# Patient Record
Sex: MALE | Race: WHITE | NOT HISPANIC OR LATINO | Employment: FULL TIME | URBAN - METROPOLITAN AREA
[De-identification: names, ages, dates, MRNs, and addresses within clinical notes are randomized per-mention and may not be internally consistent; named-entity substitution may affect disease eponyms.]

---

## 2017-03-20 ENCOUNTER — MYC MEDICAL ADVICE (OUTPATIENT)
Dept: FAMILY MEDICINE | Facility: CLINIC | Age: 57
End: 2017-03-20

## 2017-03-20 DIAGNOSIS — I10 HYPERTENSION GOAL BP (BLOOD PRESSURE) < 140/90: Primary | ICD-10-CM

## 2017-03-21 RX ORDER — LISINOPRIL 10 MG/1
10 TABLET ORAL DAILY
Qty: 30 TABLET | Refills: 0 | Status: SHIPPED | OUTPATIENT
Start: 2017-03-21 | End: 2017-04-17 | Stop reason: SINTOL

## 2017-03-21 NOTE — TELEPHONE ENCOUNTER
MP  Last OV 12/6/16 and Dyazide d/c'd because of lightheadedness  Did you want to start HTN med and have him f/u with you later, or first OV?  BP Readings from Last 3 Encounters:   12/06/16 140/90   08/05/15 122/82   07/06/15 128/82     Please advise.  Bhumika Frey, RN

## 2017-04-13 DIAGNOSIS — I10 HYPERTENSION GOAL BP (BLOOD PRESSURE) < 140/90: ICD-10-CM

## 2017-04-13 RX ORDER — LISINOPRIL 10 MG/1
TABLET ORAL
Start: 2017-04-13

## 2017-04-13 NOTE — TELEPHONE ENCOUNTER
Too soon.  Given 1 month 3/21. Further refill will be addressed at 4/17 OV    Note from 3/21 refill encounter:  Elliot Burkett MD    8:13 AM   Ok-I sent a new rx for one called Lisinopril to your [facility]. I need to see you in one month, just before you run out of it, and bring your BP cuff with you. A nuisance cough is one side effect you might get but hopefully not        Lisinopril    Last Written Prescription Date: 3/21/2017  Last Fill Quantity: 30, # refills: 0  Last Office Visit with G, P or [facility] prescribing provider: 12/6/2016  Next 5 appointments (look out 90 days)     Apr 17, 2017  9:30 AM CDT   Office Visit with Elliot Burkett MD   [facility]    [address] [address]   [phone]                   Potassium   Date Value Ref Range Status   12/06/2016 4.0 3.4 - 5.3 mmol/L Final     Creatinine   Date Value Ref Range Status   12/06/2016 1.06 0.66 - 1.25 mg/dL Final     BP Readings from Last 3 Encounters:   12/06/16 140/90   08/05/15 122/82   07/06/15 128/82

## 2017-04-17 ENCOUNTER — OFFICE VISIT (OUTPATIENT)
Dept: FAMILY MEDICINE | Facility: CLINIC | Age: 57
End: 2017-04-17
Payer: COMMERCIAL

## 2017-04-17 VITALS
HEART RATE: 74 BPM | RESPIRATION RATE: 14 BRPM | OXYGEN SATURATION: 98 % | BODY MASS INDEX: 32.23 KG/M2 | TEMPERATURE: 98.5 F | HEIGHT: 72 IN | SYSTOLIC BLOOD PRESSURE: 134 MMHG | DIASTOLIC BLOOD PRESSURE: 91 MMHG | WEIGHT: 238 LBS

## 2017-04-17 DIAGNOSIS — I10 HYPERTENSION GOAL BP (BLOOD PRESSURE) < 140/90: Primary | ICD-10-CM

## 2017-04-17 PROCEDURE — 99213 OFFICE O/P EST LOW 20 MIN: CPT | Performed by: FAMILY MEDICINE

## 2017-04-17 NOTE — PROGRESS NOTES
Chief Complaint   Patient presents with     Hypertension     follow up med change to Lisinopril-getting dry, tickly cough-Pt brought home cuff for comparison-exercises regularly     Blood Draw     -pt fasting if needed-concerned about blood sugar     Subjective:see my chart note, and I did start him on lisinopril because he was getting high readings and he did get the chronic tickle cough and he gets a little bit of dizziness when he stands up too quickly like he did with Dyazide but he brought in his blood pressure cuff today and it is clearly reading 10 or 15 points to eye in both the systolic and the diastolic making me question whether the original data was correct.    Objective: We went to the history and the numbers. Our blood pressure reading here was great but he did it with his machine around the same time and it was much higher and I did it again and got 118/70 and his machine just a minute earlier had gotten  140/90 something.    Assessment and plan: At this point I don't think he has hypertension but it's hard to know because his machine is inaccurate. He can go to the fire station or try to get an accurate machine, go off the lisinopril entirely which we wouldn't restart any way because of the cough, and if over the next few weeks his numbers are definitely high and they are accurate and I would start him on losartan 50 mg

## 2017-04-17 NOTE — NURSING NOTE
Chief Complaint   Patient presents with     Hypertension     follow up med change to Lisinopril-getting dry, tickly cough-Pt brought home cuff for comparison       Initial BP (!) 134/91  Pulse 74  Temp 98.5  F (36.9  C) (Oral)  Resp 14  Ht 6' (1.829 m)  Wt 238 lb (108 kg)  SpO2 98%  BMI 32.28 kg/m2 Estimated body mass index is 32.28 kg/(m^2) as calculated from the following:    Height as of this encounter: 6' (1.829 m).    Weight as of this encounter: 238 lb (108 kg).  BP completed using cuff size: large    Health Maintenance that is potentially due pending provider review:  Health Maintenance Due   Topic Date Due     DEPRESSION ACTION PLAN Q1 YR (NO INBASKET)  07/15/2016         ordered

## 2017-04-17 NOTE — MR AVS SNAPSHOT
After Visit Summary   4/17/2017    Elliot Norris    MRN: [id number]           Patient Information     Date Of Birth          1960        Visit Information        Provider Department      4/17/2017 9:30 AM Elliot Burkett MD [facility]        Today's Diagnoses     Hypertension goal BP (blood pressure) < 140/90    -  1       Follow-ups after your visit        Who to contact     If you have questions or need follow up information about today's clinic visit or your schedule please contact [facility] directly at [phone].  Normal or non-critical lab and imaging results will be communicated to you by Grabbithart, letter or phone within 4 business days after the clinic has received the results. If you do not hear from us within 7 days, please contact the clinic through RiseHealtht or phone. If you have a critical or abnormal lab result, we will notify you by phone as soon as possible.  Submit refill requests through Finicity or call your pharmacy and they will forward the refill request to us. Please allow 3 business days for your refill to be completed.          Additional Information About Your Visit        MyChart Information     Finicity gives you secure access to your electronic health record. If you see a primary care provider, you can also send messages to your care team and make appointments. If you have questions, please call your primary care clinic.  If you do not have a primary care provider, please call [phone] and they will assist you.        Care EveryWhere ID     This is your Care EveryWhere ID. This could be used by other organizations to access your [place] medical records  [id number]        Your Vitals Were     Pulse Temperature Respirations Height Pulse Oximetry BMI (Body Mass Index)    74 98.5  F (36.9  C) (Oral) 14 6' (1.829 m) 98% 32.28 kg/m2       Blood Pressure from Last 3 Encounters:   04/17/17 (!) 134/91   12/06/16 140/90   08/05/15 122/82     Weight from Last 3 Encounters:   04/17/17 238 lb (108 kg)   12/06/16 241 lb (109.3 kg)   08/05/15 244 lb (110.7 kg)              We Performed the Following     DEPRESSION ACTION PLAN (DAP)          Today's Medication Changes          These changes are accurate as of: 4/17/17 10:10 AM.  If you have any questions, ask your nurse or doctor.               Stop taking these medicines if you haven't already. Please contact your care team if you have questions.     lisinopril 10 MG tablet   Commonly known as:  PRINIVIL/ZESTRIL   Stopped by:  Elliot Burkett MD                    Primary Care Provider Office Phone # Fax #    Elliot Burkett [phone]       [facility] [address] [address]        Thank you!     Thank you for choosing [facility]  for your care. Our goal is always to provide you with excellent care. Hearing back from our patients is one way we can continue to improve our services. Please take a few minutes to complete the written survey that you may receive in the mail after your visit with us. Thank you!             Your Updated Medication List - Protect others around you: Learn how to safely use, store and throw away your medicines at www.disposemymeds.org.          This list is accurate as of: 4/17/17 10:10 AM.  Always use your most recent med list.                   Brand Name Dispense Instructions for use    BENADRYL 25 MG tablet   Generic drug:  diphenhydrAMINE      Take 25 mg by mouth At Bedtime       simvastatin 20 MG tablet    ZOCOR    90 tablet    Take 1 tablet (20 mg) by mouth At Bedtime

## 2017-06-10 ENCOUNTER — MYC MEDICAL ADVICE (OUTPATIENT)
Dept: FAMILY MEDICINE | Facility: CLINIC | Age: 57
End: 2017-06-10

## 2017-06-12 NOTE — TELEPHONE ENCOUNTER
KB is listed as PCP but has not seen him in recent years and MP not in office anymore.  Advised apt here to establish care and for next step on skin issue.  Mely William RN

## 2017-06-19 ENCOUNTER — OFFICE VISIT (OUTPATIENT)
Dept: FAMILY MEDICINE | Facility: CLINIC | Age: 57
End: 2017-06-19
Payer: COMMERCIAL

## 2017-06-19 VITALS
HEART RATE: 77 BPM | SYSTOLIC BLOOD PRESSURE: 145 MMHG | HEIGHT: 72 IN | BODY MASS INDEX: 32.37 KG/M2 | OXYGEN SATURATION: 98 % | DIASTOLIC BLOOD PRESSURE: 85 MMHG | WEIGHT: 239 LBS | TEMPERATURE: 97.4 F

## 2017-06-19 DIAGNOSIS — L98.9 SKIN LESION: Primary | ICD-10-CM

## 2017-06-19 DIAGNOSIS — Z85.828 HISTORY OF BASAL CELL CARCINOMA: ICD-10-CM

## 2017-06-19 PROCEDURE — 99214 OFFICE O/P EST MOD 30 MIN: CPT | Performed by: FAMILY MEDICINE

## 2017-06-19 NOTE — NURSING NOTE
Chief Complaint   Patient presents with     Skin Check     /85  Pulse 77  Temp 97.4  F (36.3  C) (Oral)  Ht 6' (1.829 m)  Wt 239 lb (108.4 kg)  SpO2 98%  BMI 32.41 kg/m2 Estimated body mass index is 32.41 kg/(m^2) as calculated from the following:    Height as of this encounter: 6' (1.829 m).    Weight as of this encounter: 239 lb (108.4 kg).  BP completed using cuff size: large       Health Maintenance due pending provider review:  PHQ9    PHQ9-Gave pt questionnare    Kimberly Gonsalves MA

## 2017-06-19 NOTE — MR AVS SNAPSHOT
After Visit Summary   6/19/2017    Elliot Norris    MRN: [id number]           Patient Information     Date Of Birth          1960        Visit Information        Provider Department      6/19/2017 1:30 PM Paulino Roberto MD [facility]        Today's Diagnoses     History of skin cancer    -  1    Skin lesion           Follow-ups after your visit        Additional Services     DERMATOLOGY REFERRAL       Your provider has referred you to: [facility]: [facility] [phone]   [email or web address]  [facility]: [place] [facility] [place] [phone]   [email or web address]    Please be aware that coverage of these services is subject to the terms and limitations of your health insurance plan.  Call member services at your health plan with any benefit or coverage questions.      Please bring the following with you to your appointment:    (1) Any X-Rays, CTs or MRIs which have been performed.  Contact the facility where they were done to arrange for  prior to your scheduled appointment.    (2) List of current medications  (3) This referral request   (4) Any documents/labs given to you for this referral                  Who to contact     If you have questions or need follow up information about today's clinic visit or your schedule please contact [facility] directly at [phone].  Normal or non-critical lab and imaging results will be communicated to you by MyChart, letter or phone within 4 business days after the clinic has received the results. If you do not hear from us within 7 days, please contact the clinic through MyChart or phone. If you have a critical or abnormal lab result, we will notify you by phone as soon as possible.  Submit refill requests through [employer] or call your pharmacy and they will forward the refill request to us. Please allow  3 business days for your refill to be completed.          Additional Information About Your Visit        MyChart Information     Sabirmedical gives you secure access to your electronic health record. If you see a primary care provider, you can also send messages to your care team and make appointments. If you have questions, please call your primary care clinic.  If you do not have a primary care provider, please call [phone] and they will assist you.        Care EveryWhere ID     This is your Care EveryWhere ID. This could be used by other organizations to access your [place] medical records  [id number]        Your Vitals Were     Pulse Temperature Height Pulse Oximetry BMI (Body Mass Index)       77 97.4  F (36.3  C) (Oral) 6' (1.829 m) 98% 32.41 kg/m2        Blood Pressure from Last 3 Encounters:   06/19/17 145/85   04/17/17 (!) 134/91   12/06/16 140/90    Weight from Last 3 Encounters:   06/19/17 239 lb (108.4 kg)   04/17/17 238 lb (108 kg)   12/06/16 241 lb (109.3 kg)              We Performed the Following     DERMATOLOGY REFERRAL        Primary Care Provider Office Phone # Fax #    Paulino Adrián Roberto [phone]       [place] [id number]        Thank you!     Thank you for choosing [facility]  for your care. Our goal is always to provide you with excellent care. Hearing back from our patients is one way we can continue to improve our services. Please take a few minutes to complete the written survey that you may receive in the mail after your visit with us. Thank you!             Your Updated Medication List - Protect others around you: Learn how to safely use, store and throw away your medicines at www.disposemymeds.org.          This list is accurate as of: 6/19/17  1:45 PM.  Always use your most recent med list.                   Brand Name Dispense Instructions for use    BENADRYL 25 MG tablet   Generic drug:   diphenhydrAMINE      Take 25 mg by mouth At Bedtime       simvastatin 20 MG tablet    ZOCOR    90 tablet    Take 1 tablet (20 mg) by mouth At Bedtime

## 2017-06-19 NOTE — PROGRESS NOTES
SUBJECTIVE:                                                    Elliot Norris is a 57 year old male who presents to clinic today for the following health issues:    Recheck spot on Lt side of forehead.  Patient is previously had this spot frozen in the clinic and also tried freezing it himself.  He has a diagnosis of actinic keratoses on the forehead but also a history of Mohs surgery for a basal cell carcinoma on his nose.  He somewhat worried that this lesion on his head may be something worse, it is certainly not healing      ROS: As per HPI.  Skin: Actinic keratoses but no other rashes  Lymph: no swollen nodes    Allergies, reviewed:   No Known Allergies   Med list prior to vist:    Current Outpatient Prescriptions on File Prior to Visit:  simvastatin (ZOCOR) 20 MG tablet Take 1 tablet (20 mg) by mouth At Bedtime   diphenhydrAMINE (BENADRYL) 25 MG tablet Take 25 mg by mouth At Bedtime      No current facility-administered medications on file prior to visit.   Medications reviewed and updated    Objective:  /85  Pulse 77  Temp 97.4  F (36.3  C) (Oral)  Ht 6' (1.829 m)  Wt 239 lb (108.4 kg)  SpO2 98%  BMI 32.41 kg/m2  General Appearance: Pleasant, alert, WN/WD in no acute respiratory distress.  Eye Exam: Normal external eye, conjunctiva, lids.  OroPharynx Exam: Dental hygiene adequate. Normal buccal mucosa. Normal pharynx.  Neck Exam: Supple, no masses or enlarged, tender nodes.  Cardiovascular Exam: No edema or vascular insufficiency.  Skin: 5 mm nonhealing ulcer on the left upper forehead  Neurologic Exam: Nonfocal, no tremor. Normal gait.  Psychiatric Exam: Alert - appropriate, normal affect    ASSESSMENT/PLAN:    ICD-10-CM    1. Skin lesion L98.9 DERMATOLOGY REFERRAL   2. History of basal cell carcinoma Z85.828       Skin lesion concerning for progression of actinic keratosis to squamous cell carcinoma, but patient also had a history of basal cell carcinoma  Previous attempts at treating  this lesion with cryotherapy were not successful patient needs dermatology follow-up and treatment  Referred today    Paulino Roberto MD MPH

## 2017-06-20 ASSESSMENT — PATIENT HEALTH QUESTIONNAIRE - PHQ9: SUM OF ALL RESPONSES TO PHQ QUESTIONS 1-9: 2

## 2017-07-17 ENCOUNTER — OFFICE VISIT (OUTPATIENT)
Dept: FAMILY MEDICINE | Facility: CLINIC | Age: 57
End: 2017-07-17
Payer: COMMERCIAL

## 2017-07-17 DIAGNOSIS — D48.5 NEOPLASM OF UNCERTAIN BEHAVIOR OF SKIN: Primary | ICD-10-CM

## 2017-07-17 DIAGNOSIS — Z85.828 HISTORY OF BASAL CELL CARCINOMA: ICD-10-CM

## 2017-07-17 PROCEDURE — 11310 SHAVE SKIN LESION 0.5 CM/<: CPT | Performed by: FAMILY MEDICINE

## 2017-07-17 PROCEDURE — 88305 TISSUE EXAM BY PATHOLOGIST: CPT | Mod: 90 | Performed by: FAMILY MEDICINE

## 2017-07-17 PROCEDURE — 99000 SPECIMEN HANDLING OFFICE-LAB: CPT | Performed by: FAMILY MEDICINE

## 2017-07-17 PROCEDURE — 99212 OFFICE O/P EST SF 10 MIN: CPT | Mod: 25 | Performed by: FAMILY MEDICINE

## 2017-07-17 NOTE — MR AVS SNAPSHOT
"              After Visit Summary   7/17/2017    Elliot Norris    MRN: [id number]           Patient Information     Date Of Birth          1960        Visit Information        Provider Department      7/17/2017 8:20 AM Parisa Madrigal MD [facility] Primary [facility] Skin        Today's Diagnoses     Neoplasm of uncertain behavior of skin    -  1      Care Instructions    WOUND CARE INSTRUCTIONS  1. After 24 hours, change dressing daily.  2. Wash hands before every dressing change.  3. Wash the wound area with a mild soap, then rinse  4. Gently pat dry with a sterile gauze or Q-tip.  5. Apply Vaseline or Aquaphor only over entire wound. Do NOT use Neosporin - as many people react to neomycin.  6. Finally, cover with a bandage or sterile non-stick gauze with micropore paper tape.  7. Repeat once daily until wound has healed.    Do not let the wound dry out.  The wound will heal faster and with better cosmetic results if routinely kept moist with step #5. It is a false belief that a wound heals better when it is exposed to air and allowed to dry out.      Soap, water and shampoo will not hurt this area.    Do not go swimming or take baths, but showering is encouraged.    Limit use of the area where the procedure was done for a few days to allow for optimal healing.    If you experience bleeding:  Repeat steps #2-5 and hold firm pressure on the area for 10 minutes without checking to see if the bleeding has stopped. \"Checking\" pulls off the protective wound clot and restarts the bleeding all over again. Re-apply pressure for 10 minutes if necessary to stop bleeding.  Use additional sterile gauze and tape to maintain pressure once bleeding has stopped.    Signs of Infection:  Infection can occur in any area where skin has been disrupted.  If you notice persistent redness, swelling, colored drainage, increasing pain, fever or other signs of infection, please call us at: [phone] and ask " to have me or my colleague paged. We will call you back to discuss.    Pathology Results:  You will be notified, generally via letter or MyChart, in approximately 10 days. If there is anything we need to discuss or further treatment needed, I will call you to discuss it.        PATIENT INFORMATION : WOUNDS  During the healing process you will notice a number of changes. All wounds develop a small halo of redness surrounding the wound.  This means healing is occurring. Severe itching with extensive redness usually indicates sensitivity to the ointment or bandage tape used to dress the wound.  You should call our office if this develops.      Swelling  and/or discoloration around your surgical site is common, particularly when performed around the eye.    All wounds normally drain.  The larger the wound the more drainage there will be.  After 7-10 days, you will notice the wound beginning to shrink and new skin will begin to grow.  The wound is healed when you can see skin has formed over the entire area.  A healed wound has a healthy, shiny look to the surface and is red to dark pink in color to normalize.  Wounds may take approximately 4-6 weeks to heal.  Larger wounds may take 6-8 weeks. After the wound is healed you may discontinue dressing changes.    You may experience a sensation of tightness as your wound heals. This is normal and will gradually subside.    Your healed wound may be sensitive to temperature changes. This sensitivity improves with time, but if you re having a lot of discomfort, try to avoid temperature extremes.    Patients frequently experience itching after their wound appears to have healed because of the continue healing under the skin.  Plain Vaseline will help relieve the itching.                Follow-ups after your visit        Who to contact     If you have questions or need follow up information about today's clinic visit or your schedule please contact [facility]  SK[facility] directly at [phone].  Normal or non-critical lab and imaging results will be communicated to you by MyChart, letter or phone within 4 business days after the clinic has received the results. If you do not hear from us within 7 days, please contact the clinic through [email or web address] or phone. If you have a critical or abnormal lab result, we will notify you by phone as soon as possible.  Submit refill requests through Sidestage or call your pharmacy and they will forward the refill request to us. Please allow 3 business days for your refill to be completed.          Additional Information About Your Visit        [email or web address] Information     Sidestage gives you secure access to your electronic health record. If you see a primary care provider, you can also send messages to your care team and make appointments. If you have questions, please call your primary care clinic.  If you do not have a primary care provider, please call [phone] and they will assist you.        Care EveryWhere ID     This is your Care EveryWhere ID. This could be used by other organizations to access your [place] medical records  [id number]         Blood Pressure from Last 3 Encounters:   06/19/17 145/85   04/17/17 (!) 134/91   12/06/16 140/90    Weight from Last 3 Encounters:   06/19/17 108.4 kg (239 lb)   04/17/17 108 kg (238 lb)   12/06/16 109.3 kg (241 lb)              We Performed the Following     CL SURG PATH [facility] DERM     SHAV SKIN LESION FACE/EARS <=0.5 CM        Primary Care Provider Office Phone # Fax #    Paulino Adrián Roberto [phone]       [facility] [address] [address]        Equal Access to Services     [facility] Medical CenterTHONY : Hadii aad ku hadasho Soomaali, waaxda luqadaha, qaybta kaalmada adeegyada, werner huynh. So [facility] [phone].    ATENCIÓN: Si habla español, tiene a pinon disposición servicios gratuitos de asistencia lingüística. Llame al  [phone].    We comply with applicable federal civil rights laws and [place] laws. We do not discriminate on the basis of race, color, national origin, age, disability sex, sexual orientation or gender identity.            Thank you!     Thank you for choosing [facility] - PR[facility] SK[facility]  for your care. Our goal is always to provide you with excellent care. Hearing back from our patients is one way we can continue to improve our services. Please take a few minutes to complete the written survey that you may receive in the mail after your visit with us. Thank you!             Your Updated Medication List - Protect others around you: Learn how to safely use, store and throw away your medicines at www.disposemymeds.org.          This list is accurate as of: 7/17/17  8:28 AM.  Always use your most recent med list.                   Brand Name Dispense Instructions for use Diagnosis    BENADRYL 25 MG tablet   Generic drug:  diphenhydrAMINE      Take 25 mg by mouth At Bedtime        simvastatin 20 MG tablet    ZOCOR    90 tablet    Take 1 tablet (20 mg) by mouth At Bedtime    Hyperlipidemia LDL goal <160

## 2017-07-17 NOTE — PATIENT INSTRUCTIONS
"WOUND CARE INSTRUCTIONS  1. After 24 hours, change dressing daily.  2. Wash hands before every dressing change.  3. Wash the wound area with a mild soap, then rinse  4. Gently pat dry with a sterile gauze or Q-tip.  5. Apply Vaseline or Aquaphor only over entire wound. Do NOT use Neosporin - as many people react to neomycin.  6. Finally, cover with a bandage or sterile non-stick gauze with micropore paper tape.  7. Repeat once daily until wound has healed.    Do not let the wound dry out.  The wound will heal faster and with better cosmetic results if routinely kept moist with step #5. It is a false belief that a wound heals better when it is exposed to air and allowed to dry out.      Soap, water and shampoo will not hurt this area.    Do not go swimming or take baths, but showering is encouraged.    Limit use of the area where the procedure was done for a few days to allow for optimal healing.    If you experience bleeding:  Repeat steps #2-5 and hold firm pressure on the area for 10 minutes without checking to see if the bleeding has stopped. \"Checking\" pulls off the protective wound clot and restarts the bleeding all over again. Re-apply pressure for 10 minutes if necessary to stop bleeding.  Use additional sterile gauze and tape to maintain pressure once bleeding has stopped.    Signs of Infection:  Infection can occur in any area where skin has been disrupted.  If you notice persistent redness, swelling, colored drainage, increasing pain, fever or other signs of infection, please call us at: [phone] and ask to have me or my colleague paged. We will call you back to discuss.    Pathology Results:  You will be notified, generally via letter or MyChart, in approximately 10 days. If there is anything we need to discuss or further treatment needed, I will call you to discuss it.        PATIENT INFORMATION : WOUNDS  During the healing process you will notice a number of changes. All wounds develop a small " halo of redness surrounding the wound.  This means healing is occurring. Severe itching with extensive redness usually indicates sensitivity to the ointment or bandage tape used to dress the wound.  You should call our office if this develops.      Swelling  and/or discoloration around your surgical site is common, particularly when performed around the eye.    All wounds normally drain.  The larger the wound the more drainage there will be.  After 7-10 days, you will notice the wound beginning to shrink and new skin will begin to grow.  The wound is healed when you can see skin has formed over the entire area.  A healed wound has a healthy, shiny look to the surface and is red to dark pink in color to normalize.  Wounds may take approximately 4-6 weeks to heal.  Larger wounds may take 6-8 weeks. After the wound is healed you may discontinue dressing changes.    You may experience a sensation of tightness as your wound heals. This is normal and will gradually subside.    Your healed wound may be sensitive to temperature changes. This sensitivity improves with time, but if you re having a lot of discomfort, try to avoid temperature extremes.    Patients frequently experience itching after their wound appears to have healed because of the continue healing under the skin.  Plain Vaseline will help relieve the itching.

## 2017-07-17 NOTE — PROGRESS NOTES
[facility] - PRIMARY CARE SKIN    CC : Lesion(s)  SUBJECTIVE:                                                    Elliot Norris is a 57 year old male who presents to clinic today because of a lesion on his scalp that he noticed years ago and has had cryotherapy in the past.     Bothersome lesions noticed by the patient or other skin concerns :  Issue    Lesion on nose   Onset : Months.  Enlarging : YES.  Bleeding : NO  Itchy or irritating : YES.  Pain or tenderness : NO.  Changing color : NO.    Personal history of skin cancer : YES - basal cell carcinoma.  Family history of skin cancer : Yes- Father     Sun Exposure History  Previous history of significant sun exposure:  Blistering sunburns : NO  Tanning beds : NO.  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    Refer to electronic medical record (EMR) for past medical history and medications.      ROS : 14 point review of systems was negative except the symptoms listed above in the HPI.        This document serves as a record of the services and decisions personally performed and made by Julia Madrigal MD. It was created on her behalf by Vicente Polanco, a trained medical scribe. The creation of this document is based on the scribe's personal observations and the provider's statements to the medical scribe.  Vicente Polanco July 17, 2017 6:42 AM      OBJECTIVE:                                                    GENERAL: healthy, alert and no distress  SKIN: Interiano Skin Type - I.  Scalp, Face, Neck, Arms and Hands were examined. The dermatoscope was used to help evaluate pigmented lesions.  Skin Pertinent Findings:  Scalp left frontal anterior scalp 5 mm escarp overline base of erythema and induration. ? BCC ? Actinic keratosis     Diagnostic Test Results:  No results found for this or any previous visit (from the past 24  "hour(s)).          ASSESSMENT:                                                      Encounter Diagnoses   Name Primary?     Neoplasm of uncertain behavior of skin Yes     History of basal cell carcinoma            PLAN:                                                    Patient Instructions   WOUND CARE INSTRUCTIONS  1. After 24 hours, change dressing daily.  2. Wash hands before every dressing change.  3. Wash the wound area with a mild soap, then rinse  4. Gently pat dry with a sterile gauze or Q-tip.  5. Apply Vaseline or Aquaphor only over entire wound. Do NOT use Neosporin - as many people react to neomycin.  6. Finally, cover with a bandage or sterile non-stick gauze with micropore paper tape.  7. Repeat once daily until wound has healed.    Do not let the wound dry out.  The wound will heal faster and with better cosmetic results if routinely kept moist with step #5. It is a false belief that a wound heals better when it is exposed to air and allowed to dry out.      Soap, water and shampoo will not hurt this area.    Do not go swimming or take baths, but showering is encouraged.    Limit use of the area where the procedure was done for a few days to allow for optimal healing.    If you experience bleeding:  Repeat steps #2-5 and hold firm pressure on the area for 10 minutes without checking to see if the bleeding has stopped. \"Checking\" pulls off the protective wound clot and restarts the bleeding all over again. Re-apply pressure for 10 minutes if necessary to stop bleeding.  Use additional sterile gauze and tape to maintain pressure once bleeding has stopped.    Signs of Infection:  Infection can occur in any area where skin has been disrupted.  If you notice persistent redness, swelling, colored drainage, increasing pain, fever or other signs of infection, please call us at: [phone] and ask to have me or my colleague paged. We will call you back to discuss.    Pathology Results:  You will be " notified, generally via letter or MyChart, in approximately 10 days. If there is anything we need to discuss or further treatment needed, I will call you to discuss it.        PATIENT INFORMATION : WOUNDS  During the healing process you will notice a number of changes. All wounds develop a small halo of redness surrounding the wound.  This means healing is occurring. Severe itching with extensive redness usually indicates sensitivity to the ointment or bandage tape used to dress the wound.  You should call our office if this develops.      Swelling  and/or discoloration around your surgical site is common, particularly when performed around the eye.    All wounds normally drain.  The larger the wound the more drainage there will be.  After 7-10 days, you will notice the wound beginning to shrink and new skin will begin to grow.  The wound is healed when you can see skin has formed over the entire area.  A healed wound has a healthy, shiny look to the surface and is red to dark pink in color to normalize.  Wounds may take approximately 4-6 weeks to heal.  Larger wounds may take 6-8 weeks. After the wound is healed you may discontinue dressing changes.    You may experience a sensation of tightness as your wound heals. This is normal and will gradually subside.    Your healed wound may be sensitive to temperature changes. This sensitivity improves with time, but if you re having a lot of discomfort, try to avoid temperature extremes.    Patients frequently experience itching after their wound appears to have healed because of the continue healing under the skin.  Plain Vaseline will help relieve the itching.                PROCEDURES:                                                    None.    Name : Shave Excision  Indication : Excision of tissue for pathology evaluation.  Location(s) : Left frontal scalp  Completed by : Julia Madrigal MD  Photo Taken : Yes  Anesthesia : Patient was anesthetized by infiltrating the  area surrounding the lesion with 1% lidocaine.   epinephrine [id number] : Yes.  Buffered with bicarbonate : Yes.  Note : Discussed the risk of pain, infection, scarring, hypo- or hyperpigmentation and recurrence or need for re-treatment. The benefits of treatment and alternative treatments were also discussed.    During this procedure, the universal protocol was utilized. The patient's identity was confirmed by no less than two patient identifiers, correct procedure was verified, correct site was verified and marked as applicable and a final pause was completed.    Sterile technique was used throughout the procedure. The skin was cleaned and prepped with surgical cleanser. Once adequate anesthesia was obtained, the lesion was removed with a deep scallop shave procedure. The specimen was sent to pathology.    Direct pressure, monopolar cautery, and monsels's was applied for hemostasis. No bleeding was present upon the completion of the procedure. The wound was coated with antibacterial ointment. A dry sterile dressing was applied. Patient tolerated the procedure well and left in satisfactory condition.    Primary provider and referring provider will be informed regarding the tissue report when it returns.    TT : 20 minutes.  CT : 15 minutes.      The information in this document, created by the medical scribe for me, accurately reflects the services I personally performed and the decisions made by me. I have reviewed and approved this document for accuracy prior to leaving the patient care area.  Julia Madrigal MD July 17, 2017 6:42 AM  [facility] - PRIMARY CARE SKIN

## 2017-07-26 ENCOUNTER — MYC MEDICAL ADVICE (OUTPATIENT)
Dept: FAMILY MEDICINE | Facility: CLINIC | Age: 57
End: 2017-07-26

## 2017-07-28 ENCOUNTER — TELEPHONE (OUTPATIENT)
Dept: FAMILY MEDICINE | Facility: CLINIC | Age: 57
End: 2017-07-28

## 2017-07-28 NOTE — TELEPHONE ENCOUNTER
Encounter : phonecall  Discussed lab results :       Pathology report : nodular basal cell carcinoma on the left frontal anterior scalp      Recommendations :  Mohs surgery

## 2017-08-01 ENCOUNTER — TELEPHONE (OUTPATIENT)
Dept: DERMATOLOGY | Facility: CLINIC | Age: 57
End: 2017-08-01

## 2017-08-01 NOTE — LETTER
[facility]  [address]  [address]  [phone]    8/1/2017     Elliot Norris  [address]  [facility] [address]      Dear Elliot:    You are scheduled for Mohs Surgery on: September 14 th @ 7 15 am.    Please check in at [facility], [address].     You don't need to arrive more than 5-10 minutes prior to your appointment time.     Be sure to eat a good breakfast and bathe and wash your hair prior to surgery. Please bring  with you.    If you are taking any anti-coagulants that are prescribed by your Doctor (such as Coumadin/Warfarin, Plavix, Aspirin, Ibuprofen), please continue taking them.     However, if you are taking anti-coagulants over the counter without a Doctor's order for a medical condition, please discontinue them 10 days prior to surgery.     Please wear loose comfortable clothing as it could possibly be 4-6 hours until your surgery is completed depending upon how many layers of tissue need to be removed.      Thank you,    TREY Huizar MD

## 2017-08-01 NOTE — TELEPHONE ENCOUNTER
Pt called back. Letter and info mailed to pt. Pt educated on procedure. Patient requested codes for MOHS procedure so he can ask him ins how much it will cost him.

## 2017-08-01 NOTE — TELEPHONE ENCOUNTER
----- Message from Elliot Huizar MD sent at 7/31/2017  7:16 AM CDT -----  Regarding: RE: mohs  Please call to schedule     (thanks luz maria)      ----- Message -----     From: Parisa Madrigal MD     Sent: 7/28/2017   4:12 PM       To: Elliot Huizar MD  Subject: mohs                                             Sharla Sue,        57 year old male with new basal cell carcinoma on the left frontal scalp. History of previous mohs on the nose. Your office can call him     Thank you,    luz maria

## 2017-08-01 NOTE — TELEPHONE ENCOUNTER
04005 first stage    If it goes more than one stage [address]8401      If it is not deep on scalp we can let it heal     If he wants stitches [address]

## 2017-09-14 ENCOUNTER — OFFICE VISIT (OUTPATIENT)
Dept: DERMATOLOGY | Facility: CLINIC | Age: 57
End: 2017-09-14
Payer: COMMERCIAL

## 2017-09-14 VITALS — HEART RATE: 77 BPM | DIASTOLIC BLOOD PRESSURE: 90 MMHG | SYSTOLIC BLOOD PRESSURE: 133 MMHG | HEIGHT: 72 IN

## 2017-09-14 DIAGNOSIS — L81.4 LENTIGO: ICD-10-CM

## 2017-09-14 DIAGNOSIS — C44.619 BASAL CELL CARCINOMA OF LEFT FOREARM: ICD-10-CM

## 2017-09-14 DIAGNOSIS — D23.9 DERMAL NEVUS: ICD-10-CM

## 2017-09-14 DIAGNOSIS — C44.319 BASAL CELL CARCINOMA, FOREHEAD: Primary | ICD-10-CM

## 2017-09-14 DIAGNOSIS — L82.1 SK (SEBORRHEIC KERATOSIS): ICD-10-CM

## 2017-09-14 PROCEDURE — 17312 MOHS ADDL STAGE: CPT | Performed by: DERMATOLOGY

## 2017-09-14 PROCEDURE — 11100: CPT | Mod: 59 | Performed by: DERMATOLOGY

## 2017-09-14 PROCEDURE — 17313 MOHS 1 STAGE T/A/L: CPT | Mod: 51 | Performed by: DERMATOLOGY

## 2017-09-14 PROCEDURE — 17311 MOHS 1 STAGE H/N/HF/G: CPT | Performed by: DERMATOLOGY

## 2017-09-14 PROCEDURE — 13132 CMPLX RPR F/C/C/M/N/AX/G/H/F: CPT | Mod: 51 | Performed by: DERMATOLOGY

## 2017-09-14 PROCEDURE — 99243 OFF/OP CNSLTJ NEW/EST LOW 30: CPT | Mod: 25 | Performed by: DERMATOLOGY

## 2017-09-14 PROCEDURE — 88331 PATH CONSLTJ SURG 1 BLK 1SPC: CPT | Mod: 59 | Performed by: DERMATOLOGY

## 2017-09-14 NOTE — NURSING NOTE
Initial /90  Pulse 77  Ht 1.829 m (6') Estimated body mass index is 32.41 kg/(m^2) as calculated from the following:    Height as of 6/19/17: 1.829 m (6').    Weight as of 6/19/17: 108.4 kg (239 lb). .

## 2017-09-14 NOTE — PATIENT INSTRUCTIONS
Sutured Wound Care     [facility]: [phone]    FOREHEAD          ? No strenuous activity for 48 hours. Resume moderate activity in 48 hours. No heavy exercising until you are seen for follow up in one week.     ? Take Tylenol as needed for discomfort.                         ? Do not drink alcoholic beverages for 48 hours.     ? Keep the pressure bandage in place for 24 hours. If the bandage becomes blood tinged or loose, reinforce it with gauze and tape.        (Refer to the reverse side of this page for management of bleeding).    ? Remove pressure bandage in 24 hours     ? Leave the flat bandage in place until your follow up appointment.    ? Keep the bandage dry. Wash around it carefully.    ? If the tape becomes soiled or starts to come off, reinforce it with additional paper tape.    ? Do not smoke for 3 weeks; smoking is detrimental to wound healing.    ? It is normal to have swelling and bruising around the surgical site. The bruising will fade in approximately 10-14 days. Elevate the area to reduce swelling.    ? Numbness, itchiness and sensitivity to temperature changes can occur after surgery and may take up to 18 months to normalize.      POSSIBLE COMPLICATIONS    BLEEDIN. Leave the bandage in place.  2. Use tightly rolled up gauze or a cloth to apply direct pressure over the bandage for 20   minutes.  3. Reapply pressure for an additional 20 minutes if necessary  4. Call the office or go to the nearest emergency room if pressure fails to stop the bleeding.  5. Use additional gauze and tape to maintain pressure once the bleeding has stopped.        PAIN:    1. Post operative pain should slowly get better, never worse.  2. A severe increase in pain may indicate a problem. Call the office if this occurs.    In case of emergency phone:Dr Huizar [phone]      Open Wound Care     for LEFT ARM        ? No strenuous activity for 48 hours    ? Take Tylenol as needed for discomfort.                                                 .         ? Do not drink alcoholic beverages for 48 hours.    ? Keep the pressure bandage in place for 24 hours. If the bandage becomes blood tinged or loose, reinforce it with gauze and tape.        (Refer to the reverse side of this page for management of bleeding).    ? Remove bandage in 24 hours and begin wound care as follows:     1. Clean area with tap water using a Q tip or gauze pad, (shower / bathe normally)  2. Dry wound with Q tip or gauze pad  3. Apply Aquaphor, Vaseline, Polysporin or Bacitracin Ointment with a Q tip    Do NOT use Neosporin Ointment *  4. Cover the wound with a band-aid or nonstick gauze pad and paper tape.  5. Repeat wound care once a day until wound is completely healed.    It is an old wives tale that a wound heals better when it is exposed to air and allowed to dry out. The wound will heal faster with a better cosmetic result if it is kept moist with ointment and covered with a bandage.  Do not let the wound dry out.      Supplies Needed:                Qtips or gauze pads                Polysporin or Bacitracin Ointment                Bandaids or nonstick gauze pads and paper tape    Wound care kits and brown paper tape are available for purchase at   the pharmacy.    BLEEDIN. Use tightly rolled up gauze or cloth to apply direct pressure over the bandage for 20   minutes.  2. Reapply pressure for an additional 20 minutes if necessary  3. Call the office or go to the nearest emergency room if pressure fails to stop the bleeding.  4. Use additional gauze and tape to maintain pressure once the bleeding has stopped.  5. Begin wound care 24 hours after surgery as directed.                  WOUND HEALING    1. One week after surgery a pink / red halo will form around the outside of the wound.   This is new skin.  2. The center of the wound will appear yellowish white and produce some drainage.  3. The pink halo will slowly migrate in  toward the center of the wound until the wound is covered with new shiny pink skin.  4. There will be no more drainage when the wound is completely healed.  5. It will take six months to one year for the redness to fade.  6. The scar may be itchy, tight and sensitive to extreme temperatures for a year after the surgery.  7. Massaging the area several times a day for several minutes after the wound is completely healed will help the scar soften and normalize faster. Begin massage only after healing is complete.      In case of emergency call: Dr Huizar: [phone]     [facility]: [phone]

## 2017-09-14 NOTE — NURSING NOTE
Surgical Office Location:  [facility]  [address] [place] [id number]

## 2017-09-14 NOTE — PROGRESS NOTES
Elliot Norris , a 57 year old year old male patient, I was asked to see by Dr. Madrigal for basal cell carcinoma on forehead.  Today he notes a hx of basal cell carcinoma o nnose.  He notes a spot on left forearm and chin.  Patient states this has been present for a while.  Location chin and forearm..  Patient reports the following symptoms:  none .  Patient reports the following previous treatments none.  Patient reports the following modifying factors none.  Associated symptoms: none.  He also notes brown spots on chest.  .  Patient has no other skin complaints today.  Remainder of the HPI, Meds, PMH, Allergies, FH, and SH was reviewed in chart.    Pertinent Hx:   Basal cell carcinoma   No past medical history on file.    Past Surgical History:   Procedure Laterality Date     MOHS MICROGRAPHIC PROCEDURE          Family History   Problem Relation Age of Onset     DIABETES Mother      Arthritis Mother      Gynecology Mother      DIABETES Father      Hypertension Father      Alcohol/Drug Father      Circulatory Father      Depression Father      HEART DISEASE Father      Lipids Father      CEREBROVASCULAR DISEASE Father       age 78     DIABETES Maternal Grandmother      CEREBROVASCULAR DISEASE Maternal Grandfather      Prostate Cancer Maternal Grandfather      Prostate Cancer Paternal Grandfather      Depression Paternal Grandmother      Depression Sister        Social History     Social History     Marital status: Life Partner     Spouse name: N/A     Number of children: N/A     Years of education: N/A     Occupational History     Not on file.     Social History Main Topics     Smoking status: Never Smoker     Smokeless tobacco: Never Used     Alcohol use Yes      Comment: 1 drink per week     Drug use: No     Sexual activity: Yes     Partners: Male     Other Topics Concern     Parent/Sibling W/ Cabg, Mi Or Angioplasty Before 65f 55m? No     Social History Narrative       Outpatient Encounter  Prescriptions as of 9/14/2017   Medication Sig Dispense Refill     simvastatin (ZOCOR) 20 MG tablet Take 1 tablet (20 mg) by mouth At Bedtime 90 tablet 3     diphenhydrAMINE (BENADRYL) 25 MG tablet Take 25 mg by mouth At Bedtime        No facility-administered encounter medications on file as of 9/14/2017.              Review Of Systems  Skin: As above  Eyes: negative  Ears/Nose/Throat: negative  Respiratory: No shortness of breath, dyspnea on exertion, cough, or hemoptysis  Cardiovascular: negative  Gastrointestinal: negative  Genitourinary: negative  Musculoskeletal: negative  Neurologic: negative  Psychiatric: negative  Hematologic/Lymphatic/Immunologic: negative  Endocrine: negative      O:   NAD, WDWN, Alert & Oriented, Mood & Affect wnl, Vitals stable   Here today alone   /90  Pulse 77  Ht 1.829 m (6')   General appearance wilver ii   Vitals stable   Alert, oriented and in no acute distress      Following lymph nodes palpated: Occipital, Cervical, Supraclavicular no lad   Stuck on papules and brown macules on trunk and ext    L forearm 1.3cm red scaly papule    Chin flesh colored papule   L forehead 9mm pink pearly papule         The remainder of expanded problem focused exam was unremarkable; the following areas were examined:  scalp/hair, conjunctiva/lids, face, neck, lips, chest, digits/nails, RUE, LUE.      Eyes: Conjunctivae/lids:Normal     ENT: Lips, buccal mucosa, tongue: normal    MSK:Normal    Cardiovascular: peripheral edema none    Pulm: Breathing Normal    Lymph Nodes: No Head and Neck Lymphadenopathy     Neuro/Psych: Orientation:Normal; Mood/Affect:Normal      MICRO:   L forearm:Orthokeratosis of epidermis with a proliferation of nests of basaloid cells, with peripheral palisading and a haphazard arrangement in the center extending into the dermis, forming nodules.  The tumor cells have hyperchromatic nuclei. Poor cytoplasm and intercellular bridging.    A/P:  1. L forearm r./o basal cell  carcinoma   TANGENTIAL BIOPSY IN HOUSE:  After consent, anesthesia with LEC and prep, tangential excision performed and dx above confirmed with frozen section histology.  No complications and routine wound care.  Patient told result basal cell carcinoma .    2. Seborrheic keratosis, lentigo, dermal nevus  3. Hx of basal cell carcinoma     BENIGN LESIONS DISCUSSED WITH PATIENT:  I discussed the specifics of tumor, prognosis, and genetics of benign lesions.  I explained that treatment of these lesions would be purely cosmetic and not medically neccessary.  I discussed with patient different removal options including excision, cautery and /or laser.      Nature and genetics of benign skin lesions dicussed with patient.  Signs and Symptoms of skin cancer discussed with patient.  Patient encouraged to perform monthly skin exams.  UV precautions reviewed with patient.  Skin care regimen reviewed with patient: Eliminate harsh soaps, i.e. Dial, zest, irsih spring; Mild soaps such as Cetaphil or Dove sensitive skin, avoid hot or cold showers, aggressive use of emollients including vanicream, cetaphil or cerave discussed with patient.    Risks of non-melanoma skin cancer discussed with patient   Return to clinic 6 months      PROCEDURE NOTE  L forehead basal cell carcinoma   MOHS:   Location    After PGACAC discussed with patient, decision for Mohs surgery was made. Indication for Mohs was Location. Patient confirmed the site with Dr. Huizar.  After anesthesia with LEC, the tumor was excised using standard Mohs technique in 2 stages(s).  CLEAR MARGINS OBTAINED and Final defect size was 1.6 cm.       REPAIR COMPLEX: Because of the tightness of the surrounding skin and Because of the size and full thickness nature of the defect, a complex closure was planned. After LEC anesthesia and prep, Burow's triangles were excised in the relaxed skin tension lines. The wound edges were widely undermined by dissection in the subcutaneous  plane until adequate tissue mobility was obtained. Hemostasis was obtained. The wound edges were closed in a layered fashion using Vicryl and Fast Absorbing Plain Gut sutures. Postoperative length was 5.3 cm.   EBL minimal; complications none; wound care routine.  The patient was discharged in good condition and will return in one week for wound evaluation.      L forearm basal cell carcinoma   MOHS:   Size    After PGACAC discussed with patient, decision for Mohs surgery was made. Indication for Mohs was Size. Patient confirmed the site with Dr. Huizar.  After anesthesia with LEC, the tumor was excised using standard Mohs technique in 1 stages(s).  CLEAR MARGINS OBTAINED and Final defect size was 1.7 cm.       REPAIR SECOND INTENT: We discussed the options for wound management in full with the patient including risks/benefits/possible outcomes. Decision made to allow the wound to heal by second intention. EBL minimal; complications none; wound care routine.  The patient was discharged in good condition and will return in one month or prn for wound evaluation.

## 2017-09-21 ENCOUNTER — ALLIED HEALTH/NURSE VISIT (OUTPATIENT)
Dept: DERMATOLOGY | Facility: CLINIC | Age: 57
End: 2017-09-21
Payer: COMMERCIAL

## 2017-09-21 DIAGNOSIS — Z48.01 ENCOUNTER FOR CHANGE OR REMOVAL OF SURGICAL WOUND DRESSING: Primary | ICD-10-CM

## 2017-09-21 PROCEDURE — 99207 ZZC NO CHARGE NURSE ONLY: CPT

## 2017-09-21 NOTE — PATIENT INSTRUCTIONS
WOUND CARE INSTRUCTIONS  for  ONE WEEK AFTER SURGERY          1) Leave flat bandage on your skin for one week after today s bandage change.  2) In one week when you remove the bandage, you may resume your regular skin care routine, including washing with mild soap and water, applying moisturizer, make-up and sunscreen.    3) If there are any open or bleeding areas at the incision/graft site you should begin to cover the area with a bandage daily as follows:    1) Clean and dry the area with plain tap water using a Q-tip or sterile gauze pad.  2) Apply Polysporin or Bacitracin ointment to the open area.  3) Cover the wound with a band-aid or a sterile non-stick gauze pad and micropore paper tape.             *Once the bandages are removed, the scar will be red and firm (especially in the lip/chin area). This is normal and will fade in time. It might take 6-12 months for this to happen.     *Massaging the area will help the scar soften and fade quicker. Begin to massage the area one month after the bandages have been removed. To massage apply pressure directly and firmly over the scar with the fingertips and move in a circular motion. Massage the area for a few minutes several times a day. Continue to massage the site for several months.    *Approximately 6-8 weeks after surgery it is not uncommon to see the formation of  tender pimple-like  bump along the scar. This is normal. As the scar continues to mature and the stitches underneath the skin begin to dissolve, this might occur. Do not pick or squeeze, this will resolve on it s own. Should one break open producing a small amount of drainage, apply Polysporin or Bacitracin ointment a few times a day until the wound is completely healed.    *Numbness in the surgical area is expected. It might take 12-18 months for the feeling to return to normal. During this time sensations of itchiness, tingling and occasional sharp pains might be noted. These feelings are normal  and will subside once the nerves have completely healed.         IN CASE OF EMERGENCY: Dr Huizar [phone]       If you were seen in [place] call: [phone]

## 2017-09-21 NOTE — MR AVS SNAPSHOT
After Visit Summary   9/21/2017    Elliot Norris    MRN: [id number]           Patient Information     Date Of Birth          1960        Visit Information        Provider Department      9/21/2017 2:20 PM [facility]X DERM NURSE [facility]        Care Instructions    WOUND CARE INSTRUCTIONS  for  ONE WEEK AFTER SURGERY          1) Leave flat bandage on your skin for one week after today s bandage change.  2) In one week when you remove the bandage, you may resume your regular skin care routine, including washing with mild soap and water, applying moisturizer, make-up and sunscreen.    3) If there are any open or bleeding areas at the incision/graft site you should begin to cover the area with a bandage daily as follows:    1) Clean and dry the area with plain tap water using a Q-tip or sterile gauze pad.  2) Apply Polysporin or Bacitracin ointment to the open area.  3) Cover the wound with a band-aid or a sterile non-stick gauze pad and micropore paper tape.             *Once the bandages are removed, the scar will be red and firm (especially in the lip/chin area). This is normal and will fade in time. It might take 6-12 months for this to happen.     *Massaging the area will help the scar soften and fade quicker. Begin to massage the area one month after the bandages have been removed. To massage apply pressure directly and firmly over the scar with the fingertips and move in a circular motion. Massage the area for a few minutes several times a day. Continue to massage the site for several months.    *Approximately 6-8 weeks after surgery it is not uncommon to see the formation of  tender pimple-like  bump along the scar. This is normal. As the scar continues to mature and the stitches underneath the skin begin to dissolve, this might occur. Do not pick or squeeze, this will resolve on it s own. Should one break open producing a small amount of drainage, apply Polysporin  or Bacitracin ointment a few times a day until the wound is completely healed.    *Numbness in the surgical area is expected. It might take 12-18 months for the feeling to return to normal. During this time sensations of itchiness, tingling and occasional sharp pains might be noted. These feelings are normal and will subside once the nerves have completely healed.         IN CASE OF EMERGENCY: Dr Huizar [phone]       If you were seen in [place] call: [phone]              Follow-ups after your visit        Your next 10 appointments already scheduled     Sep 21, 2017  2:20 PM CDT   Nurse Only with [facility] DERM NURSE   [facility]    [address] [address] [phone]              Who to contact     If you have questions or need follow up information about today's clinic visit or your schedule please contact [facility] directly at [phone].  Normal or non-critical lab and imaging results will be communicated to you by Caviumhart, letter or phone within 4 business days after the clinic has received the results. If you do not hear from us within 7 days, please contact the clinic through Qzzrt or phone. If you have a critical or abnormal lab result, we will notify you by phone as soon as possible.  Submit refill requests through [employer] or call your pharmacy and they will forward the refill request to us. Please allow 3 business days for your refill to be completed.          Additional Information About Your Visit        Capture [employer] Information     [employer] gives you secure access to your electronic health record. If you see a primary care provider, you can also send messages to your care team and make appointments. If you have questions, please call your primary care clinic.  If you do not have a primary care provider, please call [phone] and they will assist you.        Care EveryWhere ID      This is your Care EveryWhere ID. This could be used by other organizations to access your [place] medical records  [id number]         Blood Pressure from Last 3 Encounters:   09/14/17 133/90   06/19/17 145/85   04/17/17 (!) 134/91    Weight from Last 3 Encounters:   06/19/17 108.4 kg (239 lb)   04/17/17 108 kg (238 lb)   12/06/16 109.3 kg (241 lb)              Today, you had the following     No orders found for display       Primary Care Provider Office Phone # Fax #    Paulino Adrián Roberto [phone] [address] [facility] [address]        Equal Access to Services     SONAM JACKSON : Hadii shai monzon hadasho Soomaali, waaxda luqadaha, qaybta kaalmada adeegyada, werner jeronimo . So [facility] [phone].    ATENCIÓN: Si habla español, tiene a pinon disposición servicios gratuitos de asistencia lingüística. [facility] [phone].    We comply with applicable federal civil rights laws and [place] laws. We do not discriminate on the basis of race, color, national origin, age, disability sex, sexual orientation or gender identity.            Thank you!     Thank you for choosing [facility]  for your care. Our goal is always to provide you with excellent care. Hearing back from our patients is one way we can continue to improve our services. Please take a few minutes to complete the written survey that you may receive in the mail after your visit with us. Thank you!             Your Updated Medication List - Protect others around you: Learn how to safely use, store and throw away your medicines at www.disposemymeds.org.          This list is accurate as of: 9/21/17  2:04 PM.  Always use your most recent med list.                   Brand Name Dispense Instructions for use Diagnosis    BENADRYL 25 MG tablet   Generic drug:  diphenhydrAMINE      Take 25 mg by mouth At Bedtime        simvastatin 20 MG tablet    ZOCOR    90 tablet    Take 1  tablet (20 mg) by mouth At Bedtime    Hyperlipidemia LDL goal <160

## 2017-11-13 ENCOUNTER — MYC MEDICAL ADVICE (OUTPATIENT)
Dept: FAMILY MEDICINE | Facility: CLINIC | Age: 57
End: 2017-11-13

## 2017-11-27 DIAGNOSIS — E78.5 HYPERLIPIDEMIA LDL GOAL <160: ICD-10-CM

## 2017-11-28 RX ORDER — SIMVASTATIN 20 MG
TABLET ORAL
Qty: 30 TABLET | Refills: 0 | Status: SHIPPED | OUTPATIENT
Start: 2017-11-28 | End: 2017-11-28

## 2017-11-28 RX ORDER — SIMVASTATIN 20 MG
TABLET ORAL
Qty: 30 TABLET | Refills: 0 | Status: SHIPPED | OUTPATIENT
Start: 2017-11-28 | End: 2018-02-08

## 2017-11-28 NOTE — TELEPHONE ENCOUNTER
Received E Prescribing Error for Simvastatin.  Re E Prescribed per protocol and confirmed successfully sent.  Narda ZHONG RN

## 2017-11-28 NOTE — TELEPHONE ENCOUNTER
Medication is being filled for 1 time refill only due to:  Patient needs to be seen because due for fasting physical.   Narda ZHONG RN    Requested Prescriptions   Pending Prescriptions Disp Refills     simvastatin (ZOCOR) 20 MG tablet [Pharmacy Med Name: SIMVASTATIN 20MG TABLETS] 90 tablet 0     Sig: TAKE 1 TABLET(20 MG) BY MOUTH AT BEDTIME    Statins Protocol Passed    11/27/2017  3:37 PM       Passed - LDL on file in past 12 months    Recent Labs   Lab Test  12/06/16   1002   LDL  120*            Passed - No abnormal creatine kinase in past 12 months    No lab results found.         Passed - Recent or future visit with authorizing provider    Patient had office visit in the last year or has a visit in the next 30 days with authorizing provider.  See chart review.              Passed - Patient is age 18 or older

## 2018-02-06 ENCOUNTER — TELEPHONE (OUTPATIENT)
Dept: LAB | Facility: CLINIC | Age: 58
End: 2018-02-06

## 2018-02-06 DIAGNOSIS — I10 HYPERTENSION GOAL BP (BLOOD PRESSURE) < 140/90: ICD-10-CM

## 2018-02-06 DIAGNOSIS — E78.5 HYPERLIPIDEMIA LDL GOAL <160: Primary | ICD-10-CM

## 2018-02-06 DIAGNOSIS — R73.03 PREDIABETES: ICD-10-CM

## 2018-02-06 NOTE — TELEPHONE ENCOUNTER
Patient is coming to lab on 2/7/18. He did not indicate why.   Please review patient chart and future order if needed.    Thanks, Lab

## 2018-02-07 DIAGNOSIS — I10 HYPERTENSION GOAL BP (BLOOD PRESSURE) < 140/90: ICD-10-CM

## 2018-02-07 DIAGNOSIS — R73.03 PREDIABETES: ICD-10-CM

## 2018-02-07 DIAGNOSIS — E78.5 HYPERLIPIDEMIA LDL GOAL <160: ICD-10-CM

## 2018-02-07 LAB
ANION GAP SERPL CALCULATED.3IONS-SCNC: 7 MMOL/L (ref 3–14)
BUN SERPL-MCNC: 12 MG/DL (ref 7–30)
CALCIUM SERPL-MCNC: 8.8 MG/DL (ref 8.5–10.1)
CHLORIDE SERPL-SCNC: 107 MMOL/L (ref 94–109)
CHOLEST SERPL-MCNC: 301 MG/DL
CO2 SERPL-SCNC: 26 MMOL/L (ref 20–32)
CREAT SERPL-MCNC: 1.03 MG/DL (ref 0.66–1.25)
GFR SERPL CREATININE-BSD FRML MDRD: 74 ML/MIN/1.7M2
GLUCOSE SERPL-MCNC: 99 MG/DL (ref 70–99)
HBA1C MFR BLD: 5.7 % (ref 4.3–6)
HDLC SERPL-MCNC: 37 MG/DL
LDLC SERPL CALC-MCNC: 191 MG/DL
NONHDLC SERPL-MCNC: 264 MG/DL
POTASSIUM SERPL-SCNC: 3.9 MMOL/L (ref 3.4–5.3)
SODIUM SERPL-SCNC: 140 MMOL/L (ref 133–144)
TRIGL SERPL-MCNC: 367 MG/DL

## 2018-02-07 PROCEDURE — 80061 LIPID PANEL: CPT | Performed by: FAMILY MEDICINE

## 2018-02-07 PROCEDURE — 36415 COLL VENOUS BLD VENIPUNCTURE: CPT | Performed by: FAMILY MEDICINE

## 2018-02-07 PROCEDURE — 80048 BASIC METABOLIC PNL TOTAL CA: CPT | Performed by: FAMILY MEDICINE

## 2018-02-07 PROCEDURE — 83036 HEMOGLOBIN GLYCOSYLATED A1C: CPT | Performed by: FAMILY MEDICINE

## 2018-02-08 ENCOUNTER — OFFICE VISIT (OUTPATIENT)
Dept: FAMILY MEDICINE | Facility: CLINIC | Age: 58
End: 2018-02-08
Payer: COMMERCIAL

## 2018-02-08 VITALS
HEART RATE: 82 BPM | TEMPERATURE: 98.4 F | WEIGHT: 251.8 LBS | RESPIRATION RATE: 16 BRPM | DIASTOLIC BLOOD PRESSURE: 86 MMHG | OXYGEN SATURATION: 99 % | SYSTOLIC BLOOD PRESSURE: 142 MMHG | BODY MASS INDEX: 34.15 KG/M2

## 2018-02-08 DIAGNOSIS — E78.5 HYPERLIPIDEMIA LDL GOAL <160: Primary | ICD-10-CM

## 2018-02-08 DIAGNOSIS — L90.5 SCAR CONDITION AND FIBROSIS OF SKIN: ICD-10-CM

## 2018-02-08 PROCEDURE — 99214 OFFICE O/P EST MOD 30 MIN: CPT | Performed by: FAMILY MEDICINE

## 2018-02-08 RX ORDER — SIMVASTATIN 40 MG
40 TABLET ORAL AT BEDTIME
Qty: 90 TABLET | Refills: 3 | Status: SHIPPED | OUTPATIENT
Start: 2018-02-08 | End: 2019-08-20

## 2018-02-08 NOTE — NURSING NOTE
Chief Complaint   Patient presents with     Recheck Medication     Initial /86  Pulse 82  Temp 98.4  F (36.9  C) (Oral)  Resp 16  Wt 251 lb 12.8 oz (114.2 kg)  SpO2 99%  BMI 34.15 kg/m2 Estimated body mass index is 34.15 kg/(m^2) as calculated from the following:    Height as of 9/14/17: 6' (1.829 m).    Weight as of this encounter: 251 lb 12.8 oz (114.2 kg).  BP completed using cuff size: large. R arm       Health Maintenance that is potentially due pending provider review:   NONE       Zoë Hernandez CMA

## 2018-02-08 NOTE — PROGRESS NOTES
"  SUBJECTIVE:   Elliot Norris is a 57 year old male who presents to clinic today for the following health issues:    Hyperlipidemia Follow-Up      Rate your low fat/cholesterol diet?: not monitoring fat    Taking statin?  Yes, but ran out 3 weeks ago     Other lipid medications/supplements?:  none      Amount of exercise or physical activity: 2-3 days/week for an average of 30-45 minutes    Problems taking medications regularly: No    Medication side effects: none    Diet: less meat    The 10-year ASCVD risk score (Himrod OLGA Jr, et al., 2013) is: 15.3%    Values used to calculate the score:      Age: 57 years      Sex: Male      Is Non- : No      Diabetic: No      Tobacco smoker: No      Systolic Blood Pressure: 142 mmHg      Is BP treated: No      HDL Cholesterol: 37 mg/dL      Total Cholesterol: 301 mg/dL    This most recent value was while he was off of his Zocor  Looking back a is never really been at goal On 20 mg so we discussed increasing it to 40 mg    This happened after he recently had a Mohs surgery for a lesion on the top of his scalp that scar on the left side of the head has a lump.  Ended up being quite a bit larger than previously thought it would be, thus the scar ended up being quite large  thickening and tugging of the scar, doesn't feel right would like to see someone to see if it can be \"fixed\"        ROS: As per HPI.  Constitutional: no recent illness, no fevers/sweats/chills  No chest pain shortness of breath or cardiac symptoms    Allergies, reviewed:   No Known Allergies   Med list prior to vist:    Current Outpatient Prescriptions on File Prior to Visit:  [DISCONTINUED] simvastatin (ZOCOR) 20 MG tablet TAKE 1 TABLET(20 MG) BY MOUTH AT BEDTIME   diphenhydrAMINE (BENADRYL) 25 MG tablet Take 25 mg by mouth At Bedtime      No current facility-administered medications on file prior to visit.   Medications reviewed and updated    Objective:  /86  Pulse 82  " Temp 98.4  F (36.9  C) (Oral)  Resp 16  Wt 251 lb 12.8 oz (114.2 kg)  SpO2 99%  BMI 34.15 kg/m2  General Appearance: Pleasant, alert, WN/WD in no acute respiratory distress.  Neck Exam: Supple, no masses or enlarged, tender nodes.  Cardiovascular Exam: No edema or vascular insufficiency.  Skin: There is a somewhat jagged linear scar on his left frontal scalp, there is some thickening at either end of the scar  Neurologic Exam: Nonfocal, no tremor. Normal gait.  Psychiatric Exam: Alert - appropriate, normal affect    ASSESSMENT/PLAN:    ICD-10-CM    1. Hyperlipidemia LDL goal <160 E78.5 simvastatin (ZOCOR) 40 MG tablet   2. Scar condition and fibrosis of skin L90.5 PLASTIC SURGERY REFERRAL     Recommend increasing his simvastatin to 40 mg for maximum heart disease prevention or at least closer to goal, based on risk of greater than 15%    This abnormal scar I am not sure what they would be able to do but he would like to have a consultation with plastics and that is fine     Follow up: Return as needed if symptoms fail to improve      Paulino Roberto MD MPH

## 2018-02-08 NOTE — MR AVS SNAPSHOT
After Visit Summary   2/8/2018    Elliot Norris    MRN: [id number]           Patient Information     Date Of Birth          1960        Visit Information        Provider Department      2/8/2018 10:00 AM Paulino Roberto MD [facility]        Today's Diagnoses     Hyperlipidemia LDL goal <160    -  1    Scar condition and fibrosis of skin           Follow-ups after your visit        Additional Services     PLASTIC SURGERY REFERRAL       Your provider has referred you to: [facility]: Plastic and Reconstructive Surgery [facility] [phone]   [email or web address]    Please be aware that coverage of these services is subject to the terms and limitations of your health insurance plan.  Call member services at your health plan with any benefit or coverage questions.      Please bring the following with you to your appointment:    (1) Any X-Rays, CTs or MRIs which have been performed.  Contact the facility where they were done to arrange for  prior to your scheduled appointment.    (2) List of current medications  (3) This referral request   (4) Any documents/labs given to you for this referral                  Who to contact     If you have questions or need follow up information about today's clinic visit or your schedule please contact [facility] directly at [phone].  Normal or non-critical lab and imaging results will be communicated to you by MyChart, letter or phone within 4 business days after the clinic has received the results. If you do not hear from us within 7 days, please contact the clinic through MyChart or phone. If you have a critical or abnormal lab result, we will notify you by phone as soon as possible.  Submit refill requests through [employer] or call your pharmacy and they will forward the refill request to us. Please allow 3 business days for your refill to be  completed.          Additional Information About Your Visit        Social [employer] Information     [email or web address] gives you secure access to your electronic health record. If you see a primary care provider, you can also send messages to your care team and make appointments. If you have questions, please call your primary care clinic.  If you do not have a primary care provider, please call [phone] and they will assist you.        Care EveryWhere ID     This is your Care EveryWhere ID. This could be used by other organizations to access your [facility] medical records  [id number]        Your Vitals Were     Pulse Temperature Respirations Pulse Oximetry BMI (Body Mass Index)       82 98.4  F (36.9  C) (Oral) 16 99% 34.15 kg/m2        Blood Pressure from Last 3 Encounters:   02/08/18 142/86   09/14/17 133/90   06/19/17 145/85    Weight from Last 3 Encounters:   02/08/18 251 lb 12.8 oz (114.2 kg)   06/19/17 239 lb (108.4 kg)   04/17/17 238 lb (108 kg)              We Performed the Following     PLASTIC SURGERY REFERRAL          Today's Medication Changes          These changes are accurate as of 2/8/18 10:27 AM.  If you have any questions, ask your nurse or doctor.               These medicines have changed or have updated prescriptions.        Dose/Directions    simvastatin 40 MG tablet   Commonly known as:  ZOCOR   This may have changed:    - medication strength  - how much to take  - how to take this  - when to take this  - additional instructions   Used for:  Hyperlipidemia LDL goal <160   Changed by:  Paulino Roberto MD        Dose:  40 mg   Take 1 tablet (40 mg) by mouth At Bedtime   Quantity:  90 tablet   Refills:  3            Where to get your medicines      These medications were sent to [facility] [address] [address] AT [facility] [address] [address]     Phone:  [phone]     simvastatin 40 MG tablet                Primary Care  Provider Office Phone # Fax #    Paulino Adrián Roberto [phone] [address] [facility] [address]        Equal Access to Services     BARBARALIS LUTZTHONY : Hadii aad ku hadsudeepo Soomaali, waaxda luqadaha, qaybta kaalmada adeegyada, werner reyes laanupamcata lino. So [facility] [phone].    ATENCIÓN: Si habla español, tiene a pinon disposición servicios gratuitos de asistencia lingüística. Llame al [phone].    We comply with applicable federal civil rights laws and [place] laws. We do not discriminate on the basis of race, color, national origin, age, disability, sex, sexual orientation, or gender identity.            Thank you!     Thank you for choosing [facility]  for your care. Our goal is always to provide you with excellent care. Hearing back from our patients is one way we can continue to improve our services. Please take a few minutes to complete the written survey that you may receive in the mail after your visit with us. Thank you!             Your Updated Medication List - Protect others around you: Learn how to safely use, store and throw away your medicines at www.disposemymeds.org.          This list is accurate as of 2/8/18 10:27 AM.  Always use your most recent med list.                   Brand Name Dispense Instructions for use Diagnosis    BENADRYL 25 MG tablet   Generic drug:  diphenhydrAMINE      Take 25 mg by mouth At Bedtime        simvastatin 40 MG tablet    ZOCOR    90 tablet    Take 1 tablet (40 mg) by mouth At Bedtime    Hyperlipidemia LDL goal <160

## 2018-03-07 ENCOUNTER — OFFICE VISIT (OUTPATIENT)
Dept: PLASTIC SURGERY | Facility: CLINIC | Age: 58
End: 2018-03-07
Payer: COMMERCIAL

## 2018-03-07 VITALS
WEIGHT: 251 LBS | OXYGEN SATURATION: 100 % | HEIGHT: 72 IN | SYSTOLIC BLOOD PRESSURE: 145 MMHG | HEART RATE: 74 BPM | BODY MASS INDEX: 34 KG/M2 | DIASTOLIC BLOOD PRESSURE: 90 MMHG

## 2018-03-07 DIAGNOSIS — L90.5 SCAR: Primary | ICD-10-CM

## 2018-03-07 ASSESSMENT — PAIN SCALES - GENERAL: PAINLEVEL: NO PAIN (0)

## 2018-03-07 NOTE — LETTER
3/7/2018       RE: Elliot Norris  [address]  [facility] [address]     Dear Colleague,    Thank you for referring your patient, Elliot Norris, to the [facility] PLASTIC AND RECONSTRUCTIVE SURGERY at [facility]. Please see a copy of my visit note below.    Service Date: 03/07/2018      REFERRING PROVIDER:  Paulino Roberto MD, primary care physician.      PRESENTING COMPLAINT:  Consultation for a scar on his scalp.      HISTORY OF PRESENTING COMPLAINT:  Mr. Norris is 57 years old.  Back in 09/2017 had a basal cell carcinoma removed from the left frontal area of his scalp through Mohs excision and primary closure.  He feels that the scar is tugging and he would like to see if anything can be done to improve this.  No infection history and otherwise happy with the overall aesthetics of the area.      PAST MEDICAL HISTORY:  Hypertension, obesity, hypercholesterolemia.      PAST SURGICAL HISTORY:  Appendectomy and some kind of hand surgery.      MEDICATIONS:  Simvastatin.      ALLERGIES:  Nil.      SOCIAL HISTORY:  Does not smoke, socially drinks.  Lives in [place] and is an .      REVIEW OF SYSTEMS:  Denies chest pain, shortness of breath, MI, CVA, DVT and PE.      PHYSICAL EXAMINATION:  Vital signs stable.  He is afebrile, in no obvious distress.  He is 6 feet, 250 pounds, BMI 32.4 kg/m2.  On examination of his scalp, he has a well-healed, aesthetic, narrow scar that is vertically oriented in the frontoparietal scalp region.  It is mobile.  There is no tethering to the underlying bone.  There is no tenderness.      ASSESSMENT AND PLAN:  Based on above findings, a diagnosis of a well-healed left scalp scar was made.  I had a nikole conversation with the patient about healing.  I do not think surgery is what is needed as I do not think there is any pathology in the scar.  There is no scar contracture that needs to be released.  I advised  aggressive moisturizing, sun protection and giving it time as it takes about a year for scars to mature.  All questions were answered.  All exam was done in the presence of a chaperone.  I will see him back on a p.r.n. basis.      Total time spent with the patient 20 minutes, more than half was counseling.     D: 2018   T: 2018   MT: ANTONIA      Name:     ISIDRO SANTOS   MRN:      [id number]        Account:      [id number]   :      1960           Service Date: 2018      Document: [id number]       Again, thank you for allowing me to participate in the care of your patient.      Sincerely,    ESTEFANY Montano MD    cc:   Paulino Roberto MD   [address], [address]   [place]  [address]

## 2018-03-08 NOTE — PROGRESS NOTES
Service Date: 03/07/2018      REFERRING PROVIDER:  Paulino Roberto MD, primary care physician.      PRESENTING COMPLAINT:  Consultation for a scar on his scalp.      HISTORY OF PRESENTING COMPLAINT:  Mr. Norris is 57 years old.  Back in 09/2017 had a basal cell carcinoma removed from the left frontal area of his scalp through Mohs excision and primary closure.  He feels that the scar is tugging and he would like to see if anything can be done to improve this.  No infection history and otherwise happy with the overall aesthetics of the area.      PAST MEDICAL HISTORY:  Hypertension, obesity, hypercholesterolemia.      PAST SURGICAL HISTORY:  Appendectomy and some kind of hand surgery.      MEDICATIONS:  Simvastatin.      ALLERGIES:  Nil.      SOCIAL HISTORY:  Does not smoke, socially drinks.  Lives in [place] and is an .      REVIEW OF SYSTEMS:  Denies chest pain, shortness of breath, MI, CVA, DVT and PE.      PHYSICAL EXAMINATION:  Vital signs stable.  He is afebrile, in no obvious distress.  He is 6 feet, 250 pounds, BMI 32.4 kg/m2.  On examination of his scalp, he has a well-healed, aesthetic, narrow scar that is vertically oriented in the frontoparietal scalp region.  It is mobile.  There is no tethering to the underlying bone.  There is no tenderness.      ASSESSMENT AND PLAN:  Based on above findings, a diagnosis of a well-healed left scalp scar was made.  I had a nikole conversation with the patient about healing.  I do not think surgery is what is needed as I do not think there is any pathology in the scar.  There is no scar contracture that needs to be released.  I advised aggressive moisturizing, sun protection and giving it time as it takes about a year for scars to mature.  All questions were answered.  All exam was done in the presence of a chaperone.  I will see him back on a p.r.n. basis.      Total time spent with the patient 20 minutes, more than half was counseling.      cc:   Paulino  MD Pardeep   [facility]   [address], [address]   [place]  [address]         M REN GRANADOS MD             D: 2018   T: 2018   MT: ANTONIA      Name:     ISIDRO SANTOS   MRN:      [id number]        Account:      [id number]   :      1960           Service Date: 2018      Document: [id number]

## 2018-05-14 ENCOUNTER — OFFICE VISIT (OUTPATIENT)
Dept: FAMILY MEDICINE | Facility: CLINIC | Age: 58
End: 2018-05-14
Payer: COMMERCIAL

## 2018-05-14 VITALS — HEART RATE: 75 BPM | DIASTOLIC BLOOD PRESSURE: 88 MMHG | SYSTOLIC BLOOD PRESSURE: 147 MMHG | OXYGEN SATURATION: 95 %

## 2018-05-14 DIAGNOSIS — L57.0 AK (ACTINIC KERATOSIS): ICD-10-CM

## 2018-05-14 DIAGNOSIS — D22.9 MULTIPLE BENIGN MELANOCYTIC NEVI: ICD-10-CM

## 2018-05-14 DIAGNOSIS — D48.5 NEOPLASM OF UNCERTAIN BEHAVIOR OF SKIN: ICD-10-CM

## 2018-05-14 DIAGNOSIS — L82.1 SEBORRHEIC KERATOSES: ICD-10-CM

## 2018-05-14 DIAGNOSIS — Z85.828 HISTORY OF BASAL CELL CARCINOMA: ICD-10-CM

## 2018-05-14 DIAGNOSIS — Z12.83 SKIN CANCER SCREENING: Primary | ICD-10-CM

## 2018-05-14 DIAGNOSIS — L91.8 INFLAMED SKIN TAG: ICD-10-CM

## 2018-05-14 PROCEDURE — 99213 OFFICE O/P EST LOW 20 MIN: CPT | Mod: 25 | Performed by: FAMILY MEDICINE

## 2018-05-14 PROCEDURE — 11301 SHAVE SKIN LESION 0.6-1.0 CM: CPT | Mod: 59 | Performed by: FAMILY MEDICINE

## 2018-05-14 PROCEDURE — 17000 DESTRUCT PREMALG LESION: CPT | Mod: 51 | Performed by: FAMILY MEDICINE

## 2018-05-14 PROCEDURE — 88305 TISSUE EXAM BY PATHOLOGIST: CPT | Mod: TC | Performed by: FAMILY MEDICINE

## 2018-05-14 PROCEDURE — 11300 SHAVE SKIN LESION 0.5 CM/<: CPT | Mod: 59 | Performed by: FAMILY MEDICINE

## 2018-05-14 PROCEDURE — 11200 RMVL SKIN TAGS UP TO&INC 15: CPT | Mod: 59 | Performed by: FAMILY MEDICINE

## 2018-05-14 PROCEDURE — 17003 DESTRUCT PREMALG LES 2-14: CPT | Performed by: FAMILY MEDICINE

## 2018-05-14 NOTE — PATIENT INSTRUCTIONS
"FUTURE APPOINTMENTS    Follow up in 6 months for scalp/face/neck/arms skin cancer screening. Consider field treatment (either topical fluorouracil 5% cream or photodynamic therapy) in fall 2018.    Follow up in 1 year(s) for a full-body skin cancer screening.    Follow up per pathology report.    Gently massage the scar area daily on the left frontal scalp. Plastic surgery referral can be considered in Sept. 2018. Make sure to also protect the area with sunscreen or a hat.    WOUND CARE INSTRUCTIONS  1. Wash hands before every dressing change.  2. After 24 hours, change dressing daily.  3. Wash the wound area with a mild soap, then rinse.  4. Gently pat dry with a sterile gauze or Q-tip.  5. Using a Q-tip, apply Vaseline or Aquaphor only over entire wound. Do NOT use Neosporin - as many people react to neomycin.  6. Finally, cover with a bandage or sterile non-stick gauze with micropore paper tape.  7. Repeat once daily until wound has healed.      Soap, water and shampoo will not hurt this area.    Do not go swimming or take baths, but showering is encouraged.    Limit use of the area where the procedure was done for a few days to allow for optimal healing.    If you experience bleeding:  Wash hands and hold firm pressure on the area for 10 minutes without checking to see if the bleeding has stopped. \"Checking\" pulls off the protective wound clot and restarts the bleeding all over again. Re-apply pressure for 10 minutes if necessary to stop bleeding.  Use additional sterile gauze and tape to maintain pressure once bleeding has stopped.  If bleeding continues, then call back to clinic at [phone].    Signs of Infection:  Infection can occur in any area where skin has been disrupted.  If you notice persistent redness, swelling, colored drainage, increasing pain, fever or other signs of infection, please call us at: [phone] and ask to have me or my colleague paged. We will call you back to " discuss.    Pathology Results:  You will be notified, generally via letter or MyChart, in approximately 10 days. If there is anything we need to discuss or further treatment needed, I will call you to discuss it.    PATIENT INFORMATION : WOUNDS  During the healing process you will notice a number of changes. All wounds develop a small halo of redness surrounding the wound.  This means healing is occurring. Severe itching with extensive redness usually indicates sensitivity to the ointment or bandage tape used to dress the wound.  You should call our office if this develops.      Swelling  and/or discoloration around your surgical site is common, particularly when performed around the eye.    All wounds normally drain.  The larger the wound the more drainage there will be.  After 7-10 days, you will notice the wound beginning to shrink and new skin will begin to grow.  The wound is healed when you can see skin has formed over the entire area.  A healed wound has a healthy, shiny look to the surface and is red to dark pink in color to normalize.  Wounds may take approximately 4-6 weeks to heal.  Larger wounds may take 6-8 weeks. After the wound is healed you may discontinue dressing changes.    You may experience a sensation of tightness as your wound heals. This is normal and will gradually subside.    Your healed wound may be sensitive to temperature changes. This sensitivity improves with time, but if you re having a lot of discomfort, try to avoid temperature extremes.    Patients frequently experience itching after their wound appears to have healed because of the continue healing under the skin.  Plain Vaseline will help relieve the itching.    ACTINIC KERATOSES POST-TREATMENT CARE INSTRUCTIONS  Actinic keratoses are benign, scaly or gritty lesions that appear in sun-exposed areas and may progress to skin cancers. For this reason, it is important to treat them before they become cancerous. Liquid nitrogen is the  most commonly used and most effective treatment for actinic keratoses; it is mildly uncomfortable when applied to the skin, but the discomfort rapidly subsides.    Post-Treatment:  You may experience burning and/or stinging immediately following the procedure. The discomfort from the procedure may persist over the next 12-24 hours. The area treated will look pinker and slightly swollen before the healing process begins. You may also notice redness, swelling, tenderness, weeping and crusts or scabs. Healing time is approximately 10-14 days.    Blister - You may or may notice blistering from the freezing. If you develop an uncomfortable blister from today's treatment, you may gently puncture this with a needle that has been cleaned with alcohol. However, do not remove the protective skin layer of the blister.    Scab - After a few days, you may notice scaliness or scab formation. Do not pick at the scabs because this may cause slower healing and a permanent scar.    The skin may appear temporarily darker at the treatment site, but this usually fades over a period of months, provided that the area is protected from the sun.    Care of the areas treated:    Wash the area with a mild cleanser.    Gently pat dry.    Do not rub.     Keep protected from the sun during the healing process and for a full year following treatment as the skin continues to remodel during this time.    Apply Vaseline or Aquaphor ointment sparingly to the site for the first 7 days after treatment.    Do not use Neosporin, as many people eventually develop a medication allergy, that can easily be confused with an infection, to Neomycin.    Return if:  There should not be any residual scaling. If there is any concern that the lesion has persisted after 4-6 weeks, make an appointment for a re-check. Healing time does vary depending on your individual healing process and the area of the body treated. Most patients will be healed in one month.    Signs  "of Infection:  Thankfully this is rare. However if you notice persistent colored drainage, increasing pain, fever or other signs of infection, please call us at: [phone]    SUN PROTECTION INSTRUCTIONS  Sun damage can lead to skin cancer and premature aging of the skin.      The best way to protect from sun damage to your skin is to avoid the sun during peak hours (10 am - 2 pm) even on overcast days.      Use UPF sun-protective clothing, which while more expensive initially provides longer lasting coverage without having to worry about remembering to re-apply.  1. Wear a wide-brimmed hat and sunglasses.   2. Wear sun-protective clothing.  [employer] other [employer] make sun protective clothing that are stylish, comfortable and cool. [employer] and other [employer] make UV arm sleeves suitable for golfing, gardening and other activities.      Sunscreen instructions:  1. Use sunscreens with Zinc Oxide, Titanium Dioxide or Avobenzone to protect from UVA rays.  2. Use SPF 30-50+ to protect from UVB rays.  3. Re-apply every 2 hours even if water resistant.  4. Apply on your face every day even when cloudy and even in the winter. UVA \"aging rays\" penetrate window glass and are just as strong in the winter as in the summer.    Product Recommendations:    Good examples include: Blue Priyank, EltaMD, Solbar    Good daily moisturizers with SPF: Vanicream, CeraVe.    For sensitive skin, consider : Skin[employer]ica Essential Defense Mineral Shield Broad Spectrum SPF 35      Never use tanning beds. Tanning beds are associated with much higher risks of skin cancer.    All tanning damages the skin. Aim for ivory skin year round and you will have less trouble with your skin in years to come. There is no merit in getting \"a base tan\" before a warm weather vacation, as any tanning indicates your body's response to sun damage.    Stop smoking. Smokers have higher rates of skin cancer and also have premature skin " wrinkling.    FYI  You should use about 3 tablespoons of sunscreen to protect your whole body. Thus a typical eight ounce bottle of sunscreen should last 4 applications. Remember, that the SPF rating is compromised if you don t apply enough. Most people only apply 1/2 - 1/3 of the amount they need. Also don t forget areas such as your ears, feet, upper back and harder to reach places. Keep in mind that these amounts should be increased for larger body sizes.    Sunscreens with titanium dioxide and/or zinc oxide in the active ingredients are physical blockers as opposed to chemical blockers. Chemical-free sunscreens should not irritate the skin.    Spray-on sunscreens may be used for touch-up application only, not as a base layer. Also, use with caution around small children due to inhalation risk.    Avoid retinyl palmitate products.    Avoid combination products that include both sunscreen and insect repellant, as sunscreen should be applied every 2 hours, but insect repellant should not be applied as frequently.    SPF means sun protection factor, which is just the degree to which the sunscreen can protect against UVB rays. There is no rating system for UVA rays. SPF is calculated as the time skin will burn when sunscreen is applied vs. skin without sunscreen.    Water resistant sunscreens should be re-applied every 1-2 hours.    For more information:  http://www.skincancer.org/prevention/sun-protection/sunscreen/sunscreens-safe-and-effective    SKIN CANCER SELF-EXAM INSTRUCTIONS  Check every month in the mirror or with a household member. Be aware of any changes, especially bleeding or tenderness. Also, make sure to check your nails for color changes after removal of nail polish.    For melanoma, check for:  A - Asymmetry. One half unlike the other half.  B - Border. Irregular, scalloped, ragged, notched, blurred or poorly defined borders.  C - Color. Color variations from one area to another, with shades of tan,  brown and/or black present. Sometimes white, red or blue.  D - Diameter. Greater than 6 mm (about the size of a pencil eraser). Any new growth of a mole should be concerning and be evaluated.  E - Evolving. A mole or skin lesion that looks different from the rest or is changing in size, shape or color.    For basal cell carcinoma and squamous cell carcinoma, check for:    Sores, shiny bumps, nodules, scaly lesions, or wart-like growths that are itchy, tender, crusting, scabbing, eroding, oozing or bleeding.    Open sores/wounds or reddish/irritated areas that do not heal within 2-3 weeks.    Scar-like areas that are white, yellow or waxy in color.

## 2018-05-14 NOTE — PROGRESS NOTES
[facility] - PRIMARY CARE SKIN    CC : skin cancer screening (full-body)  SUBJECTIVE:                                                    Elliot Norris is a 57 year old male who presents to clinic today for a full-body skin exam because of his history of skin cancer.    Bothersome lesions noticed by the patient or other skin concerns :  Issue One : He complains of residual scarring at previous Mohs excision site on the left frontal scalp. He is most concerned about a tugging sensation.  Issue Two : He has noticed other crusty lesions on the scalp  Issue Three : A new lump has developed in the beard area    Personal history of skin cancer : YES - basal cell carcinoma.  Family history of skin cancer : YES - in father.    Sun Exposure History  Previous history of significant sun exposure:  Blistering sunburns : NO  Tanning beds : NO.  Sunscreen Use : YES.  UV-protective clothing use : NO  Wide-brimmed hats : NO - He has been applying sunscreen on the scalp but irregularly.  UV-protective sunglasses : NO  Avoids mid-day sun : YES    Occupation : self-employed,  (indoor).    Refer to electronic medical record (EMR) for past medical history and medications.    INTEGUMENTARY/SKIN: POSITIVE for changing lesion  ROS : 14 point review of systems was negative except the symptoms listed above in the HPI.    This document serves as a record of the services and decisions personally performed and made by Julia Madrigal MD. It was created on her behalf by Gibson Davidson, a trained medical scribe.  The creation of this document is based on the scribe's personal observations and the provider's statements to the medical scribe.  Gibson Davidson, May 14, 2018 7:26 AM      OBJECTIVE:                                                    GENERAL: healthy, alert and no distress  SKIN: Interiano Skin Type - I.  This patient was examined from the top of the head to the bottom of the feet  including scalp, face, neck, back, chest,  breasts, buttocks, both arms, both legs, both hands, both feet, all 10 fingers and all 10 toes. The dermatoscope was used to help evaluate pigmented lesions.  Skin Pertinent Findings:  Chest, Abdomen : Multiple brown macules of various sizes and shapes most consistent with (benign) melanocytic nevi. Occasional stuck-on appearing papules, raised, brown, coarse-textured, round lesion(s) most consistent with seborrheic keratoses.    Back : Multiple brown macules of various sizes and shapes most consistent with (benign) melanocytic nevi.    Significant Findings:  Left forearm : Well-healed scar   Left frontal scalp : well healed scar    Left abdomen, 12 cm left of midline : 3 mm in size brown macule with irregular pigmentation. ? Atypical nevus ? Other    Back, midline, at T4 : 6 mm in size irregularly pigmented macule. ? Atypical nevus ? Other    Left upper side of nose : 1 mm in size, brown, pedunculated, benign-appearing skin tag(s)  Name : Lesion Removal  Indication : Irritated/inflamed benign skin tag.  Location(s) : left nose - x1.  Completed by : Julia Madrigal MD  Note : Prior to treatment, discussed the risk of pain, blistering, infection, scarring, hypopigmentation, hyperpigmentation, and recurrence or need for retreatment. Benefits of treatment and alternative treatments were also discussed.     The lesion(s) were removed or treated with liquid nitrogen via cryactweezers after alcohol prep    Patient tolerated the procedure well and left in good condition.  Tissue sample sent in : No.  Total number of lesions treated : 1.    Scalp : Multiple 3-4 mm in size, erythematous, scaly, non-indurated lesion(s) most consistent with actinic keratoses.  Name : Liquid Nitrogen Cry-Ac Cryotherapy.  Indication : Pre-malignant lesion.  Location(s) : right forehead - x1, scalp - x9.  Completed by : Julia Madrigal MD  Note : Discussed natural history of lesion and treatment options. Prior to treatment, we discussed inflammation,  tenderness post-procedure, the healing process, and the risks of pain, infection, scarring, blistering, and hypo-/hyperpigmentation after healing. Explained that these lesions may grow back and may need additional treatment or re-treatment. The patient expressed a desire to proceed with cryotherapy.    The lesion(s) was treated with liquid nitrogen Cry-Ac, five second freeze repeated twice with a pause to allow for the area to thaw. If this lesion should recur, then it needs to be re-evaluated.    Patient tolerated the procedure well and left in good condition.  Total number of lesions treated : 10.    MDM : . Discussed cryotherapy vs field treatment for actinic skin damage on the scalp.      ASSESSMENT:                                                      Encounter Diagnoses   Name Primary?     Skin cancer screening Yes     History of basal cell carcinoma      Neoplasm of uncertain behavior of skin      AK (actinic keratosis)      Multiple benign melanocytic nevi      Seborrheic keratoses      Inflamed skin tag          PLAN:                                                    Patient Instructions   FUTURE APPOINTMENTS    Follow up in 6 months for scalp/face/neck/arms skin cancer screening. Consider field treatment (either topical fluorouracil 5% cream or photodynamic therapy) in fall 2018.    Follow up in 1 year(s) for a full-body skin cancer screening.    Follow up per pathology report.    Gently massage the scar area daily on the left frontal scalp. Plastic surgery referral can be considered in Sept. 2018. Make sure to also protect the area with sunscreen or a hat.    WOUND CARE INSTRUCTIONS  1. Wash hands before every dressing change.  2. After 24 hours, change dressing daily.  3. Wash the wound area with a mild soap, then rinse.  4. Gently pat dry with a sterile gauze or Q-tip.  5. Using a Q-tip, apply Vaseline or Aquaphor only over entire wound. Do NOT use Neosporin - as many people react to neomycin.  6.  "Finally, cover with a bandage or sterile non-stick gauze with micropore paper tape.  7. Repeat once daily until wound has healed.      Soap, water and shampoo will not hurt this area.    Do not go swimming or take baths, but showering is encouraged.    Limit use of the area where the procedure was done for a few days to allow for optimal healing.    If you experience bleeding:  Wash hands and hold firm pressure on the area for 10 minutes without checking to see if the bleeding has stopped. \"Checking\" pulls off the protective wound clot and restarts the bleeding all over again. Re-apply pressure for 10 minutes if necessary to stop bleeding.  Use additional sterile gauze and tape to maintain pressure once bleeding has stopped.  If bleeding continues, then call back to clinic at [phone].    Signs of Infection:  Infection can occur in any area where skin has been disrupted.  If you notice persistent redness, swelling, colored drainage, increasing pain, fever or other signs of infection, please call us at: [phone] and ask to have me or my colleague paged. We will call you back to discuss.    Pathology Results:  You will be notified, generally via letter or MyChart, in approximately 10 days. If there is anything we need to discuss or further treatment needed, I will call you to discuss it.    PATIENT INFORMATION : WOUNDS  During the healing process you will notice a number of changes. All wounds develop a small halo of redness surrounding the wound.  This means healing is occurring. Severe itching with extensive redness usually indicates sensitivity to the ointment or bandage tape used to dress the wound.  You should call our office if this develops.      Swelling  and/or discoloration around your surgical site is common, particularly when performed around the eye.    All wounds normally drain.  The larger the wound the more drainage there will be.  After 7-10 days, you will notice the wound beginning to " shrink and new skin will begin to grow.  The wound is healed when you can see skin has formed over the entire area.  A healed wound has a healthy, shiny look to the surface and is red to dark pink in color to normalize.  Wounds may take approximately 4-6 weeks to heal.  Larger wounds may take 6-8 weeks. After the wound is healed you may discontinue dressing changes.    You may experience a sensation of tightness as your wound heals. This is normal and will gradually subside.    Your healed wound may be sensitive to temperature changes. This sensitivity improves with time, but if you re having a lot of discomfort, try to avoid temperature extremes.    Patients frequently experience itching after their wound appears to have healed because of the continue healing under the skin.  Plain Vaseline will help relieve the itching.    ACTINIC KERATOSES POST-TREATMENT CARE INSTRUCTIONS  Actinic keratoses are benign, scaly or gritty lesions that appear in sun-exposed areas and may progress to skin cancers. For this reason, it is important to treat them before they become cancerous. Liquid nitrogen is the most commonly used and most effective treatment for actinic keratoses; it is mildly uncomfortable when applied to the skin, but the discomfort rapidly subsides.    Post-Treatment:  You may experience burning and/or stinging immediately following the procedure. The discomfort from the procedure may persist over the next 12-24 hours. The area treated will look pinker and slightly swollen before the healing process begins. You may also notice redness, swelling, tenderness, weeping and crusts or scabs. Healing time is approximately 10-14 days.    Blister - You may or may notice blistering from the freezing. If you develop an uncomfortable blister from today's treatment, you may gently puncture this with a needle that has been cleaned with alcohol. However, do not remove the protective skin layer of the blister.    Scab - After a  few days, you may notice scaliness or scab formation. Do not pick at the scabs because this may cause slower healing and a permanent scar.    The skin may appear temporarily darker at the treatment site, but this usually fades over a period of months, provided that the area is protected from the sun.    Care of the areas treated:    Wash the area with a mild cleanser.    Gently pat dry.    Do not rub.     Keep protected from the sun during the healing process and for a full year following treatment as the skin continues to remodel during this time.    Apply Vaseline or Aquaphor ointment sparingly to the site for the first 7 days after treatment.    Do not use Neosporin, as many people eventually develop a medication allergy, that can easily be confused with an infection, to Neomycin.    Return if:  There should not be any residual scaling. If there is any concern that the lesion has persisted after 4-6 weeks, make an appointment for a re-check. Healing time does vary depending on your individual healing process and the area of the body treated. Most patients will be healed in one month.    Signs of Infection:  Thankfully this is rare. However if you notice persistent colored drainage, increasing pain, fever or other signs of infection, please call us at: [phone]    SUN PROTECTION INSTRUCTIONS  Sun damage can lead to skin cancer and premature aging of the skin.      The best way to protect from sun damage to your skin is to avoid the sun during peak hours (10 am - 2 pm) even on overcast days.      Use UPF sun-protective clothing, which while more expensive initially provides longer lasting coverage without having to worry about remembering to re-apply.  1. Wear a wide-brimmed hat and sunglasses.   2. Wear sun-protective clothing.  [employer] other [employer] make sun protective clothing that are stylish, comfortable and cool. [employer] and other [employer] make UV arm sleeves suitable for  "golfing, gardening and other activities.      Sunscreen instructions:  1. Use sunscreens with Zinc Oxide, Titanium Dioxide or Avobenzone to protect from UVA rays.  2. Use SPF 30-50+ to protect from UVB rays.  3. Re-apply every 2 hours even if water resistant.  4. Apply on your face every day even when cloudy and even in the winter. UVA \"aging rays\" penetrate window glass and are just as strong in the winter as in the summer.    Product Recommendations:    Good examples include: Blue Lizard, EltaMD, Solbar    Good daily moisturizers with SPF: Vanicream, CeraVe.    For sensitive skin, consider : SkinMedica Essential Defense Mineral Shield Broad Spectrum SPF 35      Never use tanning beds. Tanning beds are associated with much higher risks of skin cancer.    All tanning damages the skin. Aim for ivory skin year round and you will have less trouble with your skin in years to come. There is no merit in getting \"a base tan\" before a warm weather vacation, as any tanning indicates your body's response to sun damage.    Stop smoking. Smokers have higher rates of skin cancer and also have premature skin wrinkling.    FYI  You should use about 3 tablespoons of sunscreen to protect your whole body. Thus a typical eight ounce bottle of sunscreen should last 4 applications. Remember, that the SPF rating is compromised if you don t apply enough. Most people only apply 1/2 - 1/3 of the amount they need. Also don t forget areas such as your ears, feet, upper back and harder to reach places. Keep in mind that these amounts should be increased for larger body sizes.    Sunscreens with titanium dioxide and/or zinc oxide in the active ingredients are physical blockers as opposed to chemical blockers. Chemical-free sunscreens should not irritate the skin.    Spray-on sunscreens may be used for touch-up application only, not as a base layer. Also, use with caution around small children due to inhalation risk.    Avoid retinyl palmitate " products.    Avoid combination products that include both sunscreen and insect repellant, as sunscreen should be applied every 2 hours, but insect repellant should not be applied as frequently.    SPF means sun protection factor, which is just the degree to which the sunscreen can protect against UVB rays. There is no rating system for UVA rays. SPF is calculated as the time skin will burn when sunscreen is applied vs. skin without sunscreen.    Water resistant sunscreens should be re-applied every 1-2 hours.    For more information:  http://www.skincancer.org/prevention/sun-protection/sunscreen/sunscreens-safe-and-effective    SKIN CANCER SELF-EXAM INSTRUCTIONS  Check every month in the mirror or with a household member. Be aware of any changes, especially bleeding or tenderness. Also, make sure to check your nails for color changes after removal of nail polish.    For melanoma, check for:  A - Asymmetry. One half unlike the other half.  B - Border. Irregular, scalloped, ragged, notched, blurred or poorly defined borders.  C - Color. Color variations from one area to another, with shades of tan, brown and/or black present. Sometimes white, red or blue.  D - Diameter. Greater than 6 mm (about the size of a pencil eraser). Any new growth of a mole should be concerning and be evaluated.  E - Evolving. A mole or skin lesion that looks different from the rest or is changing in size, shape or color.    For basal cell carcinoma and squamous cell carcinoma, check for:    Sores, shiny bumps, nodules, scaly lesions, or wart-like growths that are itchy, tender, crusting, scabbing, eroding, oozing or bleeding.    Open sores/wounds or reddish/irritated areas that do not heal within 2-3 weeks.    Scar-like areas that are white, yellow or waxy in color.    The patient was counseled about sunscreens and sun avoidance. The patient was counseled to check the skin regularly and report any lesion that is new, changing, itching,  scabbing, bleeding or otherwise bothersome. The patient was discharged ambulatory and in stable condition.      PROCEDURES:                                                    Name : Shave Excision  Indication : Excision of tissue for pathology evaluation.  Location(s) : Left abdomen, 12 cm left of midline : 3 mm in size brown macule with irregular pigmentation. ? Atypical nevus ? Other.  Completed by : Julia Madrigal MD  Photo Taken : no.  Anesthesia : Patient was anesthetized by infiltrating the area surrounding the lesion with 1% lidocaine.   epinephrine [id number] : Yes.  Buffered with bicarbonate : Yes.  Note : Discussed the risk of pain, infection, scarring, hypo- or hyperpigmentation and recurrence or need for re-treatment. The benefits of treatment and alternative treatments were also discussed.    During this procedure, the universal protocol was utilized. The patient's identity was confirmed by no less than two patient identifiers, correct procedure was verified, correct site was verified and marked as applicable and a final pause was completed.    Sterile technique was used throughout the procedure. The skin was cleaned and prepped with surgical cleanser. Once adequate anesthesia was obtained, the lesion was removed with a deep scallop shave procedure. The specimen was sent to pathology.    Direct pressure and aluminum chloride and monopolar cautery was applied for hemostasis. No bleeding was present upon the completion of the procedure. The wound was coated with antibacterial ointment. A dry sterile dressing was applied. Patient tolerated the procedure well and left in satisfactory condition.    Primary provider and referring provider will be informed regarding the tissue report when it returns.    Name : Shave Excision  Indication : Excision of tissue for pathology evaluation.  Location(s) : Back, midline, at T4 : 6 mm in size irregularly pigmented macule. ? Atypical nevus ? Other.  Completed by : Julia  MD Kaitlin  Photo Taken : no.  Anesthesia : Patient was anesthetized by infiltrating the area surrounding the lesion with 1% lidocaine.   epinephrine [id number] : Yes.  Buffered with bicarbonate : Yes.  Note : Discussed the risk of pain, infection, scarring, hypo- or hyperpigmentation and recurrence or need for re-treatment. The benefits of treatment and alternative treatments were also discussed.    During this procedure, the universal protocol was utilized. The patient's identity was confirmed by no less than two patient identifiers, correct procedure was verified, correct site was verified and marked as applicable and a final pause was completed.    Sterile technique was used throughout the procedure. The skin was cleaned and prepped with surgical cleanser. Once adequate anesthesia was obtained, the lesion was removed with a deep scallop shave procedure. The specimen was sent to pathology.    Direct pressure and aluminum chloride and monopolar cautery was applied for hemostasis. No bleeding was present upon the completion of the procedure. The wound was coated with antibacterial ointment. A dry sterile dressing was applied. Patient tolerated the procedure well and left in satisfactory condition.    Primary provider and referring provider will be informed regarding the tissue report when it returns.      The information in this document, created by the medical scribe for me, accurately reflects the services I personally performed and the decisions made by me. I have reviewed and approved this document for accuracy prior to leaving the patient care area.  Julia Madrigal MD May 14, 2018 7:26 AM  [facility] [place]

## 2018-05-14 NOTE — MR AVS SNAPSHOT
"              After Visit Summary   5/14/2018    Elliot Norris    MRN: [id number]           Patient Information     Date Of Birth          1960        Visit Information        Provider Department      5/14/2018 7:20 AM Parisa Madrigal MD [facility] [place] [facility]        Today's Diagnoses     Skin cancer screening    -  1    History of basal cell carcinoma        Neoplasm of uncertain behavior of skin        AK (actinic keratosis)        Multiple benign melanocytic nevi        Seborrheic keratoses        Inflamed skin tag          Care Instructions    FUTURE APPOINTMENTS    Follow up in 6 months for scalp/face/neck/arms skin cancer screening. Consider field treatment (either topical fluorouracil 5% cream or photodynamic therapy) in fall 2018.    Follow up in 1 year(s) for a full-body skin cancer screening.    Follow up per pathology report.    Gently massage the scar area daily on the left frontal scalp. Plastic surgery referral can be considered in Sept. 2018. Make sure to also protect the area with sunscreen or a hat.    WOUND CARE INSTRUCTIONS  1. Wash hands before every dressing change.  2. After 24 hours, change dressing daily.  3. Wash the wound area with a mild soap, then rinse.  4. Gently pat dry with a sterile gauze or Q-tip.  5. Using a Q-tip, apply Vaseline or Aquaphor only over entire wound. Do NOT use Neosporin - as many people react to neomycin.  6. Finally, cover with a bandage or sterile non-stick gauze with micropore paper tape.  7. Repeat once daily until wound has healed.      Soap, water and shampoo will not hurt this area.    Do not go swimming or take baths, but showering is encouraged.    Limit use of the area where the procedure was done for a few days to allow for optimal healing.    If you experience bleeding:  Wash hands and hold firm pressure on the area for 10 minutes without checking to see if the bleeding has stopped. \"Checking\" pulls off the protective " wound clot and restarts the bleeding all over again. Re-apply pressure for 10 minutes if necessary to stop bleeding.  Use additional sterile gauze and tape to maintain pressure once bleeding has stopped.  If bleeding continues, then call back to clinic at [phone].    Signs of Infection:  Infection can occur in any area where skin has been disrupted.  If you notice persistent redness, swelling, colored drainage, increasing pain, fever or other signs of infection, please call us at: [phone] and ask to have me or my colleague paged. We will call you back to discuss.    Pathology Results:  You will be notified, generally via letter or MyChart, in approximately 10 days. If there is anything we need to discuss or further treatment needed, I will call you to discuss it.    PATIENT INFORMATION : WOUNDS  During the healing process you will notice a number of changes. All wounds develop a small halo of redness surrounding the wound.  This means healing is occurring. Severe itching with extensive redness usually indicates sensitivity to the ointment or bandage tape used to dress the wound.  You should call our office if this develops.      Swelling  and/or discoloration around your surgical site is common, particularly when performed around the eye.    All wounds normally drain.  The larger the wound the more drainage there will be.  After 7-10 days, you will notice the wound beginning to shrink and new skin will begin to grow.  The wound is healed when you can see skin has formed over the entire area.  A healed wound has a healthy, shiny look to the surface and is red to dark pink in color to normalize.  Wounds may take approximately 4-6 weeks to heal.  Larger wounds may take 6-8 weeks. After the wound is healed you may discontinue dressing changes.    You may experience a sensation of tightness as your wound heals. This is normal and will gradually subside.    Your healed wound may be sensitive to temperature  changes. This sensitivity improves with time, but if you re having a lot of discomfort, try to avoid temperature extremes.    Patients frequently experience itching after their wound appears to have healed because of the continue healing under the skin.  Plain Vaseline will help relieve the itching.    ACTINIC KERATOSES POST-TREATMENT CARE INSTRUCTIONS  Actinic keratoses are benign, scaly or gritty lesions that appear in sun-exposed areas and may progress to skin cancers. For this reason, it is important to treat them before they become cancerous. Liquid nitrogen is the most commonly used and most effective treatment for actinic keratoses; it is mildly uncomfortable when applied to the skin, but the discomfort rapidly subsides.    Post-Treatment:  You may experience burning and/or stinging immediately following the procedure. The discomfort from the procedure may persist over the next 12-24 hours. The area treated will look pinker and slightly swollen before the healing process begins. You may also notice redness, swelling, tenderness, weeping and crusts or scabs. Healing time is approximately 10-14 days.    Blister - You may or may notice blistering from the freezing. If you develop an uncomfortable blister from today's treatment, you may gently puncture this with a needle that has been cleaned with alcohol. However, do not remove the protective skin layer of the blister.    Scab - After a few days, you may notice scaliness or scab formation. Do not pick at the scabs because this may cause slower healing and a permanent scar.    The skin may appear temporarily darker at the treatment site, but this usually fades over a period of months, provided that the area is protected from the sun.    Care of the areas treated:    Wash the area with a mild cleanser.    Gently pat dry.    Do not rub.     Keep protected from the sun during the healing process and for a full year following treatment as the skin continues to  "remodel during this time.    Apply Vaseline or Aquaphor ointment sparingly to the site for the first 7 days after treatment.    Do not use Neosporin, as many people eventually develop a medication allergy, that can easily be confused with an infection, to Neomycin.    Return if:  There should not be any residual scaling. If there is any concern that the lesion has persisted after 4-6 weeks, make an appointment for a re-check. Healing time does vary depending on your individual healing process and the area of the body treated. Most patients will be healed in one month.    Signs of Infection:  Thankfully this is rare. However if you notice persistent colored drainage, increasing pain, fever or other signs of infection, please call us at: [phone]    SUN PROTECTION INSTRUCTIONS  Sun damage can lead to skin cancer and premature aging of the skin.      The best way to protect from sun damage to your skin is to avoid the sun during peak hours (10 am - 2 pm) even on overcast days.      Use UPF sun-protective clothing, which while more expensive initially provides longer lasting coverage without having to worry about remembering to re-apply.  1. Wear a wide-brimmed hat and sunglasses.   2. Wear sun-protective clothing.  [employer] and other [employer] make sun protective clothing that are stylish, comfortable and cool. [employer] and other [employer] make UV arm sleeves suitable for golfing, gardening and other activities.      Sunscreen instructions:  1. Use sunscreens with Zinc Oxide, Titanium Dioxide or Avobenzone to protect from UVA rays.  2. Use SPF 30-50+ to protect from UVB rays.  3. Re-apply every 2 hours even if water resistant.  4. Apply on your face every day even when cloudy and even in the winter. UVA \"aging rays\" penetrate window glass and are just as strong in the winter as in the summer.    Product Recommendations:    Good examples include: Jose Miguel Yost, EltaMD, Silvestre    Good daily " "moisturizers with SPF: Vanicream, CeraVe.    For sensitive skin, consider : Skin[employer]ica Essential Defense Mineral Shield Broad Spectrum SPF 35      Never use tanning beds. Tanning beds are associated with much higher risks of skin cancer.    All tanning damages the skin. Aim for ivory skin year round and you will have less trouble with your skin in years to come. There is no merit in getting \"a base tan\" before a warm weather vacation, as any tanning indicates your body's response to sun damage.    Stop smoking. Smokers have higher rates of skin cancer and also have premature skin wrinkling.    FYI  You should use about 3 tablespoons of sunscreen to protect your whole body. Thus a typical eight ounce bottle of sunscreen should last 4 applications. Remember, that the SPF rating is compromised if you don t apply enough. Most people only apply 1/2 - 1/3 of the amount they need. Also don t forget areas such as your ears, feet, upper back and harder to reach places. Keep in mind that these amounts should be increased for larger body sizes.    Sunscreens with titanium dioxide and/or zinc oxide in the active ingredients are physical blockers as opposed to chemical blockers. Chemical-free sunscreens should not irritate the skin.    Spray-on sunscreens may be used for touch-up application only, not as a base layer. Also, use with caution around small children due to inhalation risk.    Avoid retinyl palmitate products.    Avoid combination products that include both sunscreen and insect repellant, as sunscreen should be applied every 2 hours, but insect repellant should not be applied as frequently.    SPF means sun protection factor, which is just the degree to which the sunscreen can protect against UVB rays. There is no rating system for UVA rays. SPF is calculated as the time skin will burn when sunscreen is applied vs. skin without sunscreen.    Water resistant sunscreens should be re-applied every 1-2 hours.    For more " information:  http://www.skincancer.org/prevention/sun-protection/sunscreen/sunscreens-safe-and-effective    SKIN CANCER SELF-EXAM INSTRUCTIONS  Check every month in the mirror or with a household member. Be aware of any changes, especially bleeding or tenderness. Also, make sure to check your nails for color changes after removal of nail polish.    For melanoma, check for:  A - Asymmetry. One half unlike the other half.  B - Border. Irregular, scalloped, ragged, notched, blurred or poorly defined borders.  C - Color. Color variations from one area to another, with shades of tan, brown and/or black present. Sometimes white, red or blue.  D - Diameter. Greater than 6 mm (about the size of a pencil eraser). Any new growth of a mole should be concerning and be evaluated.  E - Evolving. A mole or skin lesion that looks different from the rest or is changing in size, shape or color.    For basal cell carcinoma and squamous cell carcinoma, check for:    Sores, shiny bumps, nodules, scaly lesions, or wart-like growths that are itchy, tender, crusting, scabbing, eroding, oozing or bleeding.    Open sores/wounds or reddish/irritated areas that do not heal within 2-3 weeks.    Scar-like areas that are white, yellow or waxy in color.          Follow-ups after your visit        Who to contact     If you have questions or need follow up information about today's clinic visit or your schedule please contact [facility] [place] directly at [phone].  Normal or non-critical lab and imaging results will be communicated to you by FastCAPhart, letter or phone within 4 business days after the clinic has received the results. If you do not hear from us within 7 days, please contact the clinic through FastCAPhart or phone. If you have a critical or abnormal lab result, we will notify you by phone as soon as possible.  Submit refill requests through St[employer] or call your pharmacy and they will forward the refill request to us. Please  allow 3 business days for your refill to be completed.          Additional Information About Your Visit        MyChart Information     [email or web address] gives you secure access to your electronic health record. If you see a primary care provider, you can also send messages to your care team and make appointments. If you have questions, please call your primary care clinic.  If you do not have a primary care provider, please call [phone] and they will assist you.        Care EveryWhere ID     This is your Care EveryWhere ID. This could be used by other organizations to access your [place] medical records  [id number]        Your Vitals Were     Pulse Pulse Oximetry                75 95%           Blood Pressure from Last 3 Encounters:   05/14/18 147/88   03/07/18 145/90   02/08/18 142/86    Weight from Last 3 Encounters:   03/07/18 251 lb (113.9 kg)   02/08/18 251 lb 12.8 oz (114.2 kg)   06/19/17 239 lb (108.4 kg)              Today, you had the following     No orders found for display       Primary Care Provider Office Phone # Fax #    Paulino Adrián Roberto [phone] [address] [facility] [address]        Equal Access to Services     SONAM JACKSON : Hadii aad ku hadasho Soomaali, waaxda luqadaha, qaybta kaalmada adeegyada, werner huynh. So [facility] [phone].    ATENCIÓN: Si habla español, tiene a pinon disposición servicios gratuitos de asistencia lingüística. Llame al [phone].    We comply with applicable federal civil rights laws and [place] laws. We do not discriminate on the basis of race, color, national origin, age, disability, sex, sexual orientation, or gender identity.            Thank you!     Thank you for choosing [facility] [place]  for your care. Our goal is always to provide you with excellent care. Hearing back from our patients is one way we can continue to improve our services. Please take a few minutes to complete the  written survey that you may receive in the mail after your visit with us. Thank you!             Your Updated Medication List - Protect others around you: Learn how to safely use, store and throw away your medicines at www.disposemymeds.org.          This list is accurate as of 5/14/18  7:48 AM.  Always use your most recent med list.                   Brand Name Dispense Instructions for use Diagnosis    BENADRYL 25 MG tablet   Generic drug:  diphenhydrAMINE      Take 25 mg by mouth At Bedtime        simvastatin 40 MG tablet    ZOCOR    90 tablet    Take 1 tablet (40 mg) by mouth At Bedtime    Hyperlipidemia LDL goal <160

## 2018-05-14 NOTE — LETTER
5/14/2018         RE: Elliot Norris  [address]  [facility] [address]        Dear Colleague,    Thank you for referring your patient, Elliot Norris, to the [facility]. Please see a copy of my visit note below.    [facility] - PRIMARY CARE SKIN    CC : skin cancer screening (full-body)  SUBJECTIVE:                                                    Elliot Norris is a 57 year old male who presents to clinic today for a full-body skin exam because of his history of skin cancer.    Bothersome lesions noticed by the patient or other skin concerns :  Issue One : He complains of residual scarring at previous Mohs excision site on the left frontal scalp. He is most concerned about a tugging sensation.  Issue Two : He has noticed other crusty lesions on the scalp  Issue Three : A new lump has developed in the beard area    Personal history of skin cancer : YES - basal cell carcinoma.  Family history of skin cancer : YES - in father.    Sun Exposure History  Previous history of significant sun exposure:  Blistering sunburns : NO  Tanning beds : NO.  Sunscreen Use : YES.  UV-protective clothing use : NO  Wide-brimmed hats : NO - He has been applying sunscreen on the scalp but irregularly.  UV-protective sunglasses : NO  Avoids mid-day sun : YES    Occupation : self-employed,  (indoor).    Refer to electronic medical record (EMR) for past medical history and medications.    INTEGUMENTARY/SKIN: POSITIVE for changing lesion  ROS : 14 point review of systems was negative except the symptoms listed above in the HPI.    This document serves as a record of the services and decisions personally performed and made by Julia Madrigal MD. It was created on her behalf by Gibson Davidson, a trained medical scribe.  The creation of this document is based on the scribe's personal observations and the provider's statements to the medical scribe.  Gibson Davidson, May 14, 2018  7:26 AM      OBJECTIVE:                                                    GENERAL: healthy, alert and no distress  SKIN: Interiano Skin Type - I.  This patient was examined from the top of the head to the bottom of the feet  including scalp, face, neck, back, chest, breasts, buttocks, both arms, both legs, both hands, both feet, all 10 fingers and all 10 toes. The dermatoscope was used to help evaluate pigmented lesions.  Skin Pertinent Findings:  Chest, Abdomen : Multiple brown macules of various sizes and shapes most consistent with (benign) melanocytic nevi. Occasional stuck-on appearing papules, raised, brown, coarse-textured, round lesion(s) most consistent with seborrheic keratoses.    Back : Multiple brown macules of various sizes and shapes most consistent with (benign) melanocytic nevi.    Significant Findings:  Left forearm : Well-healed scar   Left frontal scalp : well healed scar    Left abdomen, 12 cm left of midline : 3 mm in size brown macule with irregular pigmentation. ? Atypical nevus ? Other    Back, midline, at T4 : 6 mm in size irregularly pigmented macule. ? Atypical nevus ? Other    Left upper side of nose : 1 mm in size, brown, pedunculated, benign-appearing skin tag(s)  Name : Lesion Removal  Indication : Irritated/inflamed benign skin tag.  Location(s) : left nose - x1.  Completed by : Julia Madrigal MD  Note : Prior to treatment, discussed the risk of pain, blistering, infection, scarring, hypopigmentation, hyperpigmentation, and recurrence or need for retreatment. Benefits of treatment and alternative treatments were also discussed.     The lesion(s) were removed or treated with liquid nitrogen via cryactweezers after alcohol prep    Patient tolerated the procedure well and left in good condition.  Tissue sample sent in : No.  Total number of lesions treated : 1.    Scalp : Multiple 3-4 mm in size, erythematous, scaly, non-indurated lesion(s) most consistent with actinic  keratoses.  Name : Liquid Nitrogen Cry-Ac Cryotherapy.  Indication : Pre-malignant lesion.  Location(s) : right forehead - x1, scalp - x9.  Completed by : Julia Madrigal MD  Note : Discussed natural history of lesion and treatment options. Prior to treatment, we discussed inflammation, tenderness post-procedure, the healing process, and the risks of pain, infection, scarring, blistering, and hypo-/hyperpigmentation after healing. Explained that these lesions may grow back and may need additional treatment or re-treatment. The patient expressed a desire to proceed with cryotherapy.    The lesion(s) was treated with liquid nitrogen Cry-Ac, five second freeze repeated twice with a pause to allow for the area to thaw. If this lesion should recur, then it needs to be re-evaluated.    Patient tolerated the procedure well and left in good condition.  Total number of lesions treated : 10.    MDM : . Discussed cryotherapy vs field treatment for actinic skin damage on the scalp.      ASSESSMENT:                                                      Encounter Diagnoses   Name Primary?     Skin cancer screening Yes     History of basal cell carcinoma      Neoplasm of uncertain behavior of skin      AK (actinic keratosis)      Multiple benign melanocytic nevi      Seborrheic keratoses      Inflamed skin tag          PLAN:                                                    Patient Instructions   FUTURE APPOINTMENTS    Follow up in 6 months for scalp/face/neck/arms skin cancer screening. Consider field treatment (either topical fluorouracil 5% cream or photodynamic therapy) in fall 2018.    Follow up in 1 year(s) for a full-body skin cancer screening.    Follow up per pathology report.    Gently massage the scar area daily on the left frontal scalp. Plastic surgery referral can be considered in Sept. 2018. Make sure to also protect the area with sunscreen or a hat.    WOUND CARE INSTRUCTIONS  1. Wash hands before every dressing  "change.  2. After 24 hours, change dressing daily.  3. Wash the wound area with a mild soap, then rinse.  4. Gently pat dry with a sterile gauze or Q-tip.  5. Using a Q-tip, apply Vaseline or Aquaphor only over entire wound. Do NOT use Neosporin - as many people react to neomycin.  6. Finally, cover with a bandage or sterile non-stick gauze with micropore paper tape.  7. Repeat once daily until wound has healed.      Soap, water and shampoo will not hurt this area.    Do not go swimming or take baths, but showering is encouraged.    Limit use of the area where the procedure was done for a few days to allow for optimal healing.    If you experience bleeding:  Wash hands and hold firm pressure on the area for 10 minutes without checking to see if the bleeding has stopped. \"Checking\" pulls off the protective wound clot and restarts the bleeding all over again. Re-apply pressure for 10 minutes if necessary to stop bleeding.  Use additional sterile gauze and tape to maintain pressure once bleeding has stopped.  If bleeding continues, then call back to clinic at [phone].    Signs of Infection:  Infection can occur in any area where skin has been disrupted.  If you notice persistent redness, swelling, colored drainage, increasing pain, fever or other signs of infection, please call us at: [phone] and ask to have me or my colleague paged. We will call you back to discuss.    Pathology Results:  You will be notified, generally via letter or MyChart, in approximately 10 days. If there is anything we need to discuss or further treatment needed, I will call you to discuss it.    PATIENT INFORMATION : WOUNDS  During the healing process you will notice a number of changes. All wounds develop a small halo of redness surrounding the wound.  This means healing is occurring. Severe itching with extensive redness usually indicates sensitivity to the ointment or bandage tape used to dress the wound.  You should call our " office if this develops.      Swelling  and/or discoloration around your surgical site is common, particularly when performed around the eye.    All wounds normally drain.  The larger the wound the more drainage there will be.  After 7-10 days, you will notice the wound beginning to shrink and new skin will begin to grow.  The wound is healed when you can see skin has formed over the entire area.  A healed wound has a healthy, shiny look to the surface and is red to dark pink in color to normalize.  Wounds may take approximately 4-6 weeks to heal.  Larger wounds may take 6-8 weeks. After the wound is healed you may discontinue dressing changes.    You may experience a sensation of tightness as your wound heals. This is normal and will gradually subside.    Your healed wound may be sensitive to temperature changes. This sensitivity improves with time, but if you re having a lot of discomfort, try to avoid temperature extremes.    Patients frequently experience itching after their wound appears to have healed because of the continue healing under the skin.  Plain Vaseline will help relieve the itching.    ACTINIC KERATOSES POST-TREATMENT CARE INSTRUCTIONS  Actinic keratoses are benign, scaly or gritty lesions that appear in sun-exposed areas and may progress to skin cancers. For this reason, it is important to treat them before they become cancerous. Liquid nitrogen is the most commonly used and most effective treatment for actinic keratoses; it is mildly uncomfortable when applied to the skin, but the discomfort rapidly subsides.    Post-Treatment:  You may experience burning and/or stinging immediately following the procedure. The discomfort from the procedure may persist over the next 12-24 hours. The area treated will look pinker and slightly swollen before the healing process begins. You may also notice redness, swelling, tenderness, weeping and crusts or scabs. Healing time is approximately 10-14  days.    Blister - You may or may notice blistering from the freezing. If you develop an uncomfortable blister from today's treatment, you may gently puncture this with a needle that has been cleaned with alcohol. However, do not remove the protective skin layer of the blister.    Scab - After a few days, you may notice scaliness or scab formation. Do not pick at the scabs because this may cause slower healing and a permanent scar.    The skin may appear temporarily darker at the treatment site, but this usually fades over a period of months, provided that the area is protected from the sun.    Care of the areas treated:    Wash the area with a mild cleanser.    Gently pat dry.    Do not rub.     Keep protected from the sun during the healing process and for a full year following treatment as the skin continues to remodel during this time.    Apply Vaseline or Aquaphor ointment sparingly to the site for the first 7 days after treatment.    Do not use Neosporin, as many people eventually develop a medication allergy, that can easily be confused with an infection, to Neomycin.    Return if:  There should not be any residual scaling. If there is any concern that the lesion has persisted after 4-6 weeks, make an appointment for a re-check. Healing time does vary depending on your individual healing process and the area of the body treated. Most patients will be healed in one month.    Signs of Infection:  Thankfully this is rare. However if you notice persistent colored drainage, increasing pain, fever or other signs of infection, please call us at: [phone]    SUN PROTECTION INSTRUCTIONS  Sun damage can lead to skin cancer and premature aging of the skin.      The best way to protect from sun damage to your skin is to avoid the sun during peak hours (10 am - 2 pm) even on overcast days.      Use UPF sun-protective clothing, which while more expensive initially provides longer lasting coverage without having to  "worry about remembering to re-apply.  1. Wear a wide-brimmed hat and sunglasses.   2. Wear sun-protective clothing.  [employer] and other companies make sun protective clothing that are stylish, comfortable and cool. [employer] and other spotflux make UV arm sleeves suitable for golfing, gardening and other activities.      Sunscreen instructions:  1. Use sunscreens with Zinc Oxide, Titanium Dioxide or Avobenzone to protect from UVA rays.  2. Use SPF 30-50+ to protect from UVB rays.  3. Re-apply every 2 hours even if water resistant.  4. Apply on your face every day even when cloudy and even in the winter. UVA \"aging rays\" penetrate window glass and are just as strong in the winter as in the summer.    Product Recommendations:    Good examples include: Blue Lizard, EltaMD, Solbar    Good daily moisturizers with SPF: Vanicream, CeraVe.    For sensitive skin, consider : SkinMedica Essential Defense Mineral Shield Broad Spectrum SPF 35      Never use tanning beds. Tanning beds are associated with much higher risks of skin cancer.    All tanning damages the skin. Aim for ivory skin year round and you will have less trouble with your skin in years to come. There is no merit in getting \"a base tan\" before a warm weather vacation, as any tanning indicates your body's response to sun damage.    Stop smoking. Smokers have higher rates of skin cancer and also have premature skin wrinkling.    FYI  You should use about 3 tablespoons of sunscreen to protect your whole body. Thus a typical eight ounce bottle of sunscreen should last 4 applications. Remember, that the SPF rating is compromised if you don t apply enough. Most people only apply 1/2 - 1/3 of the amount they need. Also don t forget areas such as your ears, feet, upper back and harder to reach places. Keep in mind that these amounts should be increased for larger body sizes.    Sunscreens with titanium dioxide and/or zinc oxide in the active ingredients " are physical blockers as opposed to chemical blockers. Chemical-free sunscreens should not irritate the skin.    Spray-on sunscreens may be used for touch-up application only, not as a base layer. Also, use with caution around small children due to inhalation risk.    Avoid retinyl palmitate products.    Avoid combination products that include both sunscreen and insect repellant, as sunscreen should be applied every 2 hours, but insect repellant should not be applied as frequently.    SPF means sun protection factor, which is just the degree to which the sunscreen can protect against UVB rays. There is no rating system for UVA rays. SPF is calculated as the time skin will burn when sunscreen is applied vs. skin without sunscreen.    Water resistant sunscreens should be re-applied every 1-2 hours.    For more information:  http://www.skincancer.org/prevention/sun-protection/sunscreen/sunscreens-safe-and-effective    SKIN CANCER SELF-EXAM INSTRUCTIONS  Check every month in the mirror or with a household member. Be aware of any changes, especially bleeding or tenderness. Also, make sure to check your nails for color changes after removal of nail polish.    For melanoma, check for:  A - Asymmetry. One half unlike the other half.  B - Border. Irregular, scalloped, ragged, notched, blurred or poorly defined borders.  C - Color. Color variations from one area to another, with shades of tan, brown and/or black present. Sometimes white, red or blue.  D - Diameter. Greater than 6 mm (about the size of a pencil eraser). Any new growth of a mole should be concerning and be evaluated.  E - Evolving. A mole or skin lesion that looks different from the rest or is changing in size, shape or color.    For basal cell carcinoma and squamous cell carcinoma, check for:    Sores, shiny bumps, nodules, scaly lesions, or wart-like growths that are itchy, tender, crusting, scabbing, eroding, oozing or bleeding.    Open sores/wounds or  reddish/irritated areas that do not heal within 2-3 weeks.    Scar-like areas that are white, yellow or waxy in color.    The patient was counseled about sunscreens and sun avoidance. The patient was counseled to check the skin regularly and report any lesion that is new, changing, itching, scabbing, bleeding or otherwise bothersome. The patient was discharged ambulatory and in stable condition.      PROCEDURES:                                                    Name : Shave Excision  Indication : Excision of tissue for pathology evaluation.  Location(s) : Left abdomen, 12 cm left of midline : 3 mm in size brown macule with irregular pigmentation. ? Atypical nevus ? Other.  Completed by : Julia Madrigal MD  Photo Taken : no.  Anesthesia : Patient was anesthetized by infiltrating the area surrounding the lesion with 1% lidocaine.   epinephrine [id number] : Yes.  Buffered with bicarbonate : Yes.  Note : Discussed the risk of pain, infection, scarring, hypo- or hyperpigmentation and recurrence or need for re-treatment. The benefits of treatment and alternative treatments were also discussed.    During this procedure, the universal protocol was utilized. The patient's identity was confirmed by no less than two patient identifiers, correct procedure was verified, correct site was verified and marked as applicable and a final pause was completed.    Sterile technique was used throughout the procedure. The skin was cleaned and prepped with surgical cleanser. Once adequate anesthesia was obtained, the lesion was removed with a deep scallop shave procedure. The specimen was sent to pathology.    Direct pressure and aluminum chloride and monopolar cautery was applied for hemostasis. No bleeding was present upon the completion of the procedure. The wound was coated with antibacterial ointment. A dry sterile dressing was applied. Patient tolerated the procedure well and left in satisfactory condition.    Primary provider and  referring provider will be informed regarding the tissue report when it returns.    Name : Shave Excision  Indication : Excision of tissue for pathology evaluation.  Location(s) : Back, midline, at T4 : 6 mm in size irregularly pigmented macule. ? Atypical nevus ? Other.  Completed by : Julia Madrigal MD  Photo Taken : no.  Anesthesia : Patient was anesthetized by infiltrating the area surrounding the lesion with 1% lidocaine.   epinephrine [id number] : Yes.  Buffered with bicarbonate : Yes.  Note : Discussed the risk of pain, infection, scarring, hypo- or hyperpigmentation and recurrence or need for re-treatment. The benefits of treatment and alternative treatments were also discussed.    During this procedure, the universal protocol was utilized. The patient's identity was confirmed by no less than two patient identifiers, correct procedure was verified, correct site was verified and marked as applicable and a final pause was completed.    Sterile technique was used throughout the procedure. The skin was cleaned and prepped with surgical cleanser. Once adequate anesthesia was obtained, the lesion was removed with a deep scallop shave procedure. The specimen was sent to pathology.    Direct pressure and aluminum chloride and monopolar cautery was applied for hemostasis. No bleeding was present upon the completion of the procedure. The wound was coated with antibacterial ointment. A dry sterile dressing was applied. Patient tolerated the procedure well and left in satisfactory condition.    Primary provider and referring provider will be informed regarding the tissue report when it returns.      The information in this document, created by the medical scribe for me, accurately reflects the services I personally performed and the decisions made by me. I have reviewed and approved this document for accuracy prior to leaving the patient care area.  Julia Madrigal MD May 14, 2018 7:26 AM  [facility]  [place]    Again, thank you for allowing me to participate in the care of your patient.        Sincerely,        Parisa Madrigal MD

## 2018-05-18 LAB — COPATH REPORT: NORMAL

## 2018-05-22 ENCOUNTER — TELEPHONE (OUTPATIENT)
Dept: FAMILY MEDICINE | Facility: CLINIC | Age: 58
End: 2018-05-22

## 2018-05-22 NOTE — TELEPHONE ENCOUNTER
Left message on answering machine for patient to call back.        Notes Recorded by Parisa Madrigal MD on 5/22/2018 at 9:45 AM  Please call and let her know that the lesion was benign but mild atypical. Please explain atypical nevus. If this should recur then it should be evaluated.  Recommendations: yearly skin exams.    Thank you ,

## 2018-05-22 NOTE — TELEPHONE ENCOUNTER
Patient notified of test results and providers message, patient has no further questions.    Farrah BACONRN BSN  [facility]  [phone]

## 2018-09-25 ENCOUNTER — OFFICE VISIT (OUTPATIENT)
Dept: FAMILY MEDICINE | Facility: CLINIC | Age: 58
End: 2018-09-25
Payer: COMMERCIAL

## 2018-09-25 VITALS — OXYGEN SATURATION: 98 % | HEART RATE: 87 BPM | DIASTOLIC BLOOD PRESSURE: 81 MMHG | SYSTOLIC BLOOD PRESSURE: 140 MMHG

## 2018-09-25 DIAGNOSIS — L57.0 AK (ACTINIC KERATOSIS): ICD-10-CM

## 2018-09-25 DIAGNOSIS — Z12.83 SKIN CANCER SCREENING: Primary | ICD-10-CM

## 2018-09-25 DIAGNOSIS — D22.9 MULTIPLE BENIGN MELANOCYTIC NEVI: ICD-10-CM

## 2018-09-25 DIAGNOSIS — Z85.828 HISTORY OF BASAL CELL CARCINOMA: ICD-10-CM

## 2018-09-25 DIAGNOSIS — Z86.018 HISTORY OF DYSPLASTIC NEVUS: ICD-10-CM

## 2018-09-25 PROCEDURE — 99213 OFFICE O/P EST LOW 20 MIN: CPT | Mod: 25 | Performed by: FAMILY MEDICINE

## 2018-09-25 PROCEDURE — 17000 DESTRUCT PREMALG LESION: CPT | Performed by: FAMILY MEDICINE

## 2018-09-25 PROCEDURE — 17003 DESTRUCT PREMALG LES 2-14: CPT | Performed by: FAMILY MEDICINE

## 2018-09-25 NOTE — PATIENT INSTRUCTIONS
FUTURE APPOINTMENTS  Follow up in 1 year(s) for a full-body skin cancer screening.    Begin gently massaging the scar once daily.    ACTINIC KERATOSES POST-TREATMENT CARE INSTRUCTIONS  Actinic keratoses are benign, scaly or gritty lesions that appear in sun-exposed areas and may progress to skin cancers. For this reason, it is important to treat them before they become cancerous. Liquid nitrogen is the most commonly used and most effective treatment for actinic keratoses; it is mildly uncomfortable when applied to the skin, but the discomfort rapidly subsides.    Post-Treatment:  You may experience burning and/or stinging immediately following the procedure. The discomfort from the procedure may persist over the next 12-24 hours. The area treated will look pinker and slightly swollen before the healing process begins. You may also notice redness, swelling, tenderness, weeping and crusts or scabs. Healing time is approximately 10-14 days.    Blister - You may or may notice blistering from the freezing. If you develop an uncomfortable blister from today's treatment, you may gently puncture this with a needle that has been cleaned with alcohol. However, do not remove the protective skin layer of the blister.    Scab - After a few days, you may notice scaliness or scab formation. Do not pick at the scabs because this may cause slower healing and a permanent scar.    The skin may appear temporarily darker at the treatment site, but this usually fades over a period of months, provided that the area is protected from the sun.    Care of the areas treated:    Wash the area with a mild cleanser.    Gently pat dry.    Do not rub.     Keep protected from the sun during the healing process and for a full year following treatment as the skin continues to remodel during this time.    Apply Vaseline or Aquaphor ointment sparingly to the site for the first 7 days after treatment.    Do not use Neosporin, as many people eventually  "develop a medication allergy, that can easily be confused with an infection, to Neomycin.    Return if:  There should not be any residual scaling. If there is any concern that the lesion has persisted after 4-6 weeks, make an appointment for a re-check. Healing time does vary depending on your individual healing process and the area of the body treated. Most patients will be healed in one month.    Signs of Infection:  Thankfully this is rare. However if you notice persistent colored drainage, increasing pain, fever or other signs of infection, please call us at: [phone]    SUN PROTECTION INSTRUCTIONS  Sun damage can lead to skin cancer and premature aging of the skin.      The best way to protect from sun damage to your skin is to avoid the sun during peak hours (10 am - 2 pm) even on overcast days.      Use UPF sun-protective clothing, which while more expensive initially provides longer lasting coverage without having to worry about remembering to re-apply.  1. Wear a wide-brimmed hat and sunglasses.   2. Wear sun-protective clothing.  [employer] and other [employer] make sun protective clothing that are stylish, comfortable and cool. [employer] and other [employer] make UV arm sleeves suitable for golfing, gardening and other activities.      Sunscreen instructions:  1. Use sunscreens with Zinc Oxide, Titanium Dioxide or Avobenzone to protect from UVA rays.  2. Use SPF 30-50+ to protect from UVB rays.  3. Re-apply every 2 hours even if water resistant.  4. Apply on your face every day even when cloudy and even in the winter. UVA \"aging rays\" penetrate window glass and are just as strong in the winter as in the summer.    Product Recommendations:    Good examples include: Blue Lizard, EltaMD, Solbar    Good daily moisturizers with SPF: Vanicream, CeraVe.    For sensitive skin, consider : SkinMedica Essential Defense Mineral Shield Broad Spectrum SPF 35      Never use tanning beds. Tanning beds " "are associated with much higher risks of skin cancer.    All tanning damages the skin. Aim for ivory skin year round and you will have less trouble with your skin in years to come. There is no merit in getting \"a base tan\" before a warm weather vacation, as any tanning indicates your body's response to sun damage.    Stop smoking. Smokers have higher rates of skin cancer and also have premature skin wrinkling.    FYI  You should use about 3 tablespoons of sunscreen to protect your whole body. Thus a typical eight ounce bottle of sunscreen should last 4 applications. Remember, that the SPF rating is compromised if you don t apply enough. Most people only apply 1/2 - 1/3 of the amount they need. Also don t forget areas such as your ears, feet, upper back and harder to reach places. Keep in mind that these amounts should be increased for larger body sizes.    Sunscreens with titanium dioxide and/or zinc oxide in the active ingredients are physical blockers as opposed to chemical blockers. Chemical-free sunscreens should not irritate the skin.    Spray-on sunscreens may be used for touch-up application only, not as a base layer. Also, use with caution around small children due to inhalation risk.    Avoid retinyl palmitate products.    Avoid combination products that include both sunscreen and insect repellant, as sunscreen should be applied every 2 hours, but insect repellant should not be applied as frequently.    SPF means sun protection factor, which is just the degree to which the sunscreen can protect against UVB rays. There is no rating system for UVA rays. SPF is calculated as the time skin will burn when sunscreen is applied vs. skin without sunscreen.    Water resistant sunscreens should be re-applied every 1-2 hours.    For more information:  http://www.skincancer.org/prevention/sun-protection/sunscreen/sunscreens-safe-and-effective    SKIN CANCER SELF-EXAM INSTRUCTIONS  Check every month in the mirror or " with a household member. Be aware of any changes, especially bleeding or tenderness. Also, make sure to check your nails for color changes after removal of nail polish.    For melanoma, check for:  A - Asymmetry. One half unlike the other half.  B - Border. Irregular, scalloped, ragged, notched, blurred or poorly defined borders.  C - Color. Color variations from one area to another, with shades of tan, brown and/or black present. Sometimes white, red or blue.  D - Diameter. Greater than 6 mm (about the size of a pencil eraser). Any new growth of a mole should be concerning and be evaluated.  E - Evolving. A mole or skin lesion that looks different from the rest or is changing in size, shape or color.    For basal cell carcinoma and squamous cell carcinoma, check for:    Sores, shiny bumps, nodules, scaly lesions, or wart-like growths that are itchy, tender, crusting, scabbing, eroding, oozing or bleeding.    Open sores/wounds or reddish/irritated areas that do not heal within 2-3 weeks.    Scar-like areas that are white, yellow or waxy in color.

## 2018-09-25 NOTE — PROGRESS NOTES
[facility] - PRIMARY CARE SKIN    CC : skin cancer screening (full-body)  SUBJECTIVE:                                                    Elliot Norris is a 58 year old male who presents to clinic today for a full-body skin cancer screening because of his history of skin cancer.    Bothersome lesions noticed by the patient or other skin concerns :  Issue One : A scar on the left forehead is still painful described as a tugging sensation. He has had Mohs excision of a basal cell carcinoma at this site.    Issue Two : He has followed wound care instructions for basal cell carcinoma Mohs excision on the left forearm.    Personal history of skin cancer : YES - basal cell carcinoma, actinic keratoses, mild dysplastic nevus(i) on the left abdomen and back midline at T4  Family history of skin cancer : YES - in father.     Sun Exposure History  Previous history of significant sun exposure:  Blistering sunburns : NO  Tanning beds : NO.  Sunscreen Use : YES.  UV-protective clothing use : NO  Wide-brimmed hats : NO - He has been applying sunscreen on the scalp but irregularly.  UV-protective sunglasses : NO  Avoids mid-day sun : YES     Occupation : self-employed,  (indoor).    Refer to electronic medical record (EMR) for past medical history and medications.    INTEGUMENTARY/SKIN: POSITIVE for non-healing lesion  ROS : 14 point review of systems was negative except the symptoms listed above in the HPI.    This document serves as a record of the services and decisions personally performed and made by Julia Madrigal MD. It was created on her behalf by Gibson Davidson, a trained medical scribe.  The creation of this document is based on the scribe's personal observations and the provider's statements to the medical scribe.  Gibson Davidson, September 25, 2018 7:17 AM      OBJECTIVE:                                                    GENERAL: healthy, alert and no distress  SKIN: Interiano Skin Type - I.  This  patient was examined from the top of the head to the bottom of the feet  including scalp, face, neck, back, chest, breasts, buttocks, both arms, both legs, both hands, both feet, all 10 fingers and all 10 toes. The dermatoscope was used to help evaluate pigmented lesions.  Skin Pertinent Findings:  Chest, Abdomen : Scattered brown macules of various sizes and shapes most consistent with (benign) melanocytic nevi.    Back : Scattered multiple brown macules of various sizes and shapes most consistent with (benign) melanocytic nevi.    Significant Findings:    Left forearm : well-healed scar.    Left frontal scalp: 2-3 mm in size, erythematous, scaly, non-indurated lesion(s) most consistent with actinic keratoses.  Name : Liquid Nitrogen Cry-Ac Cryotherapy.  Indication : Pre-malignant lesion.  Location(s) : scalp - x3.  Completed by : Julia Madrigal MD  Note : Discussed natural history of lesion and treatment options. Prior to treatment, we discussed inflammation, tenderness post-procedure, the healing process, and the risks of pain, infection, scarring, blistering, and hypo-/hyperpigmentation after healing. Explained that these lesions may grow back and may need additional treatment or re-treatment. The patient expressed a desire to proceed with cryotherapy.    The lesion(s) was treated with liquid nitrogen Cry-Ac, five second freeze repeated twice with a pause to allow for the area to thaw. If this lesion should recur, then it needs to be re-evaluated.    Patient tolerated the procedure well and left in good condition.  Total number of lesions treated : 3.    Diagnostic Test Results:  none           ASSESSMENT:                                                      Encounter Diagnoses   Name Primary?     Skin cancer screening Yes     History of basal cell carcinoma      History of dysplastic nevus      AK (actinic keratosis)      Multiple benign melanocytic nevi          PLAN:                                                     Patient Instructions   FUTURE APPOINTMENTS  Follow up in 1 year(s) for a full-body skin cancer screening.    Begin gently massaging the scar once daily.    ACTINIC KERATOSES POST-TREATMENT CARE INSTRUCTIONS  Actinic keratoses are benign, scaly or gritty lesions that appear in sun-exposed areas and may progress to skin cancers. For this reason, it is important to treat them before they become cancerous. Liquid nitrogen is the most commonly used and most effective treatment for actinic keratoses; it is mildly uncomfortable when applied to the skin, but the discomfort rapidly subsides.    Post-Treatment:  You may experience burning and/or stinging immediately following the procedure. The discomfort from the procedure may persist over the next 12-24 hours. The area treated will look pinker and slightly swollen before the healing process begins. You may also notice redness, swelling, tenderness, weeping and crusts or scabs. Healing time is approximately 10-14 days.    Blister - You may or may notice blistering from the freezing. If you develop an uncomfortable blister from today's treatment, you may gently puncture this with a needle that has been cleaned with alcohol. However, do not remove the protective skin layer of the blister.    Scab - After a few days, you may notice scaliness or scab formation. Do not pick at the scabs because this may cause slower healing and a permanent scar.    The skin may appear temporarily darker at the treatment site, but this usually fades over a period of months, provided that the area is protected from the sun.    Care of the areas treated:    Wash the area with a mild cleanser.    Gently pat dry.    Do not rub.     Keep protected from the sun during the healing process and for a full year following treatment as the skin continues to remodel during this time.    Apply Vaseline or Aquaphor ointment sparingly to the site for the first 7 days after treatment.    Do not use  "Neosporin, as many people eventually develop a medication allergy, that can easily be confused with an infection, to Neomycin.    Return if:  There should not be any residual scaling. If there is any concern that the lesion has persisted after 4-6 weeks, make an appointment for a re-check. Healing time does vary depending on your individual healing process and the area of the body treated. Most patients will be healed in one month.    Signs of Infection:  Thankfully this is rare. However if you notice persistent colored drainage, increasing pain, fever or other signs of infection, please call us at: [phone]    SUN PROTECTION INSTRUCTIONS  Sun damage can lead to skin cancer and premature aging of the skin.      The best way to protect from sun damage to your skin is to avoid the sun during peak hours (10 am - 2 pm) even on overcast days.      Use UPF sun-protective clothing, which while more expensive initially provides longer lasting coverage without having to worry about remembering to re-apply.  1. Wear a wide-brimmed hat and sunglasses.   2. Wear sun-protective clothing.  [employer] and other [employer] make sun protective clothing that are stylish, comfortable and cool. [employer] and other [email or web address] make UV arm sleeves suitable for golfing, gardening and other activities.      Sunscreen instructions:  1. Use sunscreens with Zinc Oxide, Titanium Dioxide or Avobenzone to protect from UVA rays.  2. Use SPF 30-50+ to protect from UVB rays.  3. Re-apply every 2 hours even if water resistant.  4. Apply on your face every day even when cloudy and even in the winter. UVA \"aging rays\" penetrate window glass and are just as strong in the winter as in the summer.    Product Recommendations:    Good examples include: Blue Lizard, EltaMD, Solbar    Good daily moisturizers with SPF: Vanicream, CeraVe.    For sensitive skin, consider : SkinMedica Essential Defense Mineral Shield Broad Spectrum SPF " "35      Never use tanning beds. Tanning beds are associated with much higher risks of skin cancer.    All tanning damages the skin. Aim for ivory skin year round and you will have less trouble with your skin in years to come. There is no merit in getting \"a base tan\" before a warm weather vacation, as any tanning indicates your body's response to sun damage.    Stop smoking. Smokers have higher rates of skin cancer and also have premature skin wrinkling.    FYI  You should use about 3 tablespoons of sunscreen to protect your whole body. Thus a typical eight ounce bottle of sunscreen should last 4 applications. Remember, that the SPF rating is compromised if you don t apply enough. Most people only apply 1/2 - 1/3 of the amount they need. Also don t forget areas such as your ears, feet, upper back and harder to reach places. Keep in mind that these amounts should be increased for larger body sizes.    Sunscreens with titanium dioxide and/or zinc oxide in the active ingredients are physical blockers as opposed to chemical blockers. Chemical-free sunscreens should not irritate the skin.    Spray-on sunscreens may be used for touch-up application only, not as a base layer. Also, use with caution around small children due to inhalation risk.    Avoid retinyl palmitate products.    Avoid combination products that include both sunscreen and insect repellant, as sunscreen should be applied every 2 hours, but insect repellant should not be applied as frequently.    SPF means sun protection factor, which is just the degree to which the sunscreen can protect against UVB rays. There is no rating system for UVA rays. SPF is calculated as the time skin will burn when sunscreen is applied vs. skin without sunscreen.    Water resistant sunscreens should be re-applied every 1-2 hours.    For more information:  http://www.skincancer.org/prevention/sun-protection/sunscreen/sunscreens-safe-and-effective    SKIN CANCER SELF-EXAM " INSTRUCTIONS  Check every month in the mirror or with a household member. Be aware of any changes, especially bleeding or tenderness. Also, make sure to check your nails for color changes after removal of nail polish.    For melanoma, check for:  A - Asymmetry. One half unlike the other half.  B - Border. Irregular, scalloped, ragged, notched, blurred or poorly defined borders.  C - Color. Color variations from one area to another, with shades of tan, brown and/or black present. Sometimes white, red or blue.  D - Diameter. Greater than 6 mm (about the size of a pencil eraser). Any new growth of a mole should be concerning and be evaluated.  E - Evolving. A mole or skin lesion that looks different from the rest or is changing in size, shape or color.    For basal cell carcinoma and squamous cell carcinoma, check for:    Sores, shiny bumps, nodules, scaly lesions, or wart-like growths that are itchy, tender, crusting, scabbing, eroding, oozing or bleeding.    Open sores/wounds or reddish/irritated areas that do not heal within 2-3 weeks.    Scar-like areas that are white, yellow or waxy in color.    The patient was counseled about sunscreens and sun avoidance. The patient was counseled to check the skin regularly and report any lesion that is new, changing, itching, scabbing, bleeding or otherwise bothersome. The patient was discharged ambulatory and in stable condition.      PROCEDURES:                                                    None.    TT : 25 minutes.  CT : 15 minutes.      The information in this document, created by the medical scribe for me, accurately reflects the services I personally performed and the decisions made by me. I have reviewed and approved this document for accuracy prior to leaving the patient care area.  Julia Madrigal MD September 25, 2018 7:17 AM  [facility] [place]

## 2018-09-25 NOTE — LETTER
9/25/2018         RE: Elliot Norris  [address]  [facility] [address]        Dear Colleague,    Thank you for referring your patient, Elliot Norris, to the [facility]. Please see a copy of my visit note below.    [facility] - PRIMARY CARE SKIN    CC : skin cancer screening (full-body)  SUBJECTIVE:                                                    Elliot Norris is a 58 year old male who presents to clinic today for a full-body skin cancer screening because of his history of skin cancer.    Bothersome lesions noticed by the patient or other skin concerns :  Issue One : A scar on the left forehead is still painful described as a tugging sensation. He has had Mohs excision of a basal cell carcinoma at this site.    Issue Two : He has followed wound care instructions for basal cell carcinoma Mohs excision on the left forearm.    Personal history of skin cancer : YES - basal cell carcinoma, actinic keratoses, mild dysplastic nevus(i) on the left abdomen and back midline at T4  Family history of skin cancer : YES - in father.     Sun Exposure History  Previous history of significant sun exposure:  Blistering sunburns : NO  Tanning beds : NO.  Sunscreen Use : YES.  UV-protective clothing use : NO  Wide-brimmed hats : NO - He has been applying sunscreen on the scalp but irregularly.  UV-protective sunglasses : NO  Avoids mid-day sun : YES     Occupation : self-employed,  (indoor).    Refer to electronic medical record (EMR) for past medical history and medications.    INTEGUMENTARY/SKIN: POSITIVE for non-healing lesion  ROS : 14 point review of systems was negative except the symptoms listed above in the HPI.    This document serves as a record of the services and decisions personally performed and made by Julia Madrigal MD. It was created on her behalf by Gibson Davidson, a trained medical scribe.  The creation of this document is based on the scribe's  personal observations and the provider's statements to the medical scribe.  Gibson Davidson, September 25, 2018 7:17 AM      OBJECTIVE:                                                    GENERAL: healthy, alert and no distress  SKIN: Interiano Skin Type - I.  This patient was examined from the top of the head to the bottom of the feet  including scalp, face, neck, back, chest, breasts, buttocks, both arms, both legs, both hands, both feet, all 10 fingers and all 10 toes. The dermatoscope was used to help evaluate pigmented lesions.  Skin Pertinent Findings:  Chest, Abdomen : Scattered brown macules of various sizes and shapes most consistent with (benign) melanocytic nevi.    Back : Scattered multiple brown macules of various sizes and shapes most consistent with (benign) melanocytic nevi.    Significant Findings:    Left forearm : well-healed scar.    Left frontal scalp: 2-3 mm in size, erythematous, scaly, non-indurated lesion(s) most consistent with actinic keratoses.  Name : Liquid Nitrogen Cry-Ac Cryotherapy.  Indication : Pre-malignant lesion.  Location(s) : scalp - x3.  Completed by : Julia Madrigal MD  Note : Discussed natural history of lesion and treatment options. Prior to treatment, we discussed inflammation, tenderness post-procedure, the healing process, and the risks of pain, infection, scarring, blistering, and hypo-/hyperpigmentation after healing. Explained that these lesions may grow back and may need additional treatment or re-treatment. The patient expressed a desire to proceed with cryotherapy.    The lesion(s) was treated with liquid nitrogen Cry-Ac, five second freeze repeated twice with a pause to allow for the area to thaw. If this lesion should recur, then it needs to be re-evaluated.    Patient tolerated the procedure well and left in good condition.  Total number of lesions treated : 3.    Diagnostic Test Results:  none           ASSESSMENT:                                                       Encounter Diagnoses   Name Primary?     Skin cancer screening Yes     History of basal cell carcinoma      History of dysplastic nevus      AK (actinic keratosis)      Multiple benign melanocytic nevi          PLAN:                                                    Patient Instructions   FUTURE APPOINTMENTS  Follow up in 1 year(s) for a full-body skin cancer screening.    Begin gently massaging the scar once daily.    ACTINIC KERATOSES POST-TREATMENT CARE INSTRUCTIONS  Actinic keratoses are benign, scaly or gritty lesions that appear in sun-exposed areas and may progress to skin cancers. For this reason, it is important to treat them before they become cancerous. Liquid nitrogen is the most commonly used and most effective treatment for actinic keratoses; it is mildly uncomfortable when applied to the skin, but the discomfort rapidly subsides.    Post-Treatment:  You may experience burning and/or stinging immediately following the procedure. The discomfort from the procedure may persist over the next 12-24 hours. The area treated will look pinker and slightly swollen before the healing process begins. You may also notice redness, swelling, tenderness, weeping and crusts or scabs. Healing time is approximately 10-14 days.    Blister - You may or may notice blistering from the freezing. If you develop an uncomfortable blister from today's treatment, you may gently puncture this with a needle that has been cleaned with alcohol. However, do not remove the protective skin layer of the blister.    Scab - After a few days, you may notice scaliness or scab formation. Do not pick at the scabs because this may cause slower healing and a permanent scar.    The skin may appear temporarily darker at the treatment site, but this usually fades over a period of months, provided that the area is protected from the sun.    Care of the areas treated:    Wash the area with a mild cleanser.    Gently pat dry.    Do not rub.  "    Keep protected from the sun during the healing process and for a full year following treatment as the skin continues to remodel during this time.    Apply Vaseline or Aquaphor ointment sparingly to the site for the first 7 days after treatment.    Do not use Neosporin, as many people eventually develop a medication allergy, that can easily be confused with an infection, to Neomycin.    Return if:  There should not be any residual scaling. If there is any concern that the lesion has persisted after 4-6 weeks, make an appointment for a re-check. Healing time does vary depending on your individual healing process and the area of the body treated. Most patients will be healed in one month.    Signs of Infection:  Thankfully this is rare. However if you notice persistent colored drainage, increasing pain, fever or other signs of infection, please call us at: [phone]    SUN PROTECTION INSTRUCTIONS  Sun damage can lead to skin cancer and premature aging of the skin.      The best way to protect from sun damage to your skin is to avoid the sun during peak hours (10 am - 2 pm) even on overcast days.      Use UPF sun-protective clothing, which while more expensive initially provides longer lasting coverage without having to worry about remembering to re-apply.  1. Wear a wide-brimmed hat and sunglasses.   2. Wear sun-protective clothing.  [employer] and other [employer] make sun protective clothing that are stylish, comfortable and cool. [employer] and other [employer] make UV arm sleeves suitable for golfing, gardening and other activities.      Sunscreen instructions:  1. Use sunscreens with Zinc Oxide, Titanium Dioxide or Avobenzone to protect from UVA rays.  2. Use SPF 30-50+ to protect from UVB rays.  3. Re-apply every 2 hours even if water resistant.  4. Apply on your face every day even when cloudy and even in the winter. UVA \"aging rays\" penetrate window glass and are just as strong in the winter " "as in the summer.    Product Recommendations:    Good examples include: Blue Lizard, EltaMD, Solbar    Good daily moisturizers with SPF: Vanicream, CeraVe.    For sensitive skin, consider : SkinMedica Essential Defense Mineral Shield Broad Spectrum SPF 35      Never use tanning beds. Tanning beds are associated with much higher risks of skin cancer.    All tanning damages the skin. Aim for ivory skin year round and you will have less trouble with your skin in years to come. There is no merit in getting \"a base tan\" before a warm weather vacation, as any tanning indicates your body's response to sun damage.    Stop smoking. Smokers have higher rates of skin cancer and also have premature skin wrinkling.    FYI  You should use about 3 tablespoons of sunscreen to protect your whole body. Thus a typical eight ounce bottle of sunscreen should last 4 applications. Remember, that the SPF rating is compromised if you don t apply enough. Most people only apply 1/2 - 1/3 of the amount they need. Also don t forget areas such as your ears, feet, upper back and harder to reach places. Keep in mind that these amounts should be increased for larger body sizes.    Sunscreens with titanium dioxide and/or zinc oxide in the active ingredients are physical blockers as opposed to chemical blockers. Chemical-free sunscreens should not irritate the skin.    Spray-on sunscreens may be used for touch-up application only, not as a base layer. Also, use with caution around small children due to inhalation risk.    Avoid retinyl palmitate products.    Avoid combination products that include both sunscreen and insect repellant, as sunscreen should be applied every 2 hours, but insect repellant should not be applied as frequently.    SPF means sun protection factor, which is just the degree to which the sunscreen can protect against UVB rays. There is no rating system for UVA rays. SPF is calculated as the time skin will burn when sunscreen is " applied vs. skin without sunscreen.    Water resistant sunscreens should be re-applied every 1-2 hours.    For more information:  http://www.skincancer.org/prevention/sun-protection/sunscreen/sunscreens-safe-and-effective    SKIN CANCER SELF-EXAM INSTRUCTIONS  Check every month in the mirror or with a household member. Be aware of any changes, especially bleeding or tenderness. Also, make sure to check your nails for color changes after removal of nail polish.    For melanoma, check for:  A - Asymmetry. One half unlike the other half.  B - Border. Irregular, scalloped, ragged, notched, blurred or poorly defined borders.  C - Color. Color variations from one area to another, with shades of tan, brown and/or black present. Sometimes white, red or blue.  D - Diameter. Greater than 6 mm (about the size of a pencil eraser). Any new growth of a mole should be concerning and be evaluated.  E - Evolving. A mole or skin lesion that looks different from the rest or is changing in size, shape or color.    For basal cell carcinoma and squamous cell carcinoma, check for:    Sores, shiny bumps, nodules, scaly lesions, or wart-like growths that are itchy, tender, crusting, scabbing, eroding, oozing or bleeding.    Open sores/wounds or reddish/irritated areas that do not heal within 2-3 weeks.    Scar-like areas that are white, yellow or waxy in color.    The patient was counseled about sunscreens and sun avoidance. The patient was counseled to check the skin regularly and report any lesion that is new, changing, itching, scabbing, bleeding or otherwise bothersome. The patient was discharged ambulatory and in stable condition.      PROCEDURES:                                                    None.    TT : 25 minutes.  CT : 15 minutes.      The information in this document, created by the medical scribe for me, accurately reflects the services I personally performed and the decisions made by me. I have reviewed and approved this  document for accuracy prior to leaving the patient care area.  Julia Madrigal MD September 25, 2018 7:17 AM  [facility]    Again, thank you for allowing me to participate in the care of your patient.        Sincerely,        Parisa Madrigal MD

## 2018-09-25 NOTE — MR AVS SNAPSHOT
After Visit Summary   9/25/2018    Elliot Norris    MRN: [id number]           Patient Information     Date Of Birth          1960        Visit Information        Provider Department      9/25/2018 7:20 AM Parisa Madrigal MD [facility]        Today's Diagnoses     Skin cancer screening    -  1    History of basal cell carcinoma        History of dysplastic nevus        AK (actinic keratosis)        Multiple benign melanocytic nevi          Care Instructions    FUTURE APPOINTMENTS  Follow up in 1 year(s) for a full-body skin cancer screening.    Begin gently massaging the scar once daily.    ACTINIC KERATOSES POST-TREATMENT CARE INSTRUCTIONS  Actinic keratoses are benign, scaly or gritty lesions that appear in sun-exposed areas and may progress to skin cancers. For this reason, it is important to treat them before they become cancerous. Liquid nitrogen is the most commonly used and most effective treatment for actinic keratoses; it is mildly uncomfortable when applied to the skin, but the discomfort rapidly subsides.    Post-Treatment:  You may experience burning and/or stinging immediately following the procedure. The discomfort from the procedure may persist over the next 12-24 hours. The area treated will look pinker and slightly swollen before the healing process begins. You may also notice redness, swelling, tenderness, weeping and crusts or scabs. Healing time is approximately 10-14 days.    Blister - You may or may notice blistering from the freezing. If you develop an uncomfortable blister from today's treatment, you may gently puncture this with a needle that has been cleaned with alcohol. However, do not remove the protective skin layer of the blister.    Scab - After a few days, you may notice scaliness or scab formation. Do not pick at the scabs because this may cause slower healing and a permanent scar.    The skin may appear temporarily darker at  the treatment site, but this usually fades over a period of months, provided that the area is protected from the sun.    Care of the areas treated:    Wash the area with a mild cleanser.    Gently pat dry.    Do not rub.     Keep protected from the sun during the healing process and for a full year following treatment as the skin continues to remodel during this time.    Apply Vaseline or Aquaphor ointment sparingly to the site for the first 7 days after treatment.    Do not use Neosporin, as many people eventually develop a medication allergy, that can easily be confused with an infection, to Neomycin.    Return if:  There should not be any residual scaling. If there is any concern that the lesion has persisted after 4-6 weeks, make an appointment for a re-check. Healing time does vary depending on your individual healing process and the area of the body treated. Most patients will be healed in one month.    Signs of Infection:  Thankfully this is rare. However if you notice persistent colored drainage, increasing pain, fever or other signs of infection, please call us at: [phone]    SUN PROTECTION INSTRUCTIONS  Sun damage can lead to skin cancer and premature aging of the skin.      The best way to protect from sun damage to your skin is to avoid the sun during peak hours (10 am - 2 pm) even on overcast days.      Use UPF sun-protective clothing, which while more expensive initially provides longer lasting coverage without having to worry about remembering to re-apply.  1. Wear a wide-brimmed hat and sunglasses.   2. Wear sun-protective clothing.  [employer] and other [employer] make sun protective clothing that are stylish, comfortable and cool. [employer] and other Ring make UV arm sleeves suitable for golfing, gardening and other activities.      Sunscreen instructions:  1. Use sunscreens with Zinc Oxide, Titanium Dioxide or Avobenzone to protect from UVA rays.  2. Use SPF 30-50+ to  "protect from UVB rays.  3. Re-apply every 2 hours even if water resistant.  4. Apply on your face every day even when cloudy and even in the winter. UVA \"aging rays\" penetrate window glass and are just as strong in the winter as in the summer.    Product Recommendations:    Good examples include: Blue Lizard, EltaMD, Solbar    Good daily moisturizers with SPF: Vanicream, CeraVe.    For sensitive skin, consider : SkinMedica Essential Defense Mineral Shield Broad Spectrum SPF 35      Never use tanning beds. Tanning beds are associated with much higher risks of skin cancer.    All tanning damages the skin. Aim for ivory skin year round and you will have less trouble with your skin in years to come. There is no merit in getting \"a base tan\" before a warm weather vacation, as any tanning indicates your body's response to sun damage.    Stop smoking. Smokers have higher rates of skin cancer and also have premature skin wrinkling.    FYI  You should use about 3 tablespoons of sunscreen to protect your whole body. Thus a typical eight ounce bottle of sunscreen should last 4 applications. Remember, that the SPF rating is compromised if you don t apply enough. Most people only apply 1/2 - 1/3 of the amount they need. Also don t forget areas such as your ears, feet, upper back and harder to reach places. Keep in mind that these amounts should be increased for larger body sizes.    Sunscreens with titanium dioxide and/or zinc oxide in the active ingredients are physical blockers as opposed to chemical blockers. Chemical-free sunscreens should not irritate the skin.    Spray-on sunscreens may be used for touch-up application only, not as a base layer. Also, use with caution around small children due to inhalation risk.    Avoid retinyl palmitate products.    Avoid combination products that include both sunscreen and insect repellant, as sunscreen should be applied every 2 hours, but insect repellant should not be applied as " frequently.    SPF means sun protection factor, which is just the degree to which the sunscreen can protect against UVB rays. There is no rating system for UVA rays. SPF is calculated as the time skin will burn when sunscreen is applied vs. skin without sunscreen.    Water resistant sunscreens should be re-applied every 1-2 hours.    For more information:  http://www.skincancer.org/prevention/sun-protection/sunscreen/sunscreens-safe-and-effective    SKIN CANCER SELF-EXAM INSTRUCTIONS  Check every month in the mirror or with a household member. Be aware of any changes, especially bleeding or tenderness. Also, make sure to check your nails for color changes after removal of nail polish.    For melanoma, check for:  A - Asymmetry. One half unlike the other half.  B - Border. Irregular, scalloped, ragged, notched, blurred or poorly defined borders.  C - Color. Color variations from one area to another, with shades of tan, brown and/or black present. Sometimes white, red or blue.  D - Diameter. Greater than 6 mm (about the size of a pencil eraser). Any new growth of a mole should be concerning and be evaluated.  E - Evolving. A mole or skin lesion that looks different from the rest or is changing in size, shape or color.    For basal cell carcinoma and squamous cell carcinoma, check for:    Sores, shiny bumps, nodules, scaly lesions, or wart-like growths that are itchy, tender, crusting, scabbing, eroding, oozing or bleeding.    Open sores/wounds or reddish/irritated areas that do not heal within 2-3 weeks.    Scar-like areas that are white, yellow or waxy in color.          Follow-ups after your visit        Who to contact     If you have questions or need follow up information about today's clinic visit or your schedule please contact [facility] [place] directly at [phone].  Normal or non-critical lab and imaging results will be communicated to you by MyChart, letter or phone within 4 business days  after the clinic has received the results. If you do not hear from us within 7 days, please contact the clinic through [employer]ceipt[employer] or phone. If you have a critical or abnormal lab result, we will notify you by phone as soon as possible.  Submit refill requests through [employer]ceipts or call your pharmacy and they will forward the refill request to us. Please allow 3 business days for your refill to be completed.          Additional Information About Your Visit        [employer] Information     [employer] gives you secure access to your electronic health record. If you see a primary care provider, you can also send messages to your care team and make appointments. If you have questions, please call your primary care clinic.  If you do not have a primary care provider, please call [phone] and they will assist you.        Care EveryWhere ID     This is your Care EveryWhere ID. This could be used by other organizations to access your [facility] medical records  [id number]        Your Vitals Were     Pulse Pulse Oximetry                87 98%           Blood Pressure from Last 3 Encounters:   09/25/18 144/83   05/14/18 147/88   03/07/18 145/90    Weight from Last 3 Encounters:   03/07/18 251 lb (113.9 kg)   02/08/18 251 lb 12.8 oz (114.2 kg)   06/19/17 239 lb (108.4 kg)              Today, you had the following     No orders found for display       Primary Care Provider Office Phone # Fax #    Paulino Adrián Roberto [phone] [address] [facility] [address]        Equal Access to Services     SONAM JACKSON : Hadii aad ku hadasho Soomaali, waaxda luqadaha, qaybta kaalmada adeegyada, werner jeronimo . So [facility] [phone].    ATENCIÓN: Si habla español, tiene a pinon disposición servicios gratuitos de asistencia lingüística. Llame al [phone].    We comply with applicable federal civil rights laws and [place] laws. We do not discriminate on the basis of race, color,  national origin, age, disability, sex, sexual orientation, or gender identity.            Thank you!     Thank you for choosing [facility] [place]  for your care. Our goal is always to provide you with excellent care. Hearing back from our patients is one way we can continue to improve our services. Please take a few minutes to complete the written survey that you may receive in the mail after your visit with us. Thank you!             Your Updated Medication List - Protect others around you: Learn how to safely use, store and throw away your medicines at www.disposemymeds.org.          This list is accurate as of 9/25/18  7:44 AM.  Always use your most recent med list.                   Brand Name Dispense Instructions for use Diagnosis    BENADRYL 25 MG tablet   Generic drug:  diphenhydrAMINE      Take 25 mg by mouth At Bedtime        simvastatin 40 MG tablet    ZOCOR    90 tablet    Take 1 tablet (40 mg) by mouth At Bedtime    Hyperlipidemia LDL goal <160

## 2019-08-09 ENCOUNTER — TELEPHONE (OUTPATIENT)
Dept: FAMILY MEDICINE | Facility: CLINIC | Age: 59
End: 2019-08-09

## 2019-08-09 DIAGNOSIS — E78.5 HYPERLIPIDEMIA LDL GOAL <160: ICD-10-CM

## 2019-08-09 DIAGNOSIS — R73.03 PREDIABETES: Primary | ICD-10-CM

## 2019-08-09 DIAGNOSIS — I10 HYPERTENSION GOAL BP (BLOOD PRESSURE) < 140/90: ICD-10-CM

## 2019-08-09 NOTE — TELEPHONE ENCOUNTER
JF,   Patient scheduled to see you for physical 8/20/2019  Has appt for pre physical labs 8/12/2019  See below MyChart   Requesting lab orders  Pended same orders as last year  Please authorize if appropriate.  Thanks,  Narda ZHONG RN

## 2019-08-09 NOTE — TELEPHONE ENCOUNTER
----- Message -----   From: Elliot Norris   Sent: 8/9/2019   2:53 PM   To: Up Reception   Subject: RE: Appointment scheduled from [facility]             Basic metabolic panel, hemoglobin and LIPIDS?  That is what I had before.      I surely need to get back on my simvastatin, and I bet I am diabetic by now.      Vikram   ----- Message -----   From: Narda ZHONG   Sent: 8/9/2019 10:58 AM CDT   To: Elliot Norris   Subject: RE: Appointment scheduled from [facility]   Sonny Zarate,       There are no lab orders in place   You haven't seen Dr Roberto since 2/8/2018 either   What labs are you trying to get drawn?      Narda ZHONG RN         ----- Message -----      From: Elliot Norris      Sent: 8/9/2019  7:44 AM CDT        To: Patient Appointment Schedule Request Mailing List   Subject: Appointment scheduled from [facility]      Appointment For: Elliot Norris ([id number])   Visit Type: [facility] LAB VISIT (833)      8/12/2019    8:15 AM  15 mins.  [facility]                    [facility]      Patient Comments:   The labs I need are on file.

## 2019-08-12 DIAGNOSIS — E78.5 HYPERLIPIDEMIA LDL GOAL <160: ICD-10-CM

## 2019-08-12 DIAGNOSIS — R73.03 PREDIABETES: ICD-10-CM

## 2019-08-12 DIAGNOSIS — I10 HYPERTENSION GOAL BP (BLOOD PRESSURE) < 140/90: ICD-10-CM

## 2019-08-12 LAB
ANION GAP SERPL CALCULATED.3IONS-SCNC: 7 MMOL/L (ref 3–14)
BUN SERPL-MCNC: 15 MG/DL (ref 7–30)
CALCIUM SERPL-MCNC: 8.9 MG/DL (ref 8.5–10.1)
CHLORIDE SERPL-SCNC: 109 MMOL/L (ref 94–109)
CHOLEST SERPL-MCNC: 271 MG/DL
CO2 SERPL-SCNC: 25 MMOL/L (ref 20–32)
CREAT SERPL-MCNC: 1.07 MG/DL (ref 0.66–1.25)
GFR SERPL CREATININE-BSD FRML MDRD: 75 ML/MIN/{1.73_M2}
GLUCOSE SERPL-MCNC: 108 MG/DL (ref 70–99)
HBA1C MFR BLD: 5.8 % (ref 0–5.6)
HDLC SERPL-MCNC: 32 MG/DL
LDLC SERPL CALC-MCNC: 178 MG/DL
NONHDLC SERPL-MCNC: 239 MG/DL
POTASSIUM SERPL-SCNC: 4.2 MMOL/L (ref 3.4–5.3)
SODIUM SERPL-SCNC: 141 MMOL/L (ref 133–144)
TRIGL SERPL-MCNC: 303 MG/DL

## 2019-08-12 PROCEDURE — 80061 LIPID PANEL: CPT | Performed by: FAMILY MEDICINE

## 2019-08-12 PROCEDURE — 83036 HEMOGLOBIN GLYCOSYLATED A1C: CPT | Performed by: FAMILY MEDICINE

## 2019-08-12 PROCEDURE — 36415 COLL VENOUS BLD VENIPUNCTURE: CPT | Performed by: FAMILY MEDICINE

## 2019-08-12 PROCEDURE — 80048 BASIC METABOLIC PNL TOTAL CA: CPT | Performed by: FAMILY MEDICINE

## 2019-08-20 ENCOUNTER — OFFICE VISIT (OUTPATIENT)
Dept: FAMILY MEDICINE | Facility: CLINIC | Age: 59
End: 2019-08-20
Payer: COMMERCIAL

## 2019-08-20 VITALS
HEART RATE: 92 BPM | TEMPERATURE: 98.5 F | OXYGEN SATURATION: 96 % | WEIGHT: 249.4 LBS | DIASTOLIC BLOOD PRESSURE: 88 MMHG | BODY MASS INDEX: 33.78 KG/M2 | SYSTOLIC BLOOD PRESSURE: 135 MMHG | HEIGHT: 72 IN

## 2019-08-20 DIAGNOSIS — L21.9 SEBORRHEIC DERMATITIS: ICD-10-CM

## 2019-08-20 DIAGNOSIS — E78.5 HYPERLIPIDEMIA LDL GOAL <160: ICD-10-CM

## 2019-08-20 DIAGNOSIS — Z00.00 ROUTINE GENERAL MEDICAL EXAMINATION AT A HEALTH CARE FACILITY: Primary | ICD-10-CM

## 2019-08-20 PROCEDURE — 99213 OFFICE O/P EST LOW 20 MIN: CPT | Mod: 25 | Performed by: FAMILY MEDICINE

## 2019-08-20 PROCEDURE — 99396 PREV VISIT EST AGE 40-64: CPT | Performed by: FAMILY MEDICINE

## 2019-08-20 RX ORDER — SIMVASTATIN 40 MG
40 TABLET ORAL AT BEDTIME
Qty: 90 TABLET | Refills: 3 | Status: SHIPPED | OUTPATIENT
Start: 2019-08-20 | End: 2020-09-01

## 2019-08-20 RX ORDER — KETOCONAZOLE 20 MG/G
CREAM TOPICAL DAILY
Qty: 30 G | Refills: 3 | Status: SHIPPED | OUTPATIENT
Start: 2019-08-20 | End: 2020-09-21

## 2019-08-20 ASSESSMENT — MIFFLIN-ST. JEOR: SCORE: 1984.27

## 2019-08-20 NOTE — PROGRESS NOTES
SUBJECTIVE:   CC: Elliot Norris is an 59 year old male who presents for preventative health visit.     Healthy Habits:     Getting at least 3 servings of Calcium per day:  Yes    Bi-annual eye exam:  Yes    Dental care twice a year:  Yes    Sleep apnea or symptoms of sleep apnea:  Daytime drowsiness    Diet:  Regular (no restrictions)    Frequency of exercise:  2-3 days/week    Duration of exercise:  30-45 minutes    Taking medications regularly:  Yes    Medication side effects:  None    PHQ-2 Total Score: 0    Additional concerns today:  Yes      The 10-year ASCVD risk score (Jonathan ESPINOZA Jr., et al., 2013) is: 15.9%    Values used to calculate the score:      Age: 59 years      Sex: Male      Is Non- : No      Diabetic: No      Tobacco smoker: No      Systolic Blood Pressure: 135 mmHg      Is BP treated: No      HDL Cholesterol: 32 mg/dL      Total Cholesterol: 271 mg/dL    Hyperlipidemia out of control patient stopped taking the medication but is willing to restart it today    Also has some skin rash on the left eye eyebrows and in the beard area that has been present for a few weeks  Pert ROS:   Constitutional: no recent illness, no fevers/sweats/chills  Eyes: No eye irritation  Lymph: no swollen nodes    Past Hx of : Nothing similar but does have a history of actinic keratoses and skin cancer and will be following up with a dermatologist for those    Pert exam:  Eye Exam: Normal external eye, conjunctiva, lids.  OroPharynx Exam: Dental hygiene adequate. Normal buccal mucosa. Normal pharynx.  Neck Exam: Supple, no masses or enlarged, tender nodes.  Cardiovascular Exam: No edema or vascular insufficiency.  Skin: Flaky skin around the left eye associated with the eyebrow and some lichenification as well as some redness and flaking in the beard area  Plan: Consistent with seborrheic dermatitis starting ketoconazole external cream and recommending follow-up with dermatology if this does  not get better            Today's PHQ-2 Score:   PHQ-2 ( 1999 Pf[employer]) 8/20/2019   Q1: Little interest or pleasure in doing things 0   Q2: Feeling down, depressed or hopeless 0   PHQ-2 Score 0   Q1: Little interest or pleasure in doing things Not at all   Q2: Feeling down, depressed or hopeless Not at all   PHQ-2 Score 0       Abuse: Current or Past(Physical, Sexual or Emotional)- No  Do you feel safe in your environment? Yes    Social History     Tobacco Use     Smoking status: Never Smoker     Smokeless tobacco: Never Used   Substance Use Topics     Alcohol use: Yes     Comment: 1 drink per week     If you drink alcohol do you typically have >3 drinks per day or >7 drinks per week? No    Alcohol Use 8/20/2019   Prescreen: >3 drinks/day or >7 drinks/week? No   Prescreen: >3 drinks/day or >7 drinks/week? -       Last PSA:   PSA   Date Value Ref Range Status   02/04/2010 1.39 ng/mL        Reviewed orders with patient. Reviewed health maintenance and updated orders accordingly - Yes  Lab work is in process  Labs reviewed in EPIC  BP Readings from Last 3 Encounters:   08/20/19 135/88   09/25/18 140/81   05/14/18 147/88    Wt Readings from Last 3 Encounters:   08/20/19 113.1 kg (249 lb 6.4 oz)   03/07/18 113.9 kg (251 lb)   02/08/18 114.2 kg (251 lb 12.8 oz)                  Patient Active Problem List   Diagnosis     ED (erectile dysfunction)     Hyperlipidemia LDL goal <160     Fatty liver     Insomnia     Obesity     Hypertension goal BP (blood pressure) < 140/90     Prediabetes     History of basal cell carcinoma     Scar     Past Surgical History:   Procedure Laterality Date     MOHS MICROGRAPHIC PROCEDURE  2009       Social History     Tobacco Use     Smoking status: Never Smoker     Smokeless tobacco: Never Used   Substance Use Topics     Alcohol use: Yes     Comment: 1 drink per week     Family History   Problem Relation Age of Onset     Diabetes Mother      Arthritis Mother      Gynecology Mother      Pancreatic  Cancer Mother      Diabetes Father      Hypertension Father      Alcohol/Drug Father      Circulatory Father      Depression Father      Heart Disease Father      Lipids Father      Cerebrovascular Disease Father          age 78     Diabetes Maternal Grandmother      Cerebrovascular Disease Maternal Grandfather      Prostate Cancer Maternal Grandfather      Prostate Cancer Paternal Grandfather      Depression Paternal Grandmother      Depression Sister          Current Outpatient Medications   Medication Sig Dispense Refill     diphenhydrAMINE (BENADRYL) 25 MG tablet Take 25 mg by mouth At Bedtime        simvastatin (ZOCOR) 40 MG tablet Take 1 tablet (40 mg) by mouth At Bedtime (Patient not taking: Reported on 2019) 90 tablet 3     Allergies   Allergen Reactions     Seasonal Allergies        Reviewed and updated as needed this visit by clinical staff  Tobacco  Allergies  Meds  Problems  Med Hx  Surg Hx  Fam Hx         Reviewed and updated as needed this visit by Provider            Review of Systems  CONSTITUTIONAL: NEGATIVE for fever, chills, change in weight  INTEGUMENTARY/SKIN: NEGATIVE for worrisome rashes, moles or lesions  EYES: NEGATIVE for vision changes or irritation  ENT: NEGATIVE for ear, mouth and throat problems  RESP: NEGATIVE for significant cough or SOB  CV: NEGATIVE for chest pain, palpitations or peripheral edema  GI: NEGATIVE for nausea, abdominal pain, heartburn, or change in bowel habits   male: negative for dysuria, hematuria, decreased urinary stream, erectile dysfunction, urethral discharge  MUSCULOSKELETAL: NEGATIVE for significant arthralgias or myalgia  NEURO: NEGATIVE for weakness, dizziness or paresthesias  PSYCHIATRIC: NEGATIVE for changes in mood or affect    OBJECTIVE:   /88   Pulse 92   Temp 98.5  F (36.9  C) (Oral)   Ht 1.829 m (6')   Wt 113.1 kg (249 lb 6.4 oz)   SpO2 96%   BMI 33.82 kg/m      Physical Exam    General Appearance: Pleasant, alert, in no  acute respiratory distress.  Head Exam: Normal. Normocephalic, atraumatic. No sinus tenderness.  Eye Exam: Normal external eye, conjunctiva, lids. HARSHAD.  Ear Exam: Normal auditory canals and external ears. Non-tender.  Left TM-normal. Right TM-normal.  OroPharynx Exam: Dental hygiene adequate. Normal buccal mucosa. Normal pharynx.  Neck Exam: Supple, no masses or enlarged, tender nodes.  Thyroid Exam: No nodules or enlargement or tenderness.  Chest/Respiratory Exam: Normal, comfortable, easy respirations. Chest wall normal. Lungs are clear to auscultation. No wheezing, crackles, or rhonchi.  Cardiovascular Exam: Regular rate and rhythm. No murmur, gallop, or rubs. No pedal edema.  Gastrointestinal Exam: Soft, non-tender, no masses or organomegaly.  Musculoskeletal Exam: Back is non-tender, full ROM of upper and lower extremities.  Skin: no rash, warm and dry.    Neurologic Exam: Nonfocal, no tremor. Normal gait.  Psychiatric Exam: Alert - appropriate, normal affect    ASSESSMENT/PLAN:       ICD-10-CM    1. Routine general medical examination at a health care facility Z00.00    2. Hyperlipidemia LDL goal <160 E78.5    3. Seborrheic dermatitis L21.9        COUNSELING:   Reviewed preventive health counseling, as reflected in patient instructions       Regular exercise       Healthy diet/nutrition    Estimated body mass index is 33.82 kg/m  as calculated from the following:    Height as of this encounter: 1.829 m (6').    Weight as of this encounter: 113.1 kg (249 lb 6.4 oz).     Weight management plan: Discussed healthy diet and exercise guidelines     reports that he has never smoked. He has never used smokeless tobacco.      Counseling Resources:  ATP IV Guidelines  Pooled Cohorts Equation Calculator  FRAX Risk Assessment  ICSI Preventive Guidelines  Dietary Guidelines for Americans, 2010  USDA's MyPlate  ASA Prophylaxis  Lung CA Screening    Paulino Adrián Roberto MD  [facility]

## 2019-09-23 ENCOUNTER — OFFICE VISIT (OUTPATIENT)
Dept: FAMILY MEDICINE | Facility: CLINIC | Age: 59
End: 2019-09-23
Payer: COMMERCIAL

## 2019-09-23 VITALS — DIASTOLIC BLOOD PRESSURE: 80 MMHG | SYSTOLIC BLOOD PRESSURE: 122 MMHG

## 2019-09-23 DIAGNOSIS — Z85.828 HISTORY OF BASAL CELL CARCINOMA: ICD-10-CM

## 2019-09-23 DIAGNOSIS — D22.5 MELANOCYTIC NEVI OF TRUNK: ICD-10-CM

## 2019-09-23 DIAGNOSIS — L57.0 AK (ACTINIC KERATOSIS): ICD-10-CM

## 2019-09-23 DIAGNOSIS — Z86.018 HISTORY OF DYSPLASTIC NEVUS: ICD-10-CM

## 2019-09-23 DIAGNOSIS — L24.9 IRRITANT DERMATITIS: ICD-10-CM

## 2019-09-23 DIAGNOSIS — L28.0 LICHENIFIED RASH: ICD-10-CM

## 2019-09-23 DIAGNOSIS — L73.9 FOLLICULITIS: ICD-10-CM

## 2019-09-23 DIAGNOSIS — Z12.83 SKIN CANCER SCREENING: Primary | ICD-10-CM

## 2019-09-23 PROCEDURE — 17003 DESTRUCT PREMALG LES 2-14: CPT | Performed by: FAMILY MEDICINE

## 2019-09-23 PROCEDURE — 17000 DESTRUCT PREMALG LESION: CPT | Performed by: FAMILY MEDICINE

## 2019-09-23 PROCEDURE — 99214 OFFICE O/P EST MOD 30 MIN: CPT | Mod: 25 | Performed by: FAMILY MEDICINE

## 2019-09-23 RX ORDER — DESONIDE 0.5 MG/G
OINTMENT TOPICAL 2 TIMES DAILY
Qty: 60 G | Refills: 0 | Status: SHIPPED | OUTPATIENT
Start: 2019-09-23 | End: 2020-09-29

## 2019-09-23 RX ORDER — DOXYCYCLINE 100 MG/1
100 CAPSULE ORAL 2 TIMES DAILY
Qty: 28 CAPSULE | Refills: 0 | Status: SHIPPED | OUTPATIENT
Start: 2019-09-23 | End: 2020-01-10

## 2019-09-23 NOTE — LETTER
9/23/2019         RE: Elliot Norris  [address]  [place] [address]        Dear Colleague,    Thank you for referring your patient, Elliot Norris, to the [facility]. Please see a copy of my visit note below.    [facility] - PRIMARY CARE SKIN    CC : skin cancer screening (full-body)  SUBJECTIVE:                                                    Elliot Norris is a 59 year old male who presents to clinic today for a full-body skin cancer screening because of his history of skin cancer.    Bothersome lesions noticed by the patient or other skin concerns :  Issue One : Itchiness of the left eyelids, neck, and beard area have been persistent since the beginning of summer approximately 2-3 months ago. Left eye weeping has also been noticed. He has not used any new products, though he switched to a coconut paraben-free product and an unscented shampoo and conditioner. Treatment has included ketoconazole 2% cream at least twice daily, but it provoked burning sensations in areas of itchiness. He is now using the ketoconazole 2% cream every other day. He has also been using hydrocortisone (twice daily for approximately 5 days), witch hazel, and aloe vera gel. He has not applied anything in the last two days.  Issue Two: Elliot has noticed new spots on the neck.  Issue Three: Overall he has noticed less actinic keratoses developing with regular cryotherapy.    Personal history of skin cancer : YES - basal cell carcinoma, actinic keratoses, mild dysplastic nevus(i) on the left abdomen and back midline at T4  Family history of skin cancer : YES - in father.     Sun Exposure History  Previous history of significant sun exposure:  Blistering sunburns : NO  Tanning beds : NO.  Sunscreen Use : YES.  UV-protective clothing use : NO  Wide-brimmed hats : NO - He has been applying sunscreen on the scalp but irregularly.  UV-protective sunglasses : NO  Avoids mid-day  sun : YES     Occupation : self-employed,  (indoor).    Refer to electronic medical record (EMR) for past medical history and medications.    INTEGUMENTARY/SKIN: POSITIVE for rash  ROS : 14 point review of systems was negative except the symptoms listed above in the HPI.    This document serves as a record of the services and decisions personally performed and made by Parisa Madrigal MD and was created by Gibson Davidson, a trained medical scribe, based on personal observations and provider statements to the medical scribe.  September 23, 2019 7:31 AM   Gibson Davidson     OBJECTIVE:                                                    GENERAL: healthy, alert and no distress  SKIN: Interiano Skin Type - I.  This patient was examined from the top of the head to the bottom of the feet  including scalp, face, neck, trunk, buttocks, both arms, both legs, both hands, both feet, and all fingers and toes. The dermatoscope was used to help evaluate pigmented lesions.  Skin Pertinent Findings:  Mid chest: Raised lesion most consistent with a compound nevus.    Anterior trunk: Multiple brown macules of various sizes and shapes most consistent with (benign) melanocytic nevi.    Significant Findings:  Neck: Few scattered inflammatory papules.    Right base of neck: Scattered patches of perifollicular erythema and extending across the upper chest    Left eyelids: lichenification, light scaling, and erythema    Scalp: 3-4 mm in size, erythematous, scaly, non-indurated lesion(s) most consistent with actinic keratoses.  Name: Liquid Nitrogen Cry-Ac Cryotherapy.  Indication: Pre-malignant lesion.  Location(s): scalp - x8.  Completed by: Julia Madrigal MD.  Note : Discussed natural history of lesion and treatment options. Prior to treatment, we discussed inflammation, tenderness post-procedure, the healing process, and the risks of pain, infection, scarring, blistering, and hypo-/hyperpigmentation after healing. Explained that these  lesions may grow back and may need additional treatment or re-treatment. The patient expressed a desire to proceed with cryotherapy.    The lesion(s) was treated with liquid nitrogen Cry-Ac, five second freeze repeated twice with a pause to allow for the area to thaw.    Patient tolerated the procedure well and left in good condition. If this lesion should persist or recur, then it needs to be re-evaluated.  Total number of lesions treated: 8.    Diagnostic Test Results:      ASSESSMENT:                                                      Encounter Diagnoses   Name Primary?     Skin cancer screening Yes     History of basal cell carcinoma      History of dysplastic nevus      AK (actinic keratosis)      Folliculitis      Lichenified rash      Melanocytic nevi of trunk          PLAN:                                                    Patient Instructions   FUTURE APPOINTMENTS  Follow up in 1 year(s) for a full-body skin cancer screening.  Follow up as needed if the rash is not resolving in the next 2 weeks.    ORAL ANTIBIOTIC  Take by mouth 1 capsule/tablet of doxycycline 100 mg two time(s) a day for 2 weeks. Make sure to complete the entire antibiotic regimen as instructed to prevent development of bacterial resistance to antibiotics.    TETRACYCLINE ANTIBIOTIC INFORMATION  Minocycline and doxycycline are tetracycline antibiotics and can increase your sun sensitivity, so make sure to be vigilant in regularly applying sunscreen. Also, avoid taking these with dairy products, as calcium can bind the antibiotic, making it unavailable to your body.    TOPICAL STEROID INSTRUCTIONS  Desonide 0.05% ointment.  Apply two times per day for 5-7 days. Then, use only when needed.  1. Wash hands before applying topical steroid.  2. Apply sparingly (just enough to rub in) onto affected areas of the left eyelids and neck.    This is a weak strength steroid, and it can be used on thinner skin areas such as the face.    Keep in mind  to also regularly use moisturizer, as this preventative measure can help maintain your skin's natural protective moisture barrier.    ACTINIC KERATOSES POST-TREATMENT CARE INSTRUCTIONS  Actinic keratoses are benign, scaly or gritty lesions that appear in sun-exposed areas and may progress to skin cancers. For this reason, it is important to treat them before they become cancerous. Liquid nitrogen is the most commonly used and most effective treatment for actinic keratoses; it is mildly uncomfortable when applied to the skin, but the discomfort rapidly subsides.    Post-Treatment:  You may experience burning and/or stinging immediately following the procedure. The discomfort from the procedure may persist over the next 12-24 hours. The area treated will look pinker and slightly swollen before the healing process begins. You may also notice redness, swelling, tenderness, weeping and crusts or scabs. Healing time is approximately 10-14 days.    Blister - You may or may notice blistering from the freezing. If you develop an uncomfortable blister from today's treatment, you may gently puncture this with a needle that has been cleaned with alcohol. However, do not remove the protective skin layer of the blister.    Scab - After a few days, you may notice scaliness or scab formation. Do not pick at the scabs because this may cause slower healing and a permanent scar.    The skin may appear temporarily darker at the treatment site, but this usually fades over a period of months, provided that the area is protected from the sun.    Care of the areas treated:    Wash the area with a mild cleanser.    Gently pat dry.    Do not rub.     Keep protected from the sun during the healing process and for a full year following treatment as the skin continues to remodel during this time.    Apply Vaseline or Aquaphor ointment sparingly to the site for the first 7 days after treatment.    Do not use Neosporin, as many people eventually  "develop a medication allergy, that can easily be confused with an infection, to Neomycin.    Return if:  There should not be any residual scaling. If there is any concern that the lesion has persisted after 4-6 weeks, make an appointment for a re-check. Healing time does vary depending on your individual healing process and the area of the body treated. Most patients will be healed in one month.    Signs of Infection:  Thankfully this is rare. However if you notice persistent colored drainage, increasing pain, fever or other signs of infection, please call us at: [phone]    SUN PROTECTION INSTRUCTIONS  Sun damage can lead to skin cancer and premature aging of the skin.      The best way to protect from sun damage to your skin is to avoid the sun during peak hours (10 am - 2 pm) even on overcast days.    Never use tanning beds. Tanning beds are associated with much higher risks of skin cancer.    All tanning damages the skin. Aim for ivory skin year round and you will have less trouble with your skin in years to come. There is no merit in getting \"a base tan\" before a warm weather vacation, as any tanning indicates your body's response to sun damage.    Stop smoking. Smokers have higher rates of skin cancer and also have premature skin wrinkling.    Use UPF sun-protective clothing, which while more expensive initially provides longer lasting coverage without having to worry about remembering to re-apply.  1. Wear a wide-brimmed hat and sunglasses.   2. Wear sun-protective clothing.  [employer] other [employer] make sun protective clothing that are stylish, comfortable and cool.   [employer] and other [employer] make UV arm sleeves suitable for golfing, gardening and other activities.    Sunscreen instructions:  1. Use sunscreens with Zinc Oxide, Titanium Dioxide or Avobenzone to protect from UVA rays.  2. Use SPF 30-50+ to protect from UVB rays.  3. Re-apply every 2 hours even if water " "resistant.  4. Apply on your face every day even when cloudy and even in the winter. UVA \"aging rays\" penetrate window glass and are just as strong in the winter as in the summer.    FYI  You should use about 3 tablespoons of sunscreen to protect your whole body. Thus a typical eight ounce bottle of sunscreen should last 4 applications. Remember, that the SPF rating is compromised if you don t apply enough. Most people only apply 1/2 - 1/3 of the amount they need. Also don t forget areas such as your ears, feet, upper back and harder to reach places. Keep in mind that these amounts should be increased for larger body sizes.    Sunscreens with titanium dioxide and/or zinc oxide in the active ingredients are physical blockers as opposed to chemical blockers. Chemical-free sunscreens should not irritate the skin.    Spray-on sunscreens may be used for touch-up application only, not as a base layer. Also, use with caution around small children due to inhalation risk.    SPF means sun protection factor, which is just the degree to which the sunscreen can protect against UVB rays. There is no rating system for UVA rays. SPF is calculated as the time skin will burn when sunscreen is applied vs. skin without sunscreen.    Water resistant sunscreens should be re-applied every 1-2 hours.    Product Recommendations:    Consider use of sunscreen sticks with Zinc Oxide and Titanium Dioxide active ingredients such as Neutrogena Pure&Free Baby Sunscreen Stick.    Good examples include: Blue Lizard, EltaMD, Solbar    Good daily moisturizers with SPF: Vanicream, CeraVe.    For sensitive skin, consider : SkinMedica Essential Defense Mineral Shield Broad Spectrum SPF 35    Men: consider use of Neutrogena Triple Protect Facial Lotion    Avoid retinyl palmitate products.  Avoid combination products that include both sunscreen and insect repellant, as sunscreen should be applied every 2 hours, but insect repellant should not be applied " as frequently.    For more information:  https://www.skincancer.org/prevention/sun-protection/sunscreen/sunscreens-safe-and-effective    SKIN CANCER SELF-EXAM INSTRUCTIONS  Check every month in the mirror or with a household member. Be aware of any changes, especially bleeding or tenderness. Also, make sure to check your nails for color changes after removal of nail polish.    For melanoma, check for:  A - Asymmetry. One half unlike the other half.  B - Border. Irregular, scalloped, ragged, notched, blurred or poorly defined borders.  C - Color. Color variations from one area to another, with shades of tan, brown and/or black present. Sometimes white, red or blue.  D - Diameter. Greater than 6 mm (about the size of a pencil eraser). Any new growth of a mole should be concerning and be evaluated.  E - Evolving. A mole or skin lesion that looks different from the rest or is changing in size, shape or color.    For basal cell carcinoma and squamous cell carcinoma, check for:    Sores, shiny bumps, nodules, scaly lesions, or wart-like growths that are itchy, tender, crusting, scabbing, eroding, oozing or bleeding.    Open sores/wounds or reddish/irritated areas that do not heal within 2-3 weeks.    Scar-like areas that are white, yellow or waxy in color.      The patient was counseled about sunscreens and sun avoidance. The patient was counseled to check the skin regularly and report any lesion that is new, changing, itching, scabbing, bleeding or otherwise bothersome. The patient was discharged ambulatory and in stable condition.    TT : 25 minutes.  CT : 15 minutes.    The information in this document, created by the medical scribe for me, accurately reflects the services I personally performed and the decisions made by me. I have reviewed and approved this document for accuracy prior to leaving the patient care area.  September 23, 2019 7:35 AM  Parisa Madrigal MD  [facility]    Again, thank you  for allowing me to participate in the care of your patient.        Sincerely,        Parisa Madrigal MD

## 2019-09-23 NOTE — PROGRESS NOTES
[facility] - PRIMARY CARE SKIN    CC : skin cancer screening (full-body)  SUBJECTIVE:                                                    Elliot Norris is a 59 year old male who presents to clinic today for a full-body skin cancer screening because of his history of skin cancer.    Bothersome lesions noticed by the patient or other skin concerns :  Issue One : Itchiness of the left eyelids, neck, and beard area have been persistent since the beginning of summer approximately 2-3 months ago. Left eye weeping has also been noticed. He has not used any new products, though he switched to a coconut paraben-free product and an unscented shampoo and conditioner. Treatment has included ketoconazole 2% cream at least twice daily, but it provoked burning sensations in areas of itchiness. He is now using the ketoconazole 2% cream every other day. He has also been using hydrocortisone (twice daily for approximately 5 days), witch hazel, and aloe vera gel. He has not applied anything in the last two days.  Issue Two: Elliot has noticed new spots on the neck.  Issue Three: Overall he has noticed less actinic keratoses developing with regular cryotherapy.    Personal history of skin cancer : YES - basal cell carcinoma, actinic keratoses, mild dysplastic nevus(i) on the left abdomen and back midline at T4  Family history of skin cancer : YES - in father.     Sun Exposure History  Previous history of significant sun exposure:  Blistering sunburns : NO  Tanning beds : NO.  Sunscreen Use : YES.  UV-protective clothing use : NO  Wide-brimmed hats : NO - He has been applying sunscreen on the scalp but irregularly.  UV-protective sunglasses : NO  Avoids mid-day sun : YES     Occupation : self-employed,  (indoor).    Refer to electronic medical record (EMR) for past medical history and medications.    INTEGUMENTARY/SKIN: POSITIVE for rash  ROS : 14 point review of systems was negative except the symptoms listed  above in the HPI.    This document serves as a record of the services and decisions personally performed and made by Parisa Madrigal MD and was created by Gibson Davidson, a trained medical scribe, based on personal observations and provider statements to the medical scribe.  September 23, 2019 7:31 AM   Gibson Davidson     OBJECTIVE:                                                    GENERAL: healthy, alert and no distress  SKIN: Interiano Skin Type - I.  This patient was examined from the top of the head to the bottom of the feet  including scalp, face, neck, trunk, buttocks, both arms, both legs, both hands, both feet, and all fingers and toes. The dermatoscope was used to help evaluate pigmented lesions.  Skin Pertinent Findings:  Mid chest: Raised lesion most consistent with a compound nevus.    Anterior trunk: Multiple brown macules of various sizes and shapes most consistent with (benign) melanocytic nevi.    Significant Findings:  Neck: Few scattered inflammatory papules.    Right base of neck: Scattered patches of perifollicular erythema and extending across the upper chest    Left eyelids: lichenification, light scaling, and erythema    Scalp: 3-4 mm in size, erythematous, scaly, non-indurated lesion(s) most consistent with actinic keratoses.  Name: Liquid Nitrogen Cry-Ac Cryotherapy.  Indication: Pre-malignant lesion.  Location(s): scalp - x8.  Completed by: Julia Madrigal MD.  Note : Discussed natural history of lesion and treatment options. Prior to treatment, we discussed inflammation, tenderness post-procedure, the healing process, and the risks of pain, infection, scarring, blistering, and hypo-/hyperpigmentation after healing. Explained that these lesions may grow back and may need additional treatment or re-treatment. The patient expressed a desire to proceed with cryotherapy.    The lesion(s) was treated with liquid nitrogen Cry-Ac, five second freeze repeated twice with a pause to allow for the area to  thaw.    Patient tolerated the procedure well and left in good condition. If this lesion should persist or recur, then it needs to be re-evaluated.  Total number of lesions treated: 8.    Diagnostic Test Results:      ASSESSMENT:                                                      Encounter Diagnoses   Name Primary?     Skin cancer screening Yes     History of basal cell carcinoma      History of dysplastic nevus      AK (actinic keratosis)      Folliculitis      Lichenified rash      Melanocytic nevi of trunk          PLAN:                                                    Patient Instructions   FUTURE APPOINTMENTS  Follow up in 1 year(s) for a full-body skin cancer screening.  Follow up as needed if the rash is not resolving in the next 2 weeks.    ORAL ANTIBIOTIC  Take by mouth 1 capsule/tablet of doxycycline 100 mg two time(s) a day for 2 weeks. Make sure to complete the entire antibiotic regimen as instructed to prevent development of bacterial resistance to antibiotics.    TETRACYCLINE ANTIBIOTIC INFORMATION  Minocycline and doxycycline are tetracycline antibiotics and can increase your sun sensitivity, so make sure to be vigilant in regularly applying sunscreen. Also, avoid taking these with dairy products, as calcium can bind the antibiotic, making it unavailable to your body.    TOPICAL STEROID INSTRUCTIONS  Desonide 0.05% ointment.  Apply two times per day for 5-7 days. Then, use only when needed.  1. Wash hands before applying topical steroid.  2. Apply sparingly (just enough to rub in) onto affected areas of the left eyelids and neck.    This is a weak strength steroid, and it can be used on thinner skin areas such as the face.    Keep in mind to also regularly use moisturizer, as this preventative measure can help maintain your skin's natural protective moisture barrier.    ACTINIC KERATOSES POST-TREATMENT CARE INSTRUCTIONS  Actinic keratoses are benign, scaly or gritty lesions that appear in  sun-exposed areas and may progress to skin cancers. For this reason, it is important to treat them before they become cancerous. Liquid nitrogen is the most commonly used and most effective treatment for actinic keratoses; it is mildly uncomfortable when applied to the skin, but the discomfort rapidly subsides.    Post-Treatment:  You may experience burning and/or stinging immediately following the procedure. The discomfort from the procedure may persist over the next 12-24 hours. The area treated will look pinker and slightly swollen before the healing process begins. You may also notice redness, swelling, tenderness, weeping and crusts or scabs. Healing time is approximately 10-14 days.    Blister - You may or may notice blistering from the freezing. If you develop an uncomfortable blister from today's treatment, you may gently puncture this with a needle that has been cleaned with alcohol. However, do not remove the protective skin layer of the blister.    Scab - After a few days, you may notice scaliness or scab formation. Do not pick at the scabs because this may cause slower healing and a permanent scar.    The skin may appear temporarily darker at the treatment site, but this usually fades over a period of months, provided that the area is protected from the sun.    Care of the areas treated:    Wash the area with a mild cleanser.    Gently pat dry.    Do not rub.     Keep protected from the sun during the healing process and for a full year following treatment as the skin continues to remodel during this time.    Apply Vaseline or Aquaphor ointment sparingly to the site for the first 7 days after treatment.    Do not use Neosporin, as many people eventually develop a medication allergy, that can easily be confused with an infection, to Neomycin.    Return if:  There should not be any residual scaling. If there is any concern that the lesion has persisted after 4-6 weeks, make an appointment for a re-check.  "Healing time does vary depending on your individual healing process and the area of the body treated. Most patients will be healed in one month.    Signs of Infection:  Thankfully this is rare. However if you notice persistent colored drainage, increasing pain, fever or other signs of infection, please call us at: [phone]    SUN PROTECTION INSTRUCTIONS  Sun damage can lead to skin cancer and premature aging of the skin.      The best way to protect from sun damage to your skin is to avoid the sun during peak hours (10 am - 2 pm) even on overcast days.    Never use tanning beds. Tanning beds are associated with much higher risks of skin cancer.    All tanning damages the skin. Aim for ivory skin year round and you will have less trouble with your skin in years to come. There is no merit in getting \"a base tan\" before a warm weather vacation, as any tanning indicates your body's response to sun damage.    Stop smoking. Smokers have higher rates of skin cancer and also have premature skin wrinkling.    Use UPF sun-protective clothing, which while more expensive initially provides longer lasting coverage without having to worry about remembering to re-apply.  1. Wear a wide-brimmed hat and sunglasses.   2. Wear sun-protective clothing.  [employer] and other [employer] make sun protective clothing that are stylish, comfortable and cool.   [employer] and other [employer] make UV arm sleeves suitable for golfing, gardening and other activities.    Sunscreen instructions:  1. Use sunscreens with Zinc Oxide, Titanium Dioxide or Avobenzone to protect from UVA rays.  2. Use SPF 30-50+ to protect from UVB rays.  3. Re-apply every 2 hours even if water resistant.  4. Apply on your face every day even when cloudy and even in the winter. UVA \"aging rays\" penetrate window glass and are just as strong in the winter as in the summer.    FYI  You should use about 3 tablespoons of sunscreen to protect your whole body. " Thus a typical eight ounce bottle of sunscreen should last 4 applications. Remember, that the SPF rating is compromised if you don t apply enough. Most people only apply 1/2 - 1/3 of the amount they need. Also don t forget areas such as your ears, feet, upper back and harder to reach places. Keep in mind that these amounts should be increased for larger body sizes.    Sunscreens with titanium dioxide and/or zinc oxide in the active ingredients are physical blockers as opposed to chemical blockers. Chemical-free sunscreens should not irritate the skin.    Spray-on sunscreens may be used for touch-up application only, not as a base layer. Also, use with caution around small children due to inhalation risk.    SPF means sun protection factor, which is just the degree to which the sunscreen can protect against UVB rays. There is no rating system for UVA rays. SPF is calculated as the time skin will burn when sunscreen is applied vs. skin without sunscreen.    Water resistant sunscreens should be re-applied every 1-2 hours.    Product Recommendations:    Consider use of sunscreen sticks with Zinc Oxide and Titanium Dioxide active ingredients such as Neutrogena Pure&Free Baby Sunscreen Stick.    Good examples include: Blue Lizard, EltaMD, Solbar    Good daily moisturizers with SPF: Vanicream, CeraVe.    For sensitive skin, consider : SkinMedica Essential Defense Mineral Shield Broad Spectrum SPF 35    Men: consider use of Neutrogena Triple Protect Facial Lotion    Avoid retinyl palmitate products.  Avoid combination products that include both sunscreen and insect repellant, as sunscreen should be applied every 2 hours, but insect repellant should not be applied as frequently.    For more information:  https://www.skincancer.org/prevention/sun-protection/sunscreen/sunscreens-safe-and-effective    SKIN CANCER SELF-EXAM INSTRUCTIONS  Check every month in the mirror or with a household member. Be aware of any changes,  especially bleeding or tenderness. Also, make sure to check your nails for color changes after removal of nail polish.    For melanoma, check for:  A - Asymmetry. One half unlike the other half.  B - Border. Irregular, scalloped, ragged, notched, blurred or poorly defined borders.  C - Color. Color variations from one area to another, with shades of tan, brown and/or black present. Sometimes white, red or blue.  D - Diameter. Greater than 6 mm (about the size of a pencil eraser). Any new growth of a mole should be concerning and be evaluated.  E - Evolving. A mole or skin lesion that looks different from the rest or is changing in size, shape or color.    For basal cell carcinoma and squamous cell carcinoma, check for:    Sores, shiny bumps, nodules, scaly lesions, or wart-like growths that are itchy, tender, crusting, scabbing, eroding, oozing or bleeding.    Open sores/wounds or reddish/irritated areas that do not heal within 2-3 weeks.    Scar-like areas that are white, yellow or waxy in color.      The patient was counseled about sunscreens and sun avoidance. The patient was counseled to check the skin regularly and report any lesion that is new, changing, itching, scabbing, bleeding or otherwise bothersome. The patient was discharged ambulatory and in stable condition.    TT : 25 minutes.  CT : 15 minutes.    The information in this document, created by the medical scribe for me, accurately reflects the services I personally performed and the decisions made by me. I have reviewed and approved this document for accuracy prior to leaving the patient care area.  September 23, 2019 7:35 AM  Parisa Madrigal MD  [facility] [place]

## 2019-09-23 NOTE — PATIENT INSTRUCTIONS
FUTURE APPOINTMENTS  Follow up in 1 year(s) for a full-body skin cancer screening.  Follow up as needed if the rash is not resolving in the next 2 weeks.    ORAL ANTIBIOTIC  Take by mouth 1 capsule/tablet of doxycycline 100 mg two time(s) a day for 2 weeks. Make sure to complete the entire antibiotic regimen as instructed to prevent development of bacterial resistance to antibiotics.    TETRACYCLINE ANTIBIOTIC INFORMATION  Minocycline and doxycycline are tetracycline antibiotics and can increase your sun sensitivity, so make sure to be vigilant in regularly applying sunscreen. Also, avoid taking these with dairy products, as calcium can bind the antibiotic, making it unavailable to your body.    TOPICAL STEROID INSTRUCTIONS  Desonide 0.05% ointment.  Apply two times per day for 5-7 days. Then, use only when needed.  1. Wash hands before applying topical steroid.  2. Apply sparingly (just enough to rub in) onto affected areas of the left eyelids and neck.    This is a weak strength steroid, and it can be used on thinner skin areas such as the face.    Keep in mind to also regularly use moisturizer, as this preventative measure can help maintain your skin's natural protective moisture barrier.    ACTINIC KERATOSES POST-TREATMENT CARE INSTRUCTIONS  Actinic keratoses are benign, scaly or gritty lesions that appear in sun-exposed areas and may progress to skin cancers. For this reason, it is important to treat them before they become cancerous. Liquid nitrogen is the most commonly used and most effective treatment for actinic keratoses; it is mildly uncomfortable when applied to the skin, but the discomfort rapidly subsides.    Post-Treatment:  You may experience burning and/or stinging immediately following the procedure. The discomfort from the procedure may persist over the next 12-24 hours. The area treated will look pinker and slightly swollen before the healing process begins. You may also notice redness, swelling,  tenderness, weeping and crusts or scabs. Healing time is approximately 10-14 days.    Blister - You may or may notice blistering from the freezing. If you develop an uncomfortable blister from today's treatment, you may gently puncture this with a needle that has been cleaned with alcohol. However, do not remove the protective skin layer of the blister.    Scab - After a few days, you may notice scaliness or scab formation. Do not pick at the scabs because this may cause slower healing and a permanent scar.    The skin may appear temporarily darker at the treatment site, but this usually fades over a period of months, provided that the area is protected from the sun.    Care of the areas treated:    Wash the area with a mild cleanser.    Gently pat dry.    Do not rub.     Keep protected from the sun during the healing process and for a full year following treatment as the skin continues to remodel during this time.    Apply Vaseline or Aquaphor ointment sparingly to the site for the first 7 days after treatment.    Do not use Neosporin, as many people eventually develop a medication allergy, that can easily be confused with an infection, to Neomycin.    Return if:  There should not be any residual scaling. If there is any concern that the lesion has persisted after 4-6 weeks, make an appointment for a re-check. Healing time does vary depending on your individual healing process and the area of the body treated. Most patients will be healed in one month.    Signs of Infection:  Thankfully this is rare. However if you notice persistent colored drainage, increasing pain, fever or other signs of infection, please call us at: [phone]    SUN PROTECTION INSTRUCTIONS  Sun damage can lead to skin cancer and premature aging of the skin.      The best way to protect from sun damage to your skin is to avoid the sun during peak hours (10 am - 2 pm) even on overcast days.    Never use tanning beds. Tanning beds are  "associated with much higher risks of skin cancer.    All tanning damages the skin. Aim for ivory skin year round and you will have less trouble with your skin in years to come. There is no merit in getting \"a base tan\" before a warm weather vacation, as any tanning indicates your body's response to sun damage.    Stop smoking. Smokers have higher rates of skin cancer and also have premature skin wrinkling.    Use UPF sun-protective clothing, which while more expensive initially provides longer lasting coverage without having to worry about remembering to re-apply.  1. Wear a wide-brimmed hat and sunglasses.   2. Wear sun-protective clothing.  [employer] and other [employer] make sun protective clothing that are stylish, comfortable and cool.   [employer] and other [employer] make UV arm sleeves suitable for golfing, gardening and other activities.    Sunscreen instructions:  1. Use sunscreens with Zinc Oxide, Titanium Dioxide or Avobenzone to protect from UVA rays.  2. Use SPF 30-50+ to protect from UVB rays.  3. Re-apply every 2 hours even if water resistant.  4. Apply on your face every day even when cloudy and even in the winter. UVA \"aging rays\" penetrate window glass and are just as strong in the winter as in the summer.    FYI  You should use about 3 tablespoons of sunscreen to protect your whole body. Thus a typical eight ounce bottle of sunscreen should last 4 applications. Remember, that the SPF rating is compromised if you don t apply enough. Most people only apply 1/2 - 1/3 of the amount they need. Also don t forget areas such as your ears, feet, upper back and harder to reach places. Keep in mind that these amounts should be increased for larger body sizes.    Sunscreens with titanium dioxide and/or zinc oxide in the active ingredients are physical blockers as opposed to chemical blockers. Chemical-free sunscreens should not irritate the skin.    Spray-on sunscreens may be used for touch-up " application only, not as a base layer. Also, use with caution around small children due to inhalation risk.    SPF means sun protection factor, which is just the degree to which the sunscreen can protect against UVB rays. There is no rating system for UVA rays. SPF is calculated as the time skin will burn when sunscreen is applied vs. skin without sunscreen.    Water resistant sunscreens should be re-applied every 1-2 hours.    Product Recommendations:    Consider use of sunscreen sticks with Zinc Oxide and Titanium Dioxide active ingredients such as Neutrogena Pure&Free Baby Sunscreen Stick.    Good examples include: Blue Lizard, EltaMD, Solbar    Good daily moisturizers with SPF: Vanicream, CeraVe.    For sensitive skin, consider : SkinMedica Essential Defense Mineral Shield Broad Spectrum SPF 35    Men: consider use of Neutrogena Triple Protect Facial Lotion    Avoid retinyl palmitate products.  Avoid combination products that include both sunscreen and insect repellant, as sunscreen should be applied every 2 hours, but insect repellant should not be applied as frequently.    For more information:  https://www.skincancer.org/prevention/sun-protection/sunscreen/sunscreens-safe-and-effective    SKIN CANCER SELF-EXAM INSTRUCTIONS  Check every month in the mirror or with a household member. Be aware of any changes, especially bleeding or tenderness. Also, make sure to check your nails for color changes after removal of nail polish.    For melanoma, check for:  A - Asymmetry. One half unlike the other half.  B - Border. Irregular, scalloped, ragged, notched, blurred or poorly defined borders.  C - Color. Color variations from one area to another, with shades of tan, brown and/or black present. Sometimes white, red or blue.  D - Diameter. Greater than 6 mm (about the size of a pencil eraser). Any new growth of a mole should be concerning and be evaluated.  E - Evolving. A mole or skin lesion that looks different from  the rest or is changing in size, shape or color.    For basal cell carcinoma and squamous cell carcinoma, check for:    Sores, shiny bumps, nodules, scaly lesions, or wart-like growths that are itchy, tender, crusting, scabbing, eroding, oozing or bleeding.    Open sores/wounds or reddish/irritated areas that do not heal within 2-3 weeks.    Scar-like areas that are white, yellow or waxy in color.

## 2019-09-30 ENCOUNTER — TELEPHONE (OUTPATIENT)
Dept: FAMILY MEDICINE | Facility: CLINIC | Age: 59
End: 2019-09-30

## 2019-09-30 NOTE — TELEPHONE ENCOUNTER
KAYLIN for Dr. Madrigal  patient completed a survey regarding his clinic visit and administration felt we should call and check on patient status:  Called patient; states he is doing better- the rash/skin irritation is improving  Eye ball itself sometimes gets irritated- thinks this is due to the medications that he is using- thinks he may be getting the ointment in his eye  States if he keeps the ointment out of his eye- he is doing better -  The lash line gets itchy so he rubs his eye and gets the steroid ointment in his eye - using the ointment and trying to keep away from the eyelashes    Using visine with benadryl- this helps keep eyeball soothed

## 2020-01-10 ENCOUNTER — OFFICE VISIT (OUTPATIENT)
Dept: FAMILY MEDICINE | Facility: CLINIC | Age: 60
End: 2020-01-10
Payer: COMMERCIAL

## 2020-01-10 ENCOUNTER — NURSE TRIAGE (OUTPATIENT)
Dept: NURSING | Facility: CLINIC | Age: 60
End: 2020-01-10

## 2020-01-10 VITALS
WEIGHT: 260 LBS | DIASTOLIC BLOOD PRESSURE: 100 MMHG | SYSTOLIC BLOOD PRESSURE: 175 MMHG | OXYGEN SATURATION: 97 % | HEIGHT: 72 IN | HEART RATE: 86 BPM | TEMPERATURE: 98.5 F | BODY MASS INDEX: 35.21 KG/M2

## 2020-01-10 DIAGNOSIS — J20.9 ACUTE BRONCHITIS WITH SYMPTOMS > 10 DAYS: Primary | ICD-10-CM

## 2020-01-10 DIAGNOSIS — I10 HYPERTENSION GOAL BP (BLOOD PRESSURE) < 140/90: ICD-10-CM

## 2020-01-10 DIAGNOSIS — J98.01 ACUTE BRONCHOSPASM: ICD-10-CM

## 2020-01-10 PROCEDURE — 94640 AIRWAY INHALATION TREATMENT: CPT | Performed by: NURSE PRACTITIONER

## 2020-01-10 PROCEDURE — 99214 OFFICE O/P EST MOD 30 MIN: CPT | Mod: 25 | Performed by: NURSE PRACTITIONER

## 2020-01-10 RX ORDER — IPRATROPIUM BROMIDE AND ALBUTEROL SULFATE 2.5; .5 MG/3ML; MG/3ML
3 SOLUTION RESPIRATORY (INHALATION) ONCE
Status: COMPLETED | OUTPATIENT
Start: 2020-01-10 | End: 2020-01-10

## 2020-01-10 RX ORDER — PREDNISONE 20 MG/1
40 TABLET ORAL DAILY
Qty: 10 TABLET | Refills: 0 | Status: SHIPPED | OUTPATIENT
Start: 2020-01-10 | End: 2020-01-30

## 2020-01-10 RX ORDER — ALBUTEROL SULFATE 90 UG/1
2 AEROSOL, METERED RESPIRATORY (INHALATION) EVERY 4 HOURS PRN
Qty: 8.5 G | Refills: 1 | Status: SHIPPED | OUTPATIENT
Start: 2020-01-10 | End: 2020-09-21

## 2020-01-10 RX ORDER — DOXYCYCLINE 100 MG/1
100 TABLET ORAL DAILY
Qty: 7 TABLET | Refills: 0 | Status: SHIPPED | OUTPATIENT
Start: 2020-01-10 | End: 2020-01-30

## 2020-01-10 RX ORDER — DOXYCYCLINE 100 MG/1
100 CAPSULE ORAL 2 TIMES DAILY
Qty: 14 CAPSULE | Refills: 0 | Status: CANCELLED | OUTPATIENT
Start: 2020-01-10 | End: 2020-01-17

## 2020-01-10 RX ADMIN — IPRATROPIUM BROMIDE AND ALBUTEROL SULFATE 3 ML: 2.5; .5 SOLUTION RESPIRATORY (INHALATION) at 16:50

## 2020-01-10 ASSESSMENT — MIFFLIN-ST. JEOR: SCORE: 2032.35

## 2020-01-10 ASSESSMENT — PAIN SCALES - GENERAL: PAINLEVEL: NO PAIN (0)

## 2020-01-10 NOTE — PATIENT INSTRUCTIONS
Push fluids, rest  Start antibiotic today - doxycycline    Know that your cough may not be completely gone when you're done with your antibiotics  Start prednisone today - take when you get it and then in the mornings (steroid)  Albuterol inhaler for cough/wheeze   You can use a humidifier by your bed at night. Distilled water should be used with the humidifier.  Tylenol or ibuprofen as needed for pain or fever  Follow up if you are worsening or not improving in 1 week  Patient Education     Bronchitis, Antibiotic Treatment (Adult)    Bronchitis is an infection of the air passages (bronchial tubes) in your lungs. It often occurs when you have a cold. This illness is contagious during the first few days and is spread through the air by coughing and sneezing, or by direct contact (touching the sick person and then touching your own eyes, nose, or mouth).  Symptoms of bronchitis include cough with mucus (phlegm) and low-grade fever. Bronchitis usually lasts 7 to 14 days. Mild cases can be treated with simple home remedies. More severe infection is treated with an antibiotic.  Home care  Follow these guidelines when caring for yourself at home:    If your symptoms are severe, rest at home for the first 2 to 3 days. When you go back to your usual activities, don't let yourself get too tired.    Don't smoke. Also stay away from secondhand smoke.    You may use over-the-counter medicines to control fever or pain, unless another medicine was prescribed. If you have chronic liver or kidney disease or have ever had a stomach ulcer or gastrointestinal bleeding, talk with your healthcare provider before using these medicines. Also talk to your provider if you are taking medicine to prevent blood clots. Aspirin should never be given to anyone younger than 18 who is ill with a viral infection or fever. It may cause severe liver or brain damage.    Your appetite may be low, so a light diet is fine. Stay well hydrated by drinking 6  to 8 glasses of fluids per day. This includes water, soft drinks, sports drinks, juices, tea, or soup. Extra fluids will help loosen mucus in your nose and lungs.    Over-the-counter cough, cold, and sore-throat medicines will not shorten the length of the illness, but they may be helpful to reduce your symptoms. Don't use decongestants if you have high blood pressure.    Finish all antibiotic medicine. Do this even if you are feeling better after only a few days.  Follow-up care  Follow up with your healthcare provider, or as advised. If you had an X-ray or ECG (electrocardiogram), a specialist will review it. You will be told of any new test results that may affect your care.  If you are age 65 or older, if you smoke, or if you have a chronic lung disease or condition that affects your immune system, ask your healthcare provider about getting a pneumococcal vaccine and a yearly flu shot (influenza vaccine).  When to seek medical advice  Call your healthcare provider right away if any of these occur:    Fever of 100.4 F (38 C) or higher, or as directed by your healthcare provider    Coughing up more sputum    Weakness, drowsiness, headache, facial pain, ear pain, or a stiff neck  Call 911  Call 911 if any of these occur.    Coughing up blood    Weakness, drowsiness, headache, or stiff neck that get worse    Trouble breathing, wheezing, or pain with breathing  Date Last Reviewed: 6/1/2018 2000-2019 [employer] [facility]. [address] [place] [address]. All rights reserved. This information is not intended as a substitute for professional medical care. Always follow your healthcare professional's instructions.           Patient Education     Bronchospasm (Adult)    Bronchospasm occurs when the airways (bronchial tubes) go into spasm and contract. This makes it hard to breathe and causes wheezing (a high-pitched whistling sound). Bronchospasm can also cause frequent coughing without  wheezing.  Bronchospasm is due to irritation, inflammation, or allergic reaction of the airways. People with asthma get bronchospasm. However, not everyone with bronchospasm has asthma.  Being exposed to harmful fumes, a recent case of bronchitis, exercise, or a flare-up of chronic obstructive pulmonary disease (COPD) may cause the airways to spasm. An episode of bronchospasm may last 7 to 14 days. Medicine may be prescribed to relax the airways and prevent wheezing. Antibiotics will be prescribed only if your healthcare provider thinks there is a bacterial infection. Antibiotics do not help a viral infection.  Home care    Drink lots of water or other fluids (at least 10 glasses a day) during an attack. This will loosen lung secretions and make it easier to breathe. If you have heart or kidney disease, check with your doctor before you drink extra fluids.    Take prescribed medicine exactly at the times advised. If you take an inhaled medicine to help with breathing, don't use it more than once every 4 hours, unless told to do so. If prescribed an antibiotic or prednisone, take all of the medicine, even if you are feeling better after a few days.    Don't smoke. Also avoid being exposed to secondhand smoke.    If you were given an inhaler, use it exactly as directed. If you need to use it more often than prescribed, your condition may be getting worse. Contact your healthcare provider.  Follow-up care  Follow up with your healthcare provider, or as advised.  If you are age 65 or older, have a chronic lung disease or condition that affects your immune system, or you smoke, ask your healthcare provider about getting a pneumococcal vaccine, as well as a yearly flu shot (influenza vaccine).  When to seek medical advice  Call your healthcare provider right away if any of these occur:    You need to use your inhalers more often than usual    Fever of 100.4 F (38 C) or higher, or as directed by your healthcare  provider    Cough that brings up lots of dark-colored sputum (mucus)    You don't get better within 24 hours  Call 911  Call 911 if any of these occur:    Coughing up bloody sputum (mucus)    Chest pain with each breath    Increased wheezing or shortness of breath  Date Last Reviewed: 6/1/2018 2000-2019 [employer]. [address] [place] [address], [place] [address]. All rights reserved. This information is not intended as a substitute for professional medical care. Always follow your healthcare professional's instructions.

## 2020-01-10 NOTE — NURSING NOTE
The following nebulizer treatment was given:     MEDICATION: Duoneb  : Ambature  LOT #: [id number]  EXPIRATION DATE:  6/21  NDC # 4037-7532-17     Nebulizer Start Time:  11:34am  Nebulizer Stop Time:  11:44am    RICKI Mcgovern MA

## 2020-01-10 NOTE — TELEPHONE ENCOUNTER
Patient states has had a cough x 3 weeks.  Describes as slightly productive.    Currently states cough has moved to his head and now reports nasal congestion with clear mucous.  Reports wheezing.  Afebrile by report.  Denies chest pain.  Denies shortness of breath when not coughing.  Reports no relief with over the counter medications.  During FNA triage this RN notes a harsh frequent cough.    Protocol-  Cough- Acute Productive- Adult  Care advice reviewed.   Disposition- See Physician within 4 hours   Caller states understanding of the recommended disposition.   Caller plans to go to [facility].  Advised to call back if further questions or concerns.     CHRISTA Clarke RN  [facility]     Reason for Disposition    Wheezing is present    Additional Information    Negative: Severe difficulty breathing (e.g., struggling for each breath, speaks in single words)    Negative: Bluish (or gray) lips or face now    Negative: [1] Difficulty breathing AND [2] exposure to flames, smoke, or fumes    Negative: [1] Stridor AND [2] difficulty breathing    Negative: Sounds like a life-threatening emergency to the triager    Negative: Chest pain  (Exception: MILD central chest pain, present only when coughing)    Negative: Difficulty breathing    Negative: Patient sounds very sick or weak to the triager    Negative: [1] Coughed up blood AND [2] > 1 tablespoon (15 ml) (Exception: blood-tinged sputum)    Negative: Fever > 103 F (39.4 C)    Negative: [1] Fever > 101 F (38.3 C) AND [2] age > 60    Negative: [1] Fever > 100.0 F (37.8 C) AND [2] bedridden (e.g., nursing home patient, CVA, chronic illness, recovering from surgery)    Negative: [1] Fever > 100.0 F (37.8 C) AND [2] diabetes mellitus or weak immune system (e.g., HIV positive, cancer chemo, splenectomy, chronic steroids)    Protocols used: COUGH - ACUTE [id number]

## 2020-01-10 NOTE — PROGRESS NOTES
Subjective     Elliot Norris is a 59 year old male who presents to clinic today for the following health issues:    HPI   ENT Symptoms             Symptoms: cc Present Absent Comment   Fever/Chills   x    Fatigue  x     Muscle Aches  x     Eye Irritation   x    Sneezing  x     Nasal Junaid/Drg  x     Sinus Pressure/Pain   x    Loss of smell  x     Dental pain   x    Sore Throat  x  From cough   Swollen Glands   x    Ear Pain/Fullness   x    Cough  x     Wheeze  x     Chest Pain  x  From cough   Shortness of breath  x     Rash   x    Other         Symptom duration:  about 1 month   Symptom severity:  severe   Treatments tried:  Cough drops, Mucinex & Robitussin   Contacts:         No fever  Pain from coughing - he wants to make it very clear this is not from his heart  Some shortness of breath with stairs, mild  Took dayquil this morning    BP high today - asymptomatic  States he used to be on BP medication  High BP largely related to diet  Fainted with BP medication when not eating salt and took BP med      Patient Active Problem List   Diagnosis     ED (erectile dysfunction)     Hyperlipidemia LDL goal <160     Fatty liver     Insomnia     Obesity     Hypertension goal BP (blood pressure) < 140/90     Prediabetes     History of basal cell carcinoma     Scar     Past Surgical History:   Procedure Laterality Date     MOHS MICROGRAPHIC PROCEDURE         Social History     Tobacco Use     Smoking status: Never Smoker     Smokeless tobacco: Never Used   Substance Use Topics     Alcohol use: Yes     Comment: 1 drink per week     Family History   Problem Relation Age of Onset     Diabetes Mother      Arthritis Mother      Gynecology Mother      Pancreatic Cancer Mother      Diabetes Father      Hypertension Father      Alcohol/Drug Father      Circulatory Father      Depression Father      Heart Disease Father      Lipids Father      Cerebrovascular Disease Father          age 78     Diabetes Maternal  Grandmother      Cerebrovascular Disease Maternal Grandfather      Prostate Cancer Maternal Grandfather      Prostate Cancer Paternal Grandfather      Depression Paternal Grandmother      Depression Sister          Current Outpatient Medications   Medication Sig Dispense Refill     albuterol (VENTOLIN HFA) 108 (90 Base) MCG/ACT inhaler Inhale 2 puffs into the lungs every 4 hours as needed for shortness of breath / dyspnea or wheezing 8.5 g 1     desonide (DESOWEN) 0.05 % external ointment Apply topically 2 times daily For 5-7 days to AA on the left eyelids and neck. 60 g 0     diphenhydrAMINE (BENADRYL) 25 MG tablet Take 25 mg by mouth At Bedtime        doxycycline monohydrate (ADOXA) 100 MG tablet Take 1 tablet (100 mg) by mouth daily for 7 days 7 tablet 0     ketoconazole (NIZORAL) 2 % external cream Apply topically daily 30 g 3     predniSONE (DELTASONE) 20 MG tablet Take 2 tablets (40 mg) by mouth daily for 5 days 10 tablet 0     simvastatin (ZOCOR) 40 MG tablet Take 1 tablet (40 mg) by mouth At Bedtime 90 tablet 3     Allergies   Allergen Reactions     Seasonal Allergies      BP Readings from Last 3 Encounters:   01/10/20 (!) 175/100   09/23/19 122/80   08/20/19 135/88    Wt Readings from Last 3 Encounters:   01/10/20 117.9 kg (260 lb)   08/20/19 113.1 kg (249 lb 6.4 oz)   03/07/18 113.9 kg (251 lb)                      Reviewed and updated as needed this visit by Provider  Tobacco  Allergies  Meds  Problems  Med Hx  Surg Hx  Fam Hx         Review of Systems   ROS COMP: Constitutional, HEENT, cardiovascular, pulmonary, GI, , musculoskeletal, neuro, skin, endocrine and psych systems are negative, except as otherwise noted.      Objective    BP (!) 175/100   Pulse 86   Temp 98.5  F (36.9  C) (Oral)   Ht 1.829 m (6')   Wt 117.9 kg (260 lb)   SpO2 97%   BMI 35.26 kg/m    Body mass index is 35.26 kg/m .  Physical Exam   GENERAL: healthy, alert and no distress  EYES: Eyes grossly normal to inspection,  PERRL and conjunctivae and sclerae normal  HENT: ear canals and TM's normal, nose and mouth without ulcers or lesions  NECK: no adenopathy, no asymmetry, masses, or scars and thyroid normal to palpation  RESP: pre-neb mild exp wheezing throughout. Post-neb: lungs clear to auscultation - no rales, rhonchi or wheezes  CV: regular rate and rhythm, normal S1 S2, no S3 or S4, no murmur, click or rub  MS: no gross musculoskeletal defects noted, no edema  PSYCH: mentation appears normal, affect normal/bright    Diagnostic Test Results:  Labs reviewed in Epic        Assessment & Plan       ICD-10-CM    1. Acute bronchitis with symptoms > 10 days J20.9 predniSONE (DELTASONE) 20 MG tablet     albuterol (VENTOLIN HFA) 108 (90 Base) MCG/ACT inhaler     doxycycline monohydrate (ADOXA) 100 MG tablet   2. Acute bronchospasm J98.01 ipratropium - albuterol 0.5 mg/2.5 mg/3 mL (DUONEB) neb solution 3 mL     predniSONE (DELTASONE) 20 MG tablet     albuterol (VENTOLIN HFA) 108 (90 Base) MCG/ACT inhaler   3. Hypertension goal BP (blood pressure) < 140/90 I10    Push fluids, rest  Start antibiotic today - doxycycline    Know that your cough may not be completely gone when you're done with your antibiotics  Start prednisone today - take when you get it and then in the mornings (steroid)  Albuterol inhaler for cough/wheeze   You can use a humidifier by your bed at night. Distilled water should be used with the humidifier.  Tylenol or ibuprofen as needed for pain or fever  Follow up if you are worsening or not improving in 1 week       See Patient Instructions    Return in about 1 week (around 1/17/2020).     The benefits, risks and potential side effects were discussed in detail. Black box warnings discussed as relevant. All patient questions were answered. The patient was instructed to follow up immediately if any adverse reactions develop.    Return precautions discussed, including when to seek urgent/emergent care.    Patient verbalizes  understanding and agrees with plan of care. Patient stable for discharge.      MARGRET Sadler [facility]

## 2020-01-30 ENCOUNTER — OFFICE VISIT (OUTPATIENT)
Dept: FAMILY MEDICINE | Facility: CLINIC | Age: 60
End: 2020-01-30
Payer: COMMERCIAL

## 2020-01-30 ENCOUNTER — TELEPHONE (OUTPATIENT)
Dept: FAMILY MEDICINE | Facility: CLINIC | Age: 60
End: 2020-01-30

## 2020-01-30 VITALS
OXYGEN SATURATION: 98 % | TEMPERATURE: 98.6 F | BODY MASS INDEX: 35.26 KG/M2 | DIASTOLIC BLOOD PRESSURE: 91 MMHG | SYSTOLIC BLOOD PRESSURE: 155 MMHG | HEIGHT: 72 IN | HEART RATE: 92 BPM

## 2020-01-30 DIAGNOSIS — J98.01 ACUTE BRONCHOSPASM: ICD-10-CM

## 2020-01-30 DIAGNOSIS — J04.0 LARYNGITIS: Primary | ICD-10-CM

## 2020-01-30 PROCEDURE — 99214 OFFICE O/P EST MOD 30 MIN: CPT | Performed by: FAMILY MEDICINE

## 2020-01-30 RX ORDER — PREDNISONE 20 MG/1
40 TABLET ORAL DAILY
Qty: 10 TABLET | Refills: 0 | Status: SHIPPED | OUTPATIENT
Start: 2020-01-30 | End: 2020-06-03

## 2020-01-30 RX ORDER — FLUTICASONE PROPIONATE 110 UG/1
1 AEROSOL, METERED RESPIRATORY (INHALATION) 2 TIMES DAILY
Qty: 12 G | Refills: 1 | Status: SHIPPED | OUTPATIENT
Start: 2020-01-30 | End: 2020-09-21

## 2020-01-30 NOTE — TELEPHONE ENCOUNTER
Reason for Call:  Medication or medication refill:    Do you use a [facility]?  Name of the pharmacy and phone number for the current request:      [id number] [place] [address] & [facility]    Name of the medication requested: fluticasone (FLOVENT HFA) 110 MCG/ACT inhaler       Other request: Elliot was just in & is at the pharmacy and the medication is too expensive. Wondering if they can have the RX changed--having it as Generic Product or any other specific brand name.     He just wants RX sent. No need for a callback.    Can we leave a detailed message on this number? NO    Phone number patient can be reached at: Home number on file [phone] (home)    Best Time: any    Call taken on 1/30/2020 at 3:02 PM by Chun Medellin

## 2020-01-30 NOTE — TELEPHONE ENCOUNTER
Could you call and see if pharmacist has a recommendation for a formulary inhaled steroid?    Qvar, pulmicort, asmanex, flovent discus

## 2020-01-30 NOTE — PROGRESS NOTES
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  Constitutional: no recent illness, no fevers/sweats/chills  Eyes: No vision changes or eye irritation  Ears/Nose/Throat: No runny nose,  or ear pain  Resp: + cough.      I have reviewed and updated the patient's past medical history in the EMR. Current problems are:    Patient Active Problem List   Diagnosis     ED (erectile dysfunction)     Hyperlipidemia LDL goal <160     Fatty liver     Insomnia     Obesity     Hypertension goal BP (blood pressure) < 140/90     Prediabetes     History of basal cell carcinoma     Scar     Past Surgical History:   Procedure Laterality Date     MOHS MICROGRAPHIC PROCEDURE         Social History     Tobacco Use     Smoking status: Never Smoker     Smokeless tobacco: Never Used   Substance Use Topics     Alcohol use: Yes     Comment: 1 drink per week     Family History   Problem Relation Age of Onset     Diabetes Mother      Arthritis Mother      Gynecology Mother      Pancreatic Cancer Mother      Diabetes Father      Hypertension Father      Alcohol/Drug Father      Circulatory Father      Depression Father      Heart Disease Father      Lipids Father      Cerebrovascular Disease Father          age 78     Diabetes Maternal Grandmother      Cerebrovascular Disease Maternal Grandfather      Prostate Cancer Maternal Grandfather      Prostate Cancer Paternal Grandfather      Depression Paternal Grandmother      Depression Sister          Allergies, reviewed:     Allergies   Allergen Reactions     Seasonal Allergies        Current Outpatient Medications   Medication Sig Dispense Refill     albuterol (VENTOLIN HFA) 108 (90  Base) MCG/ACT inhaler Inhale 2 puffs into the lungs every 4 hours as needed for shortness of breath / dyspnea or wheezing 8.5 g 1     desonide (DESOWEN) 0.05 % external ointment Apply topically 2 times daily For 5-7 days to AA on the left eyelids and neck. 60 g 0     diphenhydrAMINE (BENADRYL) 25 MG tablet Take 25 mg by mouth At Bedtime        fluticasone (FLOVENT HFA) 110 MCG/ACT inhaler Inhale 1 puff into the lungs 2 times daily 12 g 1     ketoconazole (NIZORAL) 2 % external cream Apply topically daily 30 g 3     predniSONE (DELTASONE) 20 MG tablet Take 2 tablets (40 mg) by mouth daily 10 tablet 0     simvastatin (ZOCOR) 40 MG tablet Take 1 tablet (40 mg) by mouth At Bedtime 90 tablet 3       Objective:  BP (!) 155/91 (BP Location: Left arm, Patient Position: Sitting, Cuff Size: Adult Large)   Pulse 92   Temp 98.6  F (37  C) (Oral)   Ht 1.829 m (6')   SpO2 98%   BMI 35.26 kg/m    EXAM:  General Appearance: Pleasant, alert, male, WN/WD in no acute respiratory distress.  Ear Exam: Normal auditory canals and external ears. Non-tender.  Left TM-normal. Right TM-normal.  OroPharynx Exam: Dental hygiene adequate. Normal buccal mucosa. Normal pharynx.  Chest/Respiratory Exam: Normal, comfortable, easy respirations. Chest wall normal. Lungs are clear to auscultation. No wheezing, crackles, or rhonchi.  Cardiovascular Exam: Regular rate and rhythm. No murmur, gallop, or rubs. No pedal edema.  Skin: no rash, warm and dry.  Neurologic Exam: Nonfocal, no tremor. Normal gait.  Psychiatric Exam: Alert - appropriate, normal affect      ASSESSMENT/PLAN:    ICD-10-CM    1. Laryngitis J04.0 predniSONE (DELTASONE) 20 MG tablet     fluticasone (FLOVENT HFA) 110 MCG/ACT inhaler   2. Acute bronchospasm J98.01 predniSONE (DELTASONE) 20 MG tablet     fluticasone (FLOVENT HFA) 110 MCG/ACT inhaler     Patient with post respiratory infection cough and persistent laryngitis  The steroid treatment that he had he said was somewhat helpful  and this will help  Main stay of treatment of this condition is voice rest and he has been sort of doing that  Also some inhaled steroids instead of albuterol I think will help better with the cough and also likely to be less irritating         Return in about 1 year (around 1/30/2021) for Physical, or as needed if today's problem persists.      Paulino Roberto MD, MPH

## 2020-01-30 NOTE — TELEPHONE ENCOUNTER
Called pharmacy   Flovent is covered but is expensive - $242 copay  Pharmacist said other options pretty much the same cost too   They don't know cost of alternative until they run something else  She said pt just paid OOP though    Called pt   He paid for inhaler   States he can take it back though if there is something cheaper   Told pt inhalers cannot be returned to pharmacy     If pt needs refill he will contact insurance to see what's cheaper then let us know  Otherwise informed him of GoodRx too to see if cheaper without insurance   Narda ZHONG RN

## 2020-05-17 ENCOUNTER — MYC MEDICAL ADVICE (OUTPATIENT)
Dept: FAMILY MEDICINE | Facility: CLINIC | Age: 60
End: 2020-05-17

## 2020-09-09 ENCOUNTER — TELEPHONE (OUTPATIENT)
Dept: FAMILY MEDICINE | Facility: CLINIC | Age: 60
End: 2020-09-09

## 2020-09-09 DIAGNOSIS — R73.03 PREDIABETES: ICD-10-CM

## 2020-09-09 DIAGNOSIS — E78.5 HYPERLIPIDEMIA LDL GOAL <160: Primary | ICD-10-CM

## 2020-09-09 DIAGNOSIS — I10 HYPERTENSION GOAL BP (BLOOD PRESSURE) < 140/90: ICD-10-CM

## 2020-09-09 NOTE — TELEPHONE ENCOUNTER
Reason for Call:  Lab orders     Detailed comments:  Pt is calling in to book lab appt. There are no lab orders.  He stated that he used to have standing orders from [facility].  Asking if that can be done again.  Please let patient know when labs have been ordered so he can complete.      Phone Number Patient can be reached at: Home number on file [phone] (home)    Best Time: Any     Can we leave a detailed message on this number? YES    Call taken on 9/9/2020 at 10:39 AM by Caron Martinez

## 2020-09-09 NOTE — TELEPHONE ENCOUNTER
JF,   Pt due for fasting physical  Requesting lab orders for BMP, Lipids, and A1c plus whatever you'd like  Would like pre appt labs checked  Please advise  Thanks,  Narda ZHONG RN

## 2020-09-21 ENCOUNTER — OFFICE VISIT (OUTPATIENT)
Dept: FAMILY MEDICINE | Facility: CLINIC | Age: 60
End: 2020-09-21
Payer: COMMERCIAL

## 2020-09-21 VITALS — SYSTOLIC BLOOD PRESSURE: 138 MMHG | DIASTOLIC BLOOD PRESSURE: 68 MMHG

## 2020-09-21 DIAGNOSIS — L57.0 ACTINIC KERATOSIS: ICD-10-CM

## 2020-09-21 DIAGNOSIS — D22.5 MELANOCYTIC NEVI OF TRUNK: ICD-10-CM

## 2020-09-21 DIAGNOSIS — L82.1 SEBORRHEIC KERATOSIS: ICD-10-CM

## 2020-09-21 DIAGNOSIS — I10 HYPERTENSION GOAL BP (BLOOD PRESSURE) < 140/90: ICD-10-CM

## 2020-09-21 DIAGNOSIS — R73.03 PREDIABETES: ICD-10-CM

## 2020-09-21 DIAGNOSIS — Z85.828 HISTORY OF BASAL CELL CARCINOMA: Primary | ICD-10-CM

## 2020-09-21 DIAGNOSIS — E78.5 HYPERLIPIDEMIA LDL GOAL <160: ICD-10-CM

## 2020-09-21 LAB
ANION GAP SERPL CALCULATED.3IONS-SCNC: 5 MMOL/L (ref 3–14)
BUN SERPL-MCNC: 10 MG/DL (ref 7–30)
CALCIUM SERPL-MCNC: 9 MG/DL (ref 8.5–10.1)
CHLORIDE SERPL-SCNC: 107 MMOL/L (ref 94–109)
CHOLEST SERPL-MCNC: 188 MG/DL
CO2 SERPL-SCNC: 27 MMOL/L (ref 20–32)
CREAT SERPL-MCNC: 1.08 MG/DL (ref 0.66–1.25)
GFR SERPL CREATININE-BSD FRML MDRD: 74 ML/MIN/{1.73_M2}
GLUCOSE SERPL-MCNC: 99 MG/DL (ref 70–99)
HBA1C MFR BLD: 5.8 % (ref 0–5.6)
HDLC SERPL-MCNC: 41 MG/DL
LDLC SERPL CALC-MCNC: 99 MG/DL
NONHDLC SERPL-MCNC: 147 MG/DL
POTASSIUM SERPL-SCNC: 3.9 MMOL/L (ref 3.4–5.3)
SODIUM SERPL-SCNC: 139 MMOL/L (ref 133–144)
TRIGL SERPL-MCNC: 240 MG/DL

## 2020-09-21 PROCEDURE — 83036 HEMOGLOBIN GLYCOSYLATED A1C: CPT | Performed by: FAMILY MEDICINE

## 2020-09-21 PROCEDURE — 17000 DESTRUCT PREMALG LESION: CPT | Performed by: FAMILY MEDICINE

## 2020-09-21 PROCEDURE — 36415 COLL VENOUS BLD VENIPUNCTURE: CPT | Performed by: FAMILY MEDICINE

## 2020-09-21 PROCEDURE — 80061 LIPID PANEL: CPT | Performed by: FAMILY MEDICINE

## 2020-09-21 PROCEDURE — 80048 BASIC METABOLIC PNL TOTAL CA: CPT | Performed by: FAMILY MEDICINE

## 2020-09-21 PROCEDURE — 99213 OFFICE O/P EST LOW 20 MIN: CPT | Mod: 25 | Performed by: FAMILY MEDICINE

## 2020-09-21 PROCEDURE — 17003 DESTRUCT PREMALG LES 2-14: CPT | Performed by: FAMILY MEDICINE

## 2020-09-21 RX ORDER — DIAPER,BRIEF,INFANT-TODD,DISP
EACH MISCELLANEOUS 2 TIMES DAILY
COMMUNITY
End: 2021-10-15

## 2020-09-21 NOTE — LETTER
9/21/2020         RE: Elliot Norris  [address]  [facility] [id number]        Dear Colleague,    Thank you for [place] your [place], [place] [facility] [place] see a copy of my visit note below.    [facility] - PRIMARY CARE SKIN    CC : skin cancer screening (full-body)  SUBJECTIVE:                                                    Elliot Norris is a 60 year old male who presents to clinic today for a full-body skin cancer screening because of his history of skin cancer.    Bothersome lesions noticed by the patient or other skin concerns :  Issue One : some scaly patches on the scalp    Personal history of skin cancer : YES - basal cell carcinoma, actinic keratoses, mild dysplastic nevus(i) on the left abdomen and back midline at T4  Family history of skin cancer : YES - in father.     Sun Exposure History  Previous history of significant sun exposure:  Blistering sunburns : NO  Tanning beds : NO.  Sunscreen Use : YES.  UV-protective clothing use : NO  Wide-brimmed hats : NO - He has been applying sunscreen on the scalp but irregularly.  UV-protective sunglasses : NO  Avoids mid-day sun : YES     Occupation : self-employed,  (indoor).    Refer to electronic medical record (EMR) for past medical history and medications.    INTEGUMENTARY/SKIN: POSITIVE for rash  ROS : 14 point review of systems was negative except the symptoms listed above in the HPI.        OBJECTIVE:                                                    GENERAL: healthy, alert and no distress  SKIN: Interiano Skin Type - I.  This patient was examined from the top of the head to the bottom of the feet  including scalp, face, neck, trunk, buttocks, both arms, both legs, both hands, both feet, and all fingers and toes. The dermatoscope was used to help evaluate pigmented lesions.  Skin Pertinent Findings:                  Left upper forehead - old scar                   Left forearm- 10 mm hypopigmentation scar                  Trunk, arms, legs, :           Brown, macule(s) most consistent with benign solar lentigo          Raised, coarse textured, stuck appearing lesion consistent with seborrheic keratosis .          Slightly raised, red lesion(s) consistent with capillary hemangioma          Brown macules of various sizes and shapes most consistent with (benign) melanocytic nevi    Scalp :                   Erythematous, scaly, non-indurated lesion(s) most consistent with actinic keratosis involving   Name: Liquid nitrogen Cry-Ac cryotherapy  Indication: Pre-malignant actinic keratosis  Completed by: Julia Madrigal MD.  Note : Discussed natural history of lesion and treatment options. Prior to treatment, we discussed inflammation, tenderness post-procedure, the healing process, and the risks of pain, infection, scarring, blistering, and hypo-/hyperpigmentation after healing. Explained that these lesions may grow back and may need additional treatment or re-evaluation. The patient understood and verbally agreed to proceed with cryotherapy.    Each actinic keratosis was treated using liquid nitrogen Cry-Ac with a two five second bursts with a pause to allow for the area to thaw.    The patient tolerated the procedure well and left in good condition. If this lesion should persist or recur, then it needs to be re-evaluated.  Total number of lesions treated: 7        Diagnostic Test Results:      ASSESSMENT:                                                      Encounter Diagnoses   Name Primary?     History of basal cell carcinoma Yes     Melanocytic nevi of trunk      Actinic keratosis      Seborrheic keratosis          PLAN:                                                    Patient Instructions   Recheck in one year    ACTINIC KERATOSES POST-TREATMENT CARE INSTRUCTIONS  Actinic keratoses are benign, scaly or gritty lesions that appear in sun-exposed areas and may progress to  skin cancers. For this reason, it is important to treat them before they become cancerous. Liquid nitrogen is the most commonly used and most effective treatment for actinic keratoses; it is mildly uncomfortable when applied to the skin, but the discomfort rapidly subsides.    Post-Treatment:  You may experience burning and/or stinging immediately following the procedure. The discomfort from the procedure may persist over the next 12-24 hours. The area treated will look pinker and slightly swollen before the healing process begins. You may also notice redness, swelling, tenderness, weeping and crusts or scabs. Healing time is approximately 10-14 days.    Blister - You may or may notice blistering from the freezing. If you develop an uncomfortable blister from today's treatment, you may gently puncture this with a needle that has been cleaned with alcohol. However, do not remove the protective skin layer of the blister.    Scab - After a few days, you may notice scaliness or scab formation. Do not pick at the scabs because this may cause slower healing and a permanent scar.    The skin may appear temporarily darker at the treatment site, but this usually fades over a period of months, provided that the area is protected from the sun.    Care of the areas treated:    Wash the area with a mild cleanser.    Gently pat dry.    Do not rub.     Keep protected from the sun during the healing process and for a full year following treatment as the skin continues to remodel during this time.    Apply Vaseline or Aquaphor ointment sparingly to the site for the first 7 days after treatment.    Do not use Neosporin, as many people eventually develop a medication allergy, that can easily be confused with an infection, to Neomycin.    Return if:  There should not be any residual scaling. If there is any concern that the lesion has persisted after 4-6 weeks, make an appointment for a re-check. Healing time does vary depending on  your individual healing process and the area of the body treated. Most patients will be healed in one month.    Signs of Infection:  Thankfully this is rare. However if you notice persistent colored drainage, increasing pain, fever or other signs of infection, please call us at: [phone]      The patient was counseled about sunscreens and sun avoidance. The patient was counseled to check the skin regularly and report any lesion that is new, changing, itching, scabbing, bleeding or otherwise bothersome. The patient was discharged ambulatory and in stable condition.    TT : 25 minutes.  CT : 15 minutes.        Again, thank you for allowing me to participate in the care of your patient.        Sincerely,        Parisa Madrigal MD

## 2020-09-21 NOTE — PROGRESS NOTES
[facility] - PRIMARY CARE SKIN    CC : skin cancer screening (full-body)  SUBJECTIVE:                                                    Elliot Norris is a 60 year old male who presents to clinic today for a full-body skin cancer screening because of his history of skin cancer.    Bothersome lesions noticed by the patient or other skin concerns :  Issue One : some scaly patches on the scalp    Personal history of skin cancer : YES - basal cell carcinoma, actinic keratoses, mild dysplastic nevus(i) on the left abdomen and back midline at T4  Family history of skin cancer : YES - in father.     Sun Exposure History  Previous history of significant sun exposure:  Blistering sunburns : NO  Tanning beds : NO.  Sunscreen Use : YES.  UV-protective clothing use : NO  Wide-brimmed hats : NO - He has been applying sunscreen on the scalp but irregularly.  UV-protective sunglasses : NO  Avoids mid-day sun : YES     Occupation : self-employed,  (indoor).    Refer to electronic medical record (EMR) for past medical history and medications.    INTEGUMENTARY/SKIN: POSITIVE for rash  ROS : 14 point review of systems was negative except the symptoms listed above in the HPI.        OBJECTIVE:                                                    GENERAL: healthy, alert and no distress  SKIN: Interiano Skin Type - I.  This patient was examined from the top of the head to the bottom of the feet  including scalp, face, neck, trunk, buttocks, both arms, both legs, both hands, both feet, and all fingers and toes. The dermatoscope was used to help evaluate pigmented lesions.  Skin Pertinent Findings:                  Left upper forehead - old scar                  Left forearm- 10 mm hypopigmentation scar                  Trunk, arms, legs, :           Brown, macule(s) most consistent with benign solar lentigo          Raised, coarse textured, stuck appearing lesion consistent with seborrheic keratosis .           Slightly raised, red lesion(s) consistent with capillary hemangioma          Brown macules of various sizes and shapes most consistent with (benign) melanocytic nevi    Scalp :                   Erythematous, scaly, non-indurated lesion(s) most consistent with actinic keratosis involving   Name: Liquid nitrogen Cry-Ac cryotherapy  Indication: Pre-malignant actinic keratosis  Completed by: Julia Madrigal MD.  Note : Discussed natural history of lesion and treatment options. Prior to treatment, we discussed inflammation, tenderness post-procedure, the healing process, and the risks of pain, infection, scarring, blistering, and hypo-/hyperpigmentation after healing. Explained that these lesions may grow back and may need additional treatment or re-evaluation. The patient understood and verbally agreed to proceed with cryotherapy.    Each actinic keratosis was treated using liquid nitrogen Cry-Ac with a two five second bursts with a pause to allow for the area to thaw.    The patient tolerated the procedure well and left in good condition. If this lesion should persist or recur, then it needs to be re-evaluated.  Total number of lesions treated: 7        Diagnostic Test Results:      ASSESSMENT:                                                      Encounter Diagnoses   Name Primary?     History of basal cell carcinoma Yes     Melanocytic nevi of trunk      Actinic keratosis      Seborrheic keratosis          PLAN:                                                    Patient Instructions   Recheck in one year    ACTINIC KERATOSES POST-TREATMENT CARE INSTRUCTIONS  Actinic keratoses are benign, scaly or gritty lesions that appear in sun-exposed areas and may progress to skin cancers. For this reason, it is important to treat them before they become cancerous. Liquid nitrogen is the most commonly used and most effective treatment for actinic keratoses; it is mildly uncomfortable when applied to the skin, but the discomfort  rapidly subsides.    Post-Treatment:  You may experience burning and/or stinging immediately following the procedure. The discomfort from the procedure may persist over the next 12-24 hours. The area treated will look pinker and slightly swollen before the healing process begins. You may also notice redness, swelling, tenderness, weeping and crusts or scabs. Healing time is approximately 10-14 days.    Blister - You may or may notice blistering from the freezing. If you develop an uncomfortable blister from today's treatment, you may gently puncture this with a needle that has been cleaned with alcohol. However, do not remove the protective skin layer of the blister.    Scab - After a few days, you may notice scaliness or scab formation. Do not pick at the scabs because this may cause slower healing and a permanent scar.    The skin may appear temporarily darker at the treatment site, but this usually fades over a period of months, provided that the area is protected from the sun.    Care of the areas treated:    Wash the area with a mild cleanser.    Gently pat dry.    Do not rub.     Keep protected from the sun during the healing process and for a full year following treatment as the skin continues to remodel during this time.    Apply Vaseline or Aquaphor ointment sparingly to the site for the first 7 days after treatment.    Do not use Neosporin, as many people eventually develop a medication allergy, that can easily be confused with an infection, to Neomycin.    Return if:  There should not be any residual scaling. If there is any concern that the lesion has persisted after 4-6 weeks, make an appointment for a re-check. Healing time does vary depending on your individual healing process and the area of the body treated. Most patients will be healed in one month.    Signs of Infection:  Thankfully this is rare. However if you notice persistent colored drainage, increasing pain, fever or other signs of infection,  please call us at: [phone]      The patient was counseled about sunscreens and sun avoidance. The patient was counseled to check the skin regularly and report any lesion that is new, changing, itching, scabbing, bleeding or otherwise bothersome. The patient was discharged ambulatory and in stable condition.    TT : 25 minutes.  CT : 15 minutes.

## 2020-09-21 NOTE — PATIENT INSTRUCTIONS
Recheck in one year    ACTINIC KERATOSES POST-TREATMENT CARE INSTRUCTIONS  Actinic keratoses are benign, scaly or gritty lesions that appear in sun-exposed areas and may progress to skin cancers. For this reason, it is important to treat them before they become cancerous. Liquid nitrogen is the most commonly used and most effective treatment for actinic keratoses; it is mildly uncomfortable when applied to the skin, but the discomfort rapidly subsides.    Post-Treatment:  You may experience burning and/or stinging immediately following the procedure. The discomfort from the procedure may persist over the next 12-24 hours. The area treated will look pinker and slightly swollen before the healing process begins. You may also notice redness, swelling, tenderness, weeping and crusts or scabs. Healing time is approximately 10-14 days.    Blister - You may or may notice blistering from the freezing. If you develop an uncomfortable blister from today's treatment, you may gently puncture this with a needle that has been cleaned with alcohol. However, do not remove the protective skin layer of the blister.    Scab - After a few days, you may notice scaliness or scab formation. Do not pick at the scabs because this may cause slower healing and a permanent scar.    The skin may appear temporarily darker at the treatment site, but this usually fades over a period of months, provided that the area is protected from the sun.    Care of the areas treated:    Wash the area with a mild cleanser.    Gently pat dry.    Do not rub.     Keep protected from the sun during the healing process and for a full year following treatment as the skin continues to remodel during this time.    Apply Vaseline or Aquaphor ointment sparingly to the site for the first 7 days after treatment.    Do not use Neosporin, as many people eventually develop a medication allergy, that can easily be confused with an infection, to Neomycin.    Return if:  There  should not be any residual scaling. If there is any concern that the lesion has persisted after 4-6 weeks, make an appointment for a re-check. Healing time does vary depending on your individual healing process and the area of the body treated. Most patients will be healed in one month.    Signs of Infection:  Thankfully this is rare. However if you notice persistent colored drainage, increasing pain, fever or other signs of infection, please call us at: [phone]

## 2020-09-29 ENCOUNTER — OFFICE VISIT (OUTPATIENT)
Dept: FAMILY MEDICINE | Facility: CLINIC | Age: 60
End: 2020-09-29
Payer: COMMERCIAL

## 2020-09-29 VITALS
HEART RATE: 77 BPM | HEIGHT: 72 IN | DIASTOLIC BLOOD PRESSURE: 90 MMHG | BODY MASS INDEX: 33.05 KG/M2 | WEIGHT: 244 LBS | TEMPERATURE: 98 F | SYSTOLIC BLOOD PRESSURE: 149 MMHG | OXYGEN SATURATION: 95 %

## 2020-09-29 DIAGNOSIS — E78.5 HYPERLIPIDEMIA LDL GOAL <160: ICD-10-CM

## 2020-09-29 DIAGNOSIS — Z00.00 ROUTINE HISTORY AND PHYSICAL EXAMINATION OF ADULT: Primary | ICD-10-CM

## 2020-09-29 DIAGNOSIS — R73.03 PREDIABETES: ICD-10-CM

## 2020-09-29 DIAGNOSIS — Z23 NEED FOR VACCINATION: ICD-10-CM

## 2020-09-29 DIAGNOSIS — I10 HYPERTENSION GOAL BP (BLOOD PRESSURE) < 140/90: ICD-10-CM

## 2020-09-29 PROCEDURE — 99396 PREV VISIT EST AGE 40-64: CPT | Performed by: FAMILY MEDICINE

## 2020-09-29 RX ORDER — SIMVASTATIN 40 MG
40 TABLET ORAL AT BEDTIME
Qty: 90 TABLET | Refills: 3 | Status: SHIPPED | OUTPATIENT
Start: 2020-09-29 | End: 2021-12-13

## 2020-09-29 ASSESSMENT — MIFFLIN-ST. JEOR: SCORE: 1954.78

## 2020-09-29 NOTE — PROGRESS NOTES
SUBJECTIVE:   CC: Elliot Norris is an 60 year old male who presents for preventative health visit.       Patient has been advised of split billing requirements and indicates understanding: Yes  phy  Healthy Habits:     Getting at least 3 servings of Calcium per day:  Yes    Bi-annual eye exam:  NO    Dental care twice a year:  Yes    Sleep apnea or symptoms of sleep apnea:  None    Diet:  Other    Frequency of exercise:  4-5 days/week    Duration of exercise:  30-45 minutes    Taking medications regularly:  Yes    Medication side effects:  None    PHQ-2 Total Score: 0    Additional concerns today:  Yes    Lost 16 lbs.  covid keeps him from going to restaurants    A minor sore throat about 2 weeks  In the morning  Better right now  Signs in vocal essence  Has been having some itchy eyes, sneezing  Not snoring really,   Sometimes will get some gerd    Exam:     He has noted some skin lesions he wants checked at this visit. Observations by patient are increasing thickness, itching, tendency to be traumatized. Exam of skin shows actinic keratosis. Patient is reassured these are benign lesions. Asymptomatic lesions can be observed for changes or symptoms over time. Symptomatic lesions can be treated if desired; to be scheduled at a later date. Sun protection to prevent skin cancer and the signs and symptoms of malignant skin lesions are reviewed with him today.        Today's PHQ-2 Score:   PHQ-2 ( 1999 Pf[employer]) 1/30/2020   Q1: Little interest or pleasure in doing things 0   Q2: Feeling down, depressed or hopeless 0   PHQ-2 Score 0   Q1: Little interest or pleasure in doing things -   Q2: Feeling down, depressed or hopeless -   PHQ-2 Score -       Abuse: Current or Past(Physical, Sexual or Emotional)- No  Do you feel safe in your environment? Yes    Have you ever done Advance Care Planning? (For example, a Health Directive, POLST, or a discussion with a medical provider or your loved ones about your wishes): Yes,  patient states has an Advance Care Planning document and will bring a copy to the clinic.    Social History     Tobacco Use     Smoking status: Never Smoker     Smokeless tobacco: Never Used   Substance Use Topics     Alcohol use: Yes     Comment: 1 drink per week     If you drink alcohol do you typically have >3 drinks per day or >7 drinks per week? No    Alcohol Use 2019   Prescreen: >3 drinks/day or >7 drinks/week? No   Prescreen: >3 drinks/day or >7 drinks/week? -       Last PSA:   PSA   Date Value Ref Range Status   2010 1.39 ng/mL        Reviewed orders with patient. Reviewed health maintenance and updated orders accordingly - Yes  Lab work is in process  Labs reviewed in EPIC  BP Readings from Last 3 Encounters:   20 (!) 149/90   20 138/68   20 (!) 155/91    Wt Readings from Last 3 Encounters:   20 110.7 kg (244 lb)   01/10/20 117.9 kg (260 lb)   19 113.1 kg (249 lb 6.4 oz)                  Patient Active Problem List   Diagnosis     ED (erectile dysfunction)     Hyperlipidemia LDL goal <160     Fatty liver     Insomnia     Obesity     Hypertension goal BP (blood pressure) < 140/90     Prediabetes     History of basal cell carcinoma     Scar     Past Surgical History:   Procedure Laterality Date     MOHS MICROGRAPHIC PROCEDURE         Social History     Tobacco Use     Smoking status: Never Smoker     Smokeless tobacco: Never Used   Substance Use Topics     Alcohol use: Yes     Comment: 3 drinks per week     Family History   Problem Relation Age of Onset     Diabetes Mother      Arthritis Mother      Gynecology Mother      Pancreatic Cancer Mother      Diabetes Father      Hypertension Father      Alcohol/Drug Father      Circulatory Father      Depression Father      Heart Disease Father      Lipids Father      Cerebrovascular Disease Father          age 78     Diabetes Maternal Grandmother      Cerebrovascular Disease Maternal Grandfather      Prostate Cancer  Maternal Grandfather      Prostate Cancer Paternal Grandfather      Depression Paternal Grandmother      Depression Sister          Current Outpatient Medications   Medication Sig Dispense Refill     diphenhydrAMINE (BENADRYL) 25 MG tablet Take 50 mg by mouth At Bedtime        hydrocortisone (CORTAID) 0.5 % external ointment Apply topically 2 times daily       simvastatin (ZOCOR) 40 MG tablet TAKE 1 TABLET(40 MG) BY MOUTH AT BEDTIME 30 tablet 0     Allergies   Allergen Reactions     Seasonal Allergies        Reviewed and updated as needed this visit by clinical staff         Reviewed and updated as needed this visit by Provider            Review of Systems  CONSTITUTIONAL: NEGATIVE for fever, chills, change in weight  EYES: NEGATIVE for vision changes or irritation  ENT: NEGATIVE for ear, mouth and throat problems  RESP: NEGATIVE for significant cough or SOB  CV: NEGATIVE for chest pain, palpitations or peripheral edema  GI: NEGATIVE for nausea, abdominal pain, heartburn, or change in bowel habits   male: negative for dysuria, hematuria, decreased urinary stream, erectile dysfunction, urethral discharge  MUSCULOSKELETAL: NEGATIVE for significant arthralgias or myalgia  NEURO: NEGATIVE for weakness, dizziness or paresthesias  PSYCHIATRIC: NEGATIVE for changes in mood or affect    OBJECTIVE:   There were no vitals taken for this visit.    Physical Exam    General Appearance: Pleasant, alert, in no acute respiratory distress.  Head Exam: Normal. Normocephalic, atraumatic. No sinus tenderness.  Eye Exam: Normal external eye, conjunctiva, lids. HARSHAD.  Ear Exam: Normal auditory canals and external ears. Non-tender.  Left TM-normal. Right TM-normal.  OroPharynx Exam: Dental hygiene adequate. Normal buccal mucosa. Normal pharynx.  Neck Exam: Supple, no masses or enlarged, tender nodes.  Thyroid Exam: No nodules or enlargement or tenderness.  Chest/Respiratory Exam: Normal, comfortable, easy respirations. Chest wall  normal. Lungs are clear to auscultation. No wheezing, crackles, or rhonchi.  Cardiovascular Exam: Regular rate and rhythm. No murmur, gallop, or rubs. No pedal edema.  Gastrointestinal Exam: Soft, non-tender, no masses or organomegaly.  Musculoskeletal Exam: Back is non-tender, full ROM of upper and lower extremities.  Skin: no rash, warm and dry.  exxcept as above  Neurologic Exam: Nonfocal, no tremor. Normal gait.  Psychiatric Exam: Alert - appropriate, normal affect          ASSESSMENT/PLAN:   1. Routine history and physical examination of adult      2. Hyperlipidemia LDL goal <160    - simvastatin (ZOCOR) 40 MG tablet; Take 1 tablet (40 mg) by mouth At Bedtime  Dispense: 90 tablet; Refill: 3    3. Hypertension goal BP (blood pressure) < 140/90      4. Prediabetes      5. Need for vaccination    - HC ZOSTER VACCINE RECOMBINANT ADJUVANTED IM NJX  - Pneumococcal vaccine 23 valent PPSV23  (Pneumovax) [[id number]]    Patient has been advised of split billing requirements and indicates understanding: Yes  COUNSELING:   Reviewed preventive health counseling, as reflected in patient instructions       Regular exercise       Healthy diet/nutrition    Estimated body mass index is 35.26 kg/m  as calculated from the following:    Height as of 1/30/20: 1.829 m (6').    Weight as of 1/10/20: 117.9 kg (260 lb).     Weight management plan: Discussed healthy diet and exercise guidelines    He reports that he has never smoked. He has never used smokeless tobacco.      Counseling Resources:  ATP IV Guidelines  Pooled Cohorts Equation Calculator  FRAX Risk Assessment  ICSI Preventive Guidelines  Dietary Guidelines for Americans, 2010  USDA's MyPlate  ASA Prophylaxis  Lung CA Screening    Paulino Roberto MD  [facility]

## 2020-10-14 ENCOUNTER — ALLIED HEALTH/NURSE VISIT (OUTPATIENT)
Dept: NURSING | Facility: CLINIC | Age: 60
End: 2020-10-14
Payer: COMMERCIAL

## 2020-10-14 DIAGNOSIS — Z23 NEED FOR PNEUMOCOCCAL VACCINATION: Primary | ICD-10-CM

## 2020-10-14 PROCEDURE — 90732 PPSV23 VACC 2 YRS+ SUBQ/IM: CPT

## 2020-10-14 PROCEDURE — 99207 PR NO CHARGE NURSE ONLY: CPT

## 2020-10-14 PROCEDURE — 90471 IMMUNIZATION ADMIN: CPT

## 2021-03-16 ENCOUNTER — MYC MEDICAL ADVICE (OUTPATIENT)
Dept: FAMILY MEDICINE | Facility: CLINIC | Age: 61
End: 2021-03-16

## 2021-03-21 ENCOUNTER — MYC MEDICAL ADVICE (OUTPATIENT)
Dept: FAMILY MEDICINE | Facility: CLINIC | Age: 61
End: 2021-03-21

## 2021-06-07 ENCOUNTER — MYC MEDICAL ADVICE (OUTPATIENT)
Dept: FAMILY MEDICINE | Facility: CLINIC | Age: 61
End: 2021-06-07

## 2021-07-28 ENCOUNTER — MYC MEDICAL ADVICE (OUTPATIENT)
Dept: FAMILY MEDICINE | Facility: CLINIC | Age: 61
End: 2021-07-28

## 2021-07-28 DIAGNOSIS — Z12.11 SPECIAL SCREENING FOR MALIGNANT NEOPLASMS, COLON: Primary | ICD-10-CM

## 2021-07-29 NOTE — TELEPHONE ENCOUNTER
JF  Please see [employer] message  Last colonoscopy 7/26/11 - advised to repeat in 10 years.    Pended new order.    Please approve if appropriate  Bhumika Frey RN

## 2021-09-21 ENCOUNTER — OFFICE VISIT (OUTPATIENT)
Dept: FAMILY MEDICINE | Facility: CLINIC | Age: 61
End: 2021-09-21
Payer: COMMERCIAL

## 2021-09-21 VITALS — DIASTOLIC BLOOD PRESSURE: 80 MMHG | SYSTOLIC BLOOD PRESSURE: 134 MMHG

## 2021-09-21 DIAGNOSIS — L57.0 ACTINIC KERATOSIS: ICD-10-CM

## 2021-09-21 DIAGNOSIS — D22.9 MULTIPLE PIGMENTED NEVI: ICD-10-CM

## 2021-09-21 DIAGNOSIS — Z85.828 HISTORY OF BASAL CELL CARCINOMA: ICD-10-CM

## 2021-09-21 DIAGNOSIS — Z12.83 SKIN CANCER SCREENING: Primary | ICD-10-CM

## 2021-09-21 PROCEDURE — 17000 DESTRUCT PREMALG LESION: CPT | Performed by: PHYSICIAN ASSISTANT

## 2021-09-21 PROCEDURE — 17003 DESTRUCT PREMALG LES 2-14: CPT | Performed by: PHYSICIAN ASSISTANT

## 2021-09-21 PROCEDURE — 99203 OFFICE O/P NEW LOW 30 MIN: CPT | Mod: 25 | Performed by: PHYSICIAN ASSISTANT

## 2021-09-21 NOTE — PATIENT INSTRUCTIONS
Proper skin care from [facility]:    -Eliminate harsh soaps as they strip the natural oils from the skin, often resulting in dry itchy skin ( i.e. Dial, Zest, [place] Spring)  -Use mild soaps such as Cetaphil or Dove Sensitive Skin in the shower. You do not need to use soap on arms, legs, and trunk every time you shower unless visibly soiled.   -Avoid hot or cold showers.  -After showering, lightly dry off and apply moisturizing within 2-3 minutes. This will help trap moisture in the skin.   -Aggressive use of a moisturizer at least 1-2 times a day to the entire body (including -Vanicream, Cetaphil, Aquaphor or Cerave) and moisturize hands after every washing.  -We recommend using moisturizers that come in a tub that needs to be scooped out, not a pump. This has more of an oil base. It will hold moisture in your skin much better than a water base moisturizer. The above recommended are non-pore clogging.      Wear a sunscreen with at least SPF 30 on your face, ears, neck and V of the chest daily. Wear sunscreen on other areas of the body if those areas are exposed to the sun throughout the day. Sunscreens can contain physical and/or chemical blockers. Physical blockers are less likely to clog pores, these include zinc oxide and titanium dioxide. Reapply every two hour and after swimming.     Sunscreen examples: https://www.ewg.org/sunscreen/    UV radiation  UVA radiation remains constant throughout the day and throughout the year. It is a longer wavelength than UVB and therefore penetrates deeper into the skin leading to immediate and delayed tanning, photoaging, and skin cancer. 70-80% of UVA and UVB radiation occurs between the hours of 10am-2pm.  UVB radiation  UVB radiation causes the most harmful effects and is more significant during the summer months. However, snow and ice can reflect UVB radiation leading to skin damage during the winter months as well. UVB radiation is responsible for tanning,  burning, inflammation, delayed erythema (pinkness), pigmentation (brown spots), and skin cancer.     I recommend self monthly full body exams and yearly full body exams with a dermatology provider. If you develop a new or changing lesion please follow up for examination. Most skin cancers are pink and scaly or pink and pearly. However, we do see blue/brown/black skin cancers.  Consider the ABCDEs of melanoma when giving yourself your monthly full body exam ( don't forget the groin, buttocks, feet, toes, etc). A-asymmetry, B-borders, C-color, D-diameter, E-elevation or evolving. If you see any of these changes please follow up in clinic. If you cannot see your back I recommend purchasing a hand held mirror to use with a larger wall mirror.          WOUND CARE INSTRUCTIONS  FOR CRYOSURGERY  For questions please call [phone]        This area treated with liquid nitrogen will form a blister. You do not need to bandage the area until after the blister forms and breaks (which may be a few days).  When the blister breaks, begin daily dressing changes as follows:    1) Clean and dry the area with tap water using clean Q-tip or sterile gauze pad.    2) Apply Vaseline or Aquaphor over entire wound. Other options include Polysporin ointment or Bacitracin ointment over entire wound.  Do NOT use Neosporin ointment.    3) Cover the wound with a band-aid or sterile non-stick gauze pad and micropore paper tape.      REPEAT THESE INSTRUCTIONS AT LEAST ONCE A DAY UNTIL THE WOUND HAS COMPLETELY HEALED.        It is an old wives tale that a wound heals better when it is exposed to air and allowed to dry out. The wound will heal faster with a better cosmetic result if it is kept moist with ointment and covered with a bandage.  Do not let the wound dry out.      Supplies Needed:     *Cotton tipped applicators (Q-tips)   *Polysporin ointment or Bacitracin ointment (NOT NEOSPORIN)   *Band-aids, or non stick gauze pads and micropore  paper tape    PATIENT INFORMATION    During the healing process you will notice a number of changes. All wounds develop a small halo of redness surrounding the wound.  This means healing is occurring. Severe itching with extensive redness usually indicates sensitivity to the ointment or bandage tape used to dress the wound.  You should call our office if this develops.      Swelling and/or discoloration around your surgical site is common, particularly when performed around the eye.    All wounds normally drain.  The larger the wound the more drainage there will be.  After 7-10 days, you will notice the wound beginning to shrink and new skin will begin to grow.  The wound is healed when you can see skin has formed over the entire area.  A healed wound has a healthy, shiny look to the surface and is red to dark pink in color to normalize.  Wounds may take approximately 4-6 weeks to heal.  Larger wounds may take 6-8 weeks.  After the wound is healed you may discontinue dressing changes.    You may experience a sensation of tightness as your wound heals. This is normal and will gradually subside.    Your healed wound may be sensitive to temperature changes. This sensitivity improves with time, but if you re having a lot of discomfort, try to avoid temperature extremes.    Patients frequently experience itching after their wound appears to have healed because of the continue healing under the skin.  Plain Vaseline will help relieve the itching.

## 2021-09-21 NOTE — LETTER
9/21/2021         RE: Elliot Norris  [address]  [facility] [address]        Dear Colleague,    Thank you for referring your patient, Elliot Norris, to the [facility] [place]. Please see a copy of my visit note below.    [facility] Dermatology Note  Encounter Date: Sep 21, 2021  Office Visit     Dermatology Problem List:  1. Skin cancer screening 9/21/2021-   basal cell carcinoma, actinic keratoses, mild dysplastic nevus(i) on the left abdomen and back midline at T4  ____________________________________________    Assessment & Plan:     #Skin cancer screening with multiple solar lentigines, multiple benign nevi with history of dysplastic nevi  - ABCDEs: Counseled ABCDEs of melanoma: Asymmetry, Border (irregularity), Color (not uniform, changes in color), Diameter (greater than 6 mm which is about the size of a pencil eraser), and Evolving (any changes in preexisting moles).   - Sun precaution was advised including the use of sun screens of SPF 30 or higher, sun protective clothing, and avoidance of tanning beds.    # History of basal cell carcinoma on the scalp, left arm and face  -Continue annual screenings    #Actinic keratoses on the scalp x 14  -We will treat today with cryotherapy.  -The states he still has many  areas on his scalp will treat with field therapy at follow-up 6 months      Procedures Performed:   - Cryotherapy procedure note, location(s): scalp. After verbal consent and discussion of risks and benefits including, but not limited to, dyspigmentation/scar, blister, and pain, 14 lesion(s) was(were) treated with 1-2 mm freeze border for 1-2 cycles with liquid nitrogen. Post cryotherapy instructions were provided.    Follow-up: 6 months in-person to recheck the scalp, or earlier for new or changing lesions    Staff:     All risks, benefits and alternatives were discussed with patient.  Patient is in agreement and understands the  assessment and plan.  All questions were answered.  Sun Screen Education was given.   Return to Clinic in 6 months or sooner as needed.   Delores Stout PA-C   ____________________________________________    [place] [place]    [place]PI:  Mr. lEliot Norris is a(n) 61 year old male who presents today as a new patient for a skin cancer screening.  He is followed primary care in the past and had actinic keratoses treated with cryotherapy.  His last visit in 2019  8 actinic keratoses treated with cryotherapy on his scalp.    He denies any spots that are painful, bleeding, itchy or changing.    Patient is otherwise feeling well, without additional skin concerns.     Labs Reviewed:  N/A    Physical Exam:  Vitals: /80   SKIN: Full skin, which includes the head/face, both arms, chest, back, abdomen,both legs, genitalia and/or groin buttocks, digits and/or nails, was examined. Significant for:     - There are erythematous macules with overyling adherent scale on the scalp x 14.   Multiple regular brown pigmented macules and papules are identified on the trunk and extremities.   There is no erythema, telangectasias, nodularity, or pigmentation on the scars on the scalp, left arm and face.  Scattered brown macules on sun exposed areas..   - No other lesions of concern on areas examined.     Medications:  Current Outpatient Medications   Medication     diphenhydrAMINE (BENADRYL) 25 MG tablet     simvastatin (ZOCOR) 40 MG tablet     hydrocortisone (CORTAID) 0.5 % external ointment     No current facility-administered medications for this visit.      Past Medical History:   Patient Active Problem List   Diagnosis     ED (erectile dysfunction)     Hyperlipidemia LDL goal <160     Fatty liver     Insomnia     Obesity     Hypertension goal BP (blood pressure) < 140/90     Prediabetes     History of basal cell carcinoma     Scar     No past medical history on file.    CC No referring provider defined for this encounter.  on close of this encounter.          Again, thank you for allowing me to participate in the care of your patient.        Sincerely,        Delores Stout PA-C

## 2021-09-21 NOTE — PROGRESS NOTES
[facility] Dermatology Note  Encounter Date: Sep 21, 2021  Office Visit     Dermatology Problem List:  1. Skin cancer screening 9/21/2021-   basal cell carcinoma, actinic keratoses, mild dysplastic nevus(i) on the left abdomen and back midline at T4  ____________________________________________    Assessment & Plan:     #Skin cancer screening with multiple solar lentigines, multiple benign nevi with history of dysplastic nevi  - ABCDEs: Counseled ABCDEs of melanoma: Asymmetry, Border (irregularity), Color (not uniform, changes in color), Diameter (greater than 6 mm which is about the size of a pencil eraser), and Evolving (any changes in preexisting moles).   - Sun precaution was advised including the use of sun screens of SPF 30 or higher, sun protective clothing, and avoidance of tanning beds.    # History of basal cell carcinoma on the scalp, left arm and face  -Continue annual screenings    #Actinic keratoses on the scalp x 14  -We will treat today with cryotherapy.  -The states he still has many  areas on his scalp will treat with field therapy at follow-up 6 months      Procedures Performed:   - Cryotherapy procedure note, location(s): scalp. After verbal consent and discussion of risks and benefits including, but not limited to, dyspigmentation/scar, blister, and pain, 14 lesion(s) was(were) treated with 1-2 mm freeze border for 1-2 cycles with liquid nitrogen. Post cryotherapy instructions were provided.    Follow-up: 6 months in-person to recheck the scalp, or earlier for new or changing lesions    Staff:     All risks, benefits and alternatives were discussed with patient.  Patient is in agreement and understands the assessment and plan.  All questions were answered.  Sun Screen Education was given.   Return to Clinic in 6 months or sooner as needed.   Delores Stout PA-C   ____________________________________________    CC: Derm Problem [place] [place]    HPI:  Mr. Elliot SAEED Jr is a(n)  61 year old male who presents today as a new patient for a skin cancer screening.  He is followed primary care in the past and had actinic keratoses treated with cryotherapy.  His last visit in 2019  8 actinic keratoses treated with cryotherapy on his scalp.    He denies any spots that are painful, bleeding, itchy or changing.    Patient is otherwise feeling well, without additional skin concerns.     Labs Reviewed:  N/A    Physical Exam:  Vitals: /80   SKIN: Full skin, which includes the head/face, both arms, chest, back, abdomen,both legs, genitalia and/or groin buttocks, digits and/or nails, was examined. Significant for:     - There are erythematous macules with overyling adherent scale on the scalp x 14.   Multiple regular brown pigmented macules and papules are identified on the trunk and extremities.   There is no erythema, telangectasias, nodularity, or pigmentation on the scars on the scalp, left arm and face.  Scattered brown macules on sun exposed areas..   - No other lesions of concern on areas examined.     Medications:  Current Outpatient Medications   Medication     diphenhydrAMINE (BENADRYL) 25 MG tablet     simvastatin (ZOCOR) 40 MG tablet     hydrocortisone (CORTAID) 0.5 % external ointment     No current facility-administered medications for this visit.      Past Medical History:   Patient Active Problem List   Diagnosis     ED (erectile dysfunction)     Hyperlipidemia LDL goal <160     Fatty liver     Insomnia     Obesity     Hypertension goal BP (blood pressure) < 140/90     Prediabetes     History of basal cell carcinoma     Scar     No past medical history on file.    CC No referring provider defined for this encounter. on close of this encounter.

## 2021-10-02 ENCOUNTER — HEALTH MAINTENANCE LETTER (OUTPATIENT)
Age: 61
End: 2021-10-02

## 2021-11-03 ENCOUNTER — MYC MEDICAL ADVICE (OUTPATIENT)
Dept: FAMILY MEDICINE | Facility: CLINIC | Age: 61
End: 2021-11-03

## 2021-11-03 ENCOUNTER — MYC MEDICAL ADVICE (OUTPATIENT)
Dept: FAMILY MEDICINE | Facility: CLINIC | Age: 61
End: 2021-11-03
Payer: COMMERCIAL

## 2021-11-09 ENCOUNTER — LAB (OUTPATIENT)
Dept: LAB | Facility: CLINIC | Age: 61
End: 2021-11-09
Payer: COMMERCIAL

## 2021-11-09 DIAGNOSIS — R73.03 PREDIABETES: ICD-10-CM

## 2021-11-09 DIAGNOSIS — I10 HYPERTENSION GOAL BP (BLOOD PRESSURE) < 140/90: ICD-10-CM

## 2021-11-09 DIAGNOSIS — E78.5 HYPERLIPIDEMIA LDL GOAL <160: ICD-10-CM

## 2021-11-09 LAB
ALBUMIN SERPL-MCNC: 4.3 G/DL (ref 3.4–5)
ALP SERPL-CCNC: 62 U/L (ref 40–150)
ALT SERPL W P-5'-P-CCNC: 35 U/L (ref 0–70)
ANION GAP SERPL CALCULATED.3IONS-SCNC: 8 MMOL/L (ref 3–14)
AST SERPL W P-5'-P-CCNC: 19 U/L (ref 0–45)
BILIRUB SERPL-MCNC: 0.5 MG/DL (ref 0.2–1.3)
BUN SERPL-MCNC: 11 MG/DL (ref 7–30)
CALCIUM SERPL-MCNC: 9.3 MG/DL (ref 8.5–10.1)
CHLORIDE BLD-SCNC: 106 MMOL/L (ref 94–109)
CHOLEST SERPL-MCNC: 211 MG/DL
CO2 SERPL-SCNC: 24 MMOL/L (ref 20–32)
CREAT SERPL-MCNC: 1.01 MG/DL (ref 0.66–1.25)
FASTING STATUS PATIENT QL REPORTED: YES
GFR SERPL CREATININE-BSD FRML MDRD: 80 ML/MIN/1.73M2
GLUCOSE BLD-MCNC: 115 MG/DL (ref 70–99)
HBA1C MFR BLD: 5.9 % (ref 0–5.6)
HDLC SERPL-MCNC: 40 MG/DL
LDLC SERPL CALC-MCNC: 125 MG/DL
NONHDLC SERPL-MCNC: 171 MG/DL
POTASSIUM BLD-SCNC: 3.9 MMOL/L (ref 3.4–5.3)
PROT SERPL-MCNC: 7.8 G/DL (ref 6.8–8.8)
SODIUM SERPL-SCNC: 138 MMOL/L (ref 133–144)
TRIGL SERPL-MCNC: 230 MG/DL

## 2021-11-09 PROCEDURE — 80061 LIPID PANEL: CPT

## 2021-11-09 PROCEDURE — 80053 COMPREHEN METABOLIC PANEL: CPT

## 2021-11-09 PROCEDURE — 83036 HEMOGLOBIN GLYCOSYLATED A1C: CPT

## 2021-11-09 PROCEDURE — 36415 COLL VENOUS BLD VENIPUNCTURE: CPT

## 2021-11-10 ENCOUNTER — MYC MEDICAL ADVICE (OUTPATIENT)
Dept: FAMILY MEDICINE | Facility: CLINIC | Age: 61
End: 2021-11-10
Payer: COMMERCIAL

## 2021-11-23 DIAGNOSIS — Z11.59 ENCOUNTER FOR SCREENING FOR OTHER VIRAL DISEASES: ICD-10-CM

## 2021-11-27 ENCOUNTER — HEALTH MAINTENANCE LETTER (OUTPATIENT)
Age: 61
End: 2021-11-27

## 2021-12-10 ASSESSMENT — ENCOUNTER SYMPTOMS
FEVER: 0
ARTHRALGIAS: 0
SHORTNESS OF BREATH: 0
EYE PAIN: 0
ABDOMINAL PAIN: 0
WEAKNESS: 0
COUGH: 0
HEMATURIA: 0
PALPITATIONS: 0
MYALGIAS: 0
SORE THROAT: 0
CONSTIPATION: 0
PARESTHESIAS: 0
NERVOUS/ANXIOUS: 0
DIARRHEA: 0
CHILLS: 0
NAUSEA: 0
HEMATOCHEZIA: 0
DIZZINESS: 0
HEADACHES: 0
JOINT SWELLING: 0
HEARTBURN: 0
FREQUENCY: 0
DYSURIA: 0

## 2021-12-13 ENCOUNTER — OFFICE VISIT (OUTPATIENT)
Dept: FAMILY MEDICINE | Facility: CLINIC | Age: 61
End: 2021-12-13
Attending: SPECIALIST
Payer: COMMERCIAL

## 2021-12-13 VITALS
DIASTOLIC BLOOD PRESSURE: 92 MMHG | HEART RATE: 80 BPM | TEMPERATURE: 98.5 F | BODY MASS INDEX: 33.69 KG/M2 | SYSTOLIC BLOOD PRESSURE: 155 MMHG | WEIGHT: 248.7 LBS | OXYGEN SATURATION: 99 % | HEIGHT: 72 IN

## 2021-12-13 DIAGNOSIS — I10 HYPERTENSION GOAL BP (BLOOD PRESSURE) < 140/90: ICD-10-CM

## 2021-12-13 DIAGNOSIS — J98.01 ACUTE BRONCHOSPASM: ICD-10-CM

## 2021-12-13 DIAGNOSIS — Z00.00 ROUTINE GENERAL MEDICAL EXAMINATION AT A HEALTH CARE FACILITY: Primary | ICD-10-CM

## 2021-12-13 DIAGNOSIS — J20.9 ACUTE BRONCHITIS WITH SYMPTOMS > 10 DAYS: ICD-10-CM

## 2021-12-13 DIAGNOSIS — N52.9 ERECTILE DYSFUNCTION, UNSPECIFIED ERECTILE DYSFUNCTION TYPE: ICD-10-CM

## 2021-12-13 DIAGNOSIS — E78.5 HYPERLIPIDEMIA LDL GOAL <160: ICD-10-CM

## 2021-12-13 DIAGNOSIS — Z23 NEED FOR VACCINATION: ICD-10-CM

## 2021-12-13 DIAGNOSIS — Z11.59 ENCOUNTER FOR SCREENING FOR OTHER VIRAL DISEASES: ICD-10-CM

## 2021-12-13 PROCEDURE — U0003 INFECTIOUS AGENT DETECTION BY NUCLEIC ACID (DNA OR RNA); SEVERE ACUTE RESPIRATORY SYNDROME CORONAVIRUS 2 (SARS-COV-2) (CORONAVIRUS DISEASE [COVID-19]), AMPLIFIED PROBE TECHNIQUE, MAKING USE OF HIGH THROUGHPUT TECHNOLOGIES AS DESCRIBED BY CMS-2020-01-R: HCPCS | Performed by: FAMILY MEDICINE

## 2021-12-13 PROCEDURE — 90715 TDAP VACCINE 7 YRS/> IM: CPT | Performed by: FAMILY MEDICINE

## 2021-12-13 PROCEDURE — 90471 IMMUNIZATION ADMIN: CPT | Performed by: FAMILY MEDICINE

## 2021-12-13 PROCEDURE — 99396 PREV VISIT EST AGE 40-64: CPT | Mod: 25 | Performed by: FAMILY MEDICINE

## 2021-12-13 PROCEDURE — U0005 INFEC AGEN DETEC AMPLI PROBE: HCPCS | Performed by: FAMILY MEDICINE

## 2021-12-13 RX ORDER — ALBUTEROL SULFATE 90 UG/1
2 AEROSOL, METERED RESPIRATORY (INHALATION) EVERY 4 HOURS PRN
Qty: 8.5 G | Refills: 0 | Status: SHIPPED | OUTPATIENT
Start: 2021-12-13 | End: 2022-04-12

## 2021-12-13 RX ORDER — ATORVASTATIN CALCIUM 20 MG/1
20 TABLET, FILM COATED ORAL DAILY
Qty: 90 TABLET | Refills: 3 | Status: ON HOLD | OUTPATIENT
Start: 2021-12-13 | End: 2021-12-16

## 2021-12-13 RX ORDER — AMLODIPINE BESYLATE 5 MG/1
5 TABLET ORAL DAILY
Qty: 90 TABLET | Refills: 3 | Status: SHIPPED | OUTPATIENT
Start: 2021-12-13 | End: 2022-10-11

## 2021-12-13 RX ORDER — SILDENAFIL CITRATE 20 MG/1
TABLET ORAL
Qty: 50 TABLET | Refills: 11 | Status: SHIPPED | OUTPATIENT
Start: 2021-12-13 | End: 2022-04-12

## 2021-12-13 RX ORDER — ATORVASTATIN CALCIUM 20 MG/1
20 TABLET, FILM COATED ORAL DAILY
Qty: 90 TABLET | Refills: 3 | Status: SHIPPED | OUTPATIENT
Start: 2021-12-13 | End: 2022-12-12

## 2021-12-13 ASSESSMENT — ENCOUNTER SYMPTOMS
HEARTBURN: 0
FREQUENCY: 0
DYSURIA: 0
HEMATOCHEZIA: 0
CHILLS: 0
COUGH: 0
WEAKNESS: 0
ABDOMINAL PAIN: 0
PARESTHESIAS: 0
HEADACHES: 0
JOINT SWELLING: 0
NAUSEA: 0
EYE PAIN: 0
HEMATURIA: 0
PALPITATIONS: 0
DIARRHEA: 0
SORE THROAT: 0
NERVOUS/ANXIOUS: 0
ARTHRALGIAS: 0
DIZZINESS: 0
MYALGIAS: 0
SHORTNESS OF BREATH: 0
CONSTIPATION: 0
FEVER: 0

## 2021-12-13 ASSESSMENT — MIFFLIN-ST. JEOR: SCORE: 1971.1

## 2021-12-13 NOTE — NURSING NOTE
Prior to immunization administration, verified patients identity using patient s name and date of birth. Please see Immunization Activity for additional information.     Screening Questionnaire for Adult Immunization    Are you sick today?   No   Do you have allergies to medications, food, a vaccine component or latex?   No   Have you ever had a serious reaction after receiving a vaccination?   No   Do you have a long-term health problem with heart, lung, kidney, or metabolic disease (e.g., diabetes), asthma, a blood disorder, no spleen, complement component deficiency, a cochlear implant, or a spinal fluid leak?  Are you on long-term aspirin therapy?   No   Do you have cancer, leukemia, HIV/AIDS, or any other immune system problem?   No   Do you have a parent, brother, or sister with an immune system problem?   No   In the past 3 months, have you taken medications that affect  your immune system, such as prednisone, other steroids, or anticancer drugs; drugs for the treatment of rheumatoid arthritis, Crohn s disease, or psoriasis; or have you had radiation treatments?   No   Have you had a seizure, or a brain or other nervous system problem?   No   During the past year, have you received a transfusion of blood or blood    products, or been given immune (gamma) globulin or antiviral drug?   No   For women: Are you pregnant or is there a chance you could become       pregnant during the next month?   No   Have you received any vaccinations in the past 4 weeks?   No     Immunization questionnaire answers were all negative.        Per orders of Dr. Roberto, injection of Adacel given by Maribell Corado MA. Patient instructed to remain in clinic for 15 minutes afterwards, and to report any adverse reaction to me immediately.       Screening performed by Maribell Corado MA on 12/13/2021 at 2:00 PM.  Maribell Corado MA on 12/13/2021 at 2:00 PM

## 2021-12-14 LAB — SARS-COV-2 RNA RESP QL NAA+PROBE: NEGATIVE

## 2021-12-16 ENCOUNTER — HOSPITAL ENCOUNTER (OUTPATIENT)
Facility: CLINIC | Age: 61
Discharge: HOME | End: 2021-12-16
Attending: SPECIALIST | Admitting: SPECIALIST
Payer: COMMERCIAL

## 2021-12-16 VITALS
HEART RATE: 68 BPM | OXYGEN SATURATION: 99 % | DIASTOLIC BLOOD PRESSURE: 93 MMHG | RESPIRATION RATE: 29 BRPM | SYSTOLIC BLOOD PRESSURE: 147 MMHG

## 2021-12-16 LAB — COLONOSCOPY: NORMAL

## 2021-12-16 PROCEDURE — 99153 MOD SED SAME PHYS/QHP EA: CPT | Performed by: SPECIALIST

## 2021-12-16 PROCEDURE — 45378 DIAGNOSTIC COLONOSCOPY: CPT | Performed by: SPECIALIST

## 2021-12-16 PROCEDURE — G0500 MOD SEDAT ENDO SERVICE >5YRS: HCPCS | Performed by: SPECIALIST

## 2021-12-16 PROCEDURE — 250N000011 HC RX IP 250 OP 636: Performed by: SPECIALIST

## 2021-12-16 PROCEDURE — G0121 COLON CA SCRN NOT HI RSK IND: HCPCS | Performed by: SPECIALIST

## 2021-12-16 RX ORDER — FENTANYL CITRATE 50 UG/ML
INJECTION, SOLUTION INTRAMUSCULAR; INTRAVENOUS PRN
Status: COMPLETED | OUTPATIENT
Start: 2021-12-16 | End: 2021-12-16

## 2021-12-16 RX ORDER — ONDANSETRON 2 MG/ML
4 INJECTION INTRAMUSCULAR; INTRAVENOUS
Status: DISCONTINUED | OUTPATIENT
Start: 2021-12-16 | End: 2021-12-21 | Stop reason: HOSPADM

## 2021-12-16 RX ORDER — LIDOCAINE 40 MG/G
CREAM TOPICAL
Status: DISCONTINUED | OUTPATIENT
Start: 2021-12-16 | End: 2021-12-21 | Stop reason: HOSPADM

## 2021-12-16 RX ADMIN — MIDAZOLAM 1 MG: 1 INJECTION INTRAMUSCULAR; INTRAVENOUS at 11:12

## 2021-12-16 RX ADMIN — FENTANYL CITRATE 50 MCG: 50 INJECTION, SOLUTION INTRAMUSCULAR; INTRAVENOUS at 11:11

## 2021-12-16 NOTE — H&P
Pre-Endoscopy History and Physical     Elliot Norris MRN# [id number]   YOB: 1960 Age: 61 year old     Date of Procedure: 12/16/2021  Primary care provider: Paulino Roberto  Type of Endoscopy: colonoscopy  Reason for Procedure: screening  Type of Anesthesia Anticipated: Moderate sedation    HPI:    Elliot is a 61 year old male who will be undergoing the above procedure.      A history and physical has been performed. The patient's medications and allergies have been reviewed. The risks and benefits of the procedure and the sedation options and risks were discussed with the patient.  All questions were answered and informed consent was obtained.      He denies a personal or family history of anesthesia complications or bleeding disorders.     Allergies   Allergen Reactions     Seasonal Allergies         Current Facility-Administered Medications   Medication     lidocaine (LMX4) cream     lidocaine 1 % 0.1-1 mL     ondansetron (ZOFRAN) injection 4 mg     sodium chloride (PF) 0.9% PF flush 3 mL     sodium chloride (PF) 0.9% PF flush 3 mL       Patient Active Problem List   Diagnosis     ED (erectile dysfunction)     Hyperlipidemia LDL goal <160     Fatty liver     Insomnia     Obesity     Hypertension goal BP (blood pressure) < 140/90     Prediabetes     History of basal cell carcinoma     Scar        Past Medical History:   Diagnosis Date     Arthritis 2016    A little bit.     Cancer (H) 2009    Skin.     Depressive disorder     Occasionally, mild.     Hypertension 2016    About a year.        Past Surgical History:   Procedure Laterality Date     APPENDECTOMY  1973     BIOPSY      Nose, skin.     COLONOSCOPY  2011     MOHS MICROGRAPHIC PROCEDURE  2009       Social History     Tobacco Use     Smoking status: Never Smoker     Smokeless tobacco: Never Used   Substance Use Topics     Alcohol use: Yes     Comment: 3 drinks per week       Family History   Problem Relation Age of Onset      Diabetes Mother      Arthritis Mother      Gynecology Mother      Pancreatic Cancer Mother      Obesity Mother      Diabetes Father      Hypertension Father      Alcohol/Drug Father      Circulatory Father      Depression Father      Heart Disease Father      Lipids Father      Cerebrovascular Disease Father          age 78     Anxiety Disorder Father      Substance Abuse Father      Obesity Father      Diabetes Maternal Grandmother      Cerebrovascular Disease Maternal Grandfather      Prostate Cancer Maternal Grandfather      Prostate Cancer Paternal Grandfather      Depression Paternal Grandmother      Depression Sister      Depression Sister        REVIEW OF SYSTEMS:   5 point ROS negative except as noted above in HPI, including Gen., Resp., CV, GI &  system review.    PHYSICAL EXAM:   BP (!) 158/101   Pulse 70   Resp 14   SpO2 100%  Estimated body mass index is 33.73 kg/m  as calculated from the following:    Height as of 21: 1.829 m (6').    Weight as of 21: 112.8 kg (248 lb 11.2 oz).   GENERAL APPEARANCE: healthy  MENTAL STATUS: alert or interactive  AIRWAY EXAM: Mallampatti Class II (visualization of the soft palate, fauces, and uvula)  RESP: lungs clear to auscultation - no rales, rhonchi or wheezes  CV: regular rates and rhythm, normal S1 S2, no S3 or S4 and no murmur, click or rub    DIAGNOSTICS:    Not indicated    IMPRESSION   ASA Class 2 - Mild systemic disease    PLAN:   colonoscopy    The above has been forwarded to the consulting provider.      Signed Electronically by: Arturo Townsend MD  2021

## 2021-12-29 ENCOUNTER — DOCUMENTATION ONLY (OUTPATIENT)
Dept: OTHER | Facility: CLINIC | Age: 61
End: 2021-12-29
Payer: COMMERCIAL

## 2022-04-12 ENCOUNTER — OFFICE VISIT (OUTPATIENT)
Dept: FAMILY MEDICINE | Facility: CLINIC | Age: 62
End: 2022-04-12
Payer: COMMERCIAL

## 2022-04-12 VITALS — SYSTOLIC BLOOD PRESSURE: 142 MMHG | DIASTOLIC BLOOD PRESSURE: 82 MMHG

## 2022-04-12 DIAGNOSIS — L82.1 SEBORRHEIC KERATOSIS: ICD-10-CM

## 2022-04-12 DIAGNOSIS — L57.0 ACTINIC KERATOSIS: Primary | ICD-10-CM

## 2022-04-12 PROCEDURE — 17003 DESTRUCT PREMALG LES 2-14: CPT | Performed by: PHYSICIAN ASSISTANT

## 2022-04-12 PROCEDURE — 99213 OFFICE O/P EST LOW 20 MIN: CPT | Mod: 25 | Performed by: PHYSICIAN ASSISTANT

## 2022-04-12 PROCEDURE — 17000 DESTRUCT PREMALG LESION: CPT | Performed by: PHYSICIAN ASSISTANT

## 2022-04-12 NOTE — LETTER
4/12/2022         RE: Elliot Norris  [address]  [facility] [address]        Dear Colleague,    Thank you for referring your patient, Elliot Norris, to the [facility] [place]. Please see a copy of my visit note below.    [facility] Dermatology Note  Encounter Date: Apr 12, 2022  Office Visit     Dermatology Problem List:  0. Skin cancer screening 9/21/21  1. History of BCC, AK, mildly dysplastic nevus  ____________________________________________    Assessment & Plan:    # Actinic keratoses - left temple x1, scalp x3   - Cryotherapy performed today, see note below    # Seborrheic keratosis, left forearm  Discussed the natural history and benign nature of this lesion. Reassurance provided that no additional treatment is necessary.      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  Discussed pre-treating with Vaseline or Aquaphor prior to swimming. Advised the patient wear goggles if submerging his head.    Procedures Performed:   - Cryotherapy procedure note, location(s): see above. After verbal consent and discussion of risks and benefits including, but not limited to, dyspigmentation/scar, blister, and pain, 4 AKs were treated with 1-2 mm freeze border for 1-2 cycles with liquid nitrogen. Post cryotherapy instructions were provided.    Follow-up: 6 month(s) in-person, or earlier for new or changing lesions for full skin check,     Staff and Scribe:     Scribe Disclosure:  I, Mohini Pandya, am serving as a scribe to document services personally performed by Delores Stout PA-C based on data collection and the provider's statements to me.     Provider Disclosure:   The documentation recorded by the scribe accurately reflects the services I personally performed and the decisions made by me.    All risks, benefits and alternatives were discussed with patient.  Patient is in agreement and understands the  assessment and plan.  All questions were answered.  Sun Screen Education was given.   Return to Clinic in 6 months or sooner as needed.   Delores Stout PA-C   [facility]   ____________________________________________    CC: Derm Problem (Scalp-- cryo )    HPI:  Mr. Elliot Norris is a(n) 61 year old male who presents today as a return patient for AK follow up. The patient was last seen in dermatology on 9/21/21 by myself, at which time 14 AKs on the scalp were treated with Ln2.     Today, the patient makes note of a spot on his left forearm which is very itchy. It is near the site of his prior BCC, so he is concerned it could be recurrence. He also makes note of a spot on the left temple which is very itchy as well. Patient states that he sometimes scrapes this spot off with his finger nails.    Patient is otherwise feeling well, without additional skin concerns.    Labs Reviewed:  N/A    Physical Exam:  Vitals: BP (!) 142/82   SKIN: Focused examination of the scalp and left forearm was performed.  - There is an erythematous macule with overlying adherent scale on the left temple x1, scalp x3.  - Mild xerosis to the upper eyelids.  - There is a waxy stuck on tan to brown papule on the left distal forearm.   - No other lesions of concern on areas examined.     Medications:  Current Outpatient Medications   Medication     amLODIPine (NORVASC) 5 MG tablet     atorvastatin (LIPITOR) 20 MG tablet     albuterol (VENTOLIN HFA) 108 (90 Base) MCG/ACT inhaler     diphenhydrAMINE (BENADRYL) 25 MG tablet     sildenafil (REVATIO) 20 MG tablet     No current facility-administered medications for this visit.      Past Medical History:   Patient Active Problem List   Diagnosis     ED (erectile dysfunction)     Hyperlipidemia LDL goal <160     Fatty liver     Insomnia     Obesity     Hypertension goal BP (blood pressure) < 140/90     Prediabetes     History of basal cell carcinoma     Scar      Past Medical History:   Diagnosis Date     Arthritis 2016    A little bit.     Cancer (H) 2009    Skin.     Depressive disorder     Occasionally, mild.     Hypertension 2016    About a year.           Again, thank you for allowing me to participate in the care of your patient.        Sincerely,        Delores Stout PA-C

## 2022-04-12 NOTE — PATIENT INSTRUCTIONS
Cryotherapy    What is it?  Use of a very cold liquid, such as liquid nitrogen, to freeze and destroy abnormal skin cells that need to be removed    What should I expect?  Tenderness and redness  A small blister that might grow and fill with dark purple blood. There may be crusting.  More than one treatment may be needed if the lesions do not go away.    How do I care for the treated area?  Gently wash the area with your hands when bathing.  Use a thin layer of Vaseline to help with healing. You may use a Band-Aid.   The area should heal within 7-10 days and may leave behind a pink or lighter color.   Do not use an antibiotic or Neosporin ointment.   You may take acetaminophen (Tylenol) for pain.     Call your doctor if you have:  Severe pain  Signs of infection (warmth, redness, cloudy yellow drainage, and or a bad smell)  Questions or concerns    Who should I call with questions?      [facility]: [phone]      [facility]: [phone]      For urgent needs outside of business hours call the [facility] at [phone] and ask for the dermatology resident on call

## 2022-04-12 NOTE — PROGRESS NOTES
[facility] Dermatology Note  Encounter Date: Apr 12, 2022  Office Visit     Dermatology Problem List:  0. Skin cancer screening 9/21/21  1. History of BCC, AK, mildly dysplastic nevus  ____________________________________________    Assessment & Plan:    # Actinic keratoses - left temple x1, scalp x3   - Cryotherapy performed today, see note below    # Seborrheic keratosis, left forearm  Discussed the natural history and benign nature of this lesion. Reassurance provided that no additional treatment is necessary.      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  Discussed pre-treating with Vaseline or Aquaphor prior to swimming. Advised the patient wear goggles if submerging his head.    Procedures Performed:   - Cryotherapy procedure note, location(s): see above. After verbal consent and discussion of risks and benefits including, but not limited to, dyspigmentation/scar, blister, and pain, 4 AKs were treated with 1-2 mm freeze border for 1-2 cycles with liquid nitrogen. Post cryotherapy instructions were provided.    Follow-up: 6 month(s) in-person, or earlier for new or changing lesions for full skin check,     Staff and Scribe:     Scribe Disclosure:  I, Mohini Pandya, am serving as a scribe to document services personally performed by Delores Stout PA-C based on data collection and the provider's statements to me.     Provider Disclosure:   The documentation recorded by the scribe accurately reflects the services I personally performed and the decisions made by me.    All risks, benefits and alternatives were discussed with patient.  Patient is in agreement and understands the assessment and plan.  All questions were answered.  Sun Screen Education was given.   Return to Clinic in 6 months or sooner as needed.   Delores Stout PA-C   [facility]   ____________________________________________    CC: Derm Problem  (Scalp-- cryo )    HPI:  Mr. Elliot Norris is a(n) 61 year old male who presents today as a return patient for AK follow up. The patient was last seen in dermatology on 9/21/21 by myself, at which time 14 AKs on the scalp were treated with Ln2.     Today, the patient makes note of a spot on his left forearm which is very itchy. It is near the site of his prior BCC, so he is concerned it could be recurrence. He also makes note of a spot on the left temple which is very itchy as well. Patient states that he sometimes scrapes this spot off with his finger nails.    Patient is otherwise feeling well, without additional skin concerns.    Labs Reviewed:  N/A    Physical Exam:  Vitals: BP (!) 142/82   SKIN: Focused examination of the scalp and left forearm was performed.  - There is an erythematous macule with overlying adherent scale on the left temple x1, scalp x3.  - Mild xerosis to the upper eyelids.  - There is a waxy stuck on tan to brown papule on the left distal forearm.   - No other lesions of concern on areas examined.     Medications:  Current Outpatient Medications   Medication     amLODIPine (NORVASC) 5 MG tablet     atorvastatin (LIPITOR) 20 MG tablet     albuterol (VENTOLIN HFA) 108 (90 Base) MCG/ACT inhaler     diphenhydrAMINE (BENADRYL) 25 MG tablet     sildenafil (REVATIO) 20 MG tablet     No current facility-administered medications for this visit.      Past Medical History:   Patient Active Problem List   Diagnosis     ED (erectile dysfunction)     Hyperlipidemia LDL goal <160     Fatty liver     Insomnia     Obesity     Hypertension goal BP (blood pressure) < 140/90     Prediabetes     History of basal cell carcinoma     Scar     Past Medical History:   Diagnosis Date     Arthritis 2016    A little bit.     Cancer (H) 2009    Skin.     Depressive disorder     Occasionally, mild.     Hypertension 2016    About a year.

## 2022-05-10 ENCOUNTER — MYC MEDICAL ADVICE (OUTPATIENT)
Dept: FAMILY MEDICINE | Facility: CLINIC | Age: 62
End: 2022-05-10
Payer: COMMERCIAL

## 2022-05-10 ENCOUNTER — TELEPHONE (OUTPATIENT)
Dept: FAMILY MEDICINE | Facility: CLINIC | Age: 62
End: 2022-05-10

## 2022-05-10 DIAGNOSIS — H90.3 BILATERAL SENSORINEURAL HEARING LOSS: Primary | ICD-10-CM

## 2022-05-10 NOTE — TELEPHONE ENCOUNTER
Reason for Call: Request for an order or referral:    Order or referral being requested:  Patient is requesting a referral to Audiology [facility], [address], [address]/ [place].  Their phone is [phone].  Patient does not feel like his hearing is that bad but patients partner is complaining about his hearing.  Please call patient once the referral has been placed.      Date needed: as soon as possible    Has the patient been seen by the PCP for this problem? YES    Additional comments:     Phone number Patient can be reached at:  Home number on file [phone] (home)    Best Time:  any    Can we leave a detailed message on this number?  YES    Call taken on 5/10/2022 at 8:57 AM by Zamzam Caldwell

## 2022-05-10 NOTE — TELEPHONE ENCOUNTER
Routing to PCP-     Last saw pt 12/13/21 for annual exam    Referral pending to location if appropriate    Estefani Diane RN

## 2022-09-03 ENCOUNTER — HEALTH MAINTENANCE LETTER (OUTPATIENT)
Age: 62
End: 2022-09-03

## 2022-10-10 NOTE — PROGRESS NOTES
[facility] Dermatology Note  Encounter Date: Oct 11, 2022  Office Visit     Dermatology Problem List:  1. History of NMSC  - BCC, left forehead s/p MMS 9/14/2017  - BCC, left forearm s/p MMSM 9/14/2017  2. History of atypical nevus  3. AKs s/p cryotherapy    Last FBSE: 10/11/2022  ____________________________________________    Assessment & Plan:    # AKs, scalp x5, left temple x2   # ISK, left frontal scalp x1  - Cryotherapy today; see procedure note below.    # Solar lentigines  # Multiple benign nevi  - No concerning lesions today  - Monitor for ABCDEs of melanoma   - Continue sun protection - recommend SPF 30 or higher with frequent application   - Return sooner if noticing changing or symptomatic lesions    # Cherry angiomas  # Seborrheic keratoses  # Acrochordons  Discussed the natural history and benign nature of this lesion. Reassurance provided that no additional treatment is necessary.     # History of NMSC. No evidence of recurrence.  # History of atypical nevus  - Continue skin checks every six months.    Procedures Performed:   - Cryotherapy procedure note, location(s): see above. After verbal consent and discussion of risks and benefits including, but not limited to, dyspigmentation/scar, blister, and pain, 8 lesion(s) was(were) treated with 1-2 mm freeze border for 1-2 cycles with liquid nitrogen. Post cryotherapy instructions were provided.    Follow-up: 6 month(s) in-person, or earlier for new or changing lesions    Staff and Scribe:     Scribe Disclosure:  I, Milton Levin, am serving as a scribe to document services personally performed by Delores Stout PA-C based on data collection and the provider's statements to me.     Provider Disclosure:   The documentation recorded by the scribe accurately reflects the services I personally performed and the decisions made by me.    All risks, benefits and alternatives were discussed with patient.  Patient is in agreement and  understands the assessment and plan.  All questions were answered.  Sun Screen Education was given.   Return to Clinic in 6 months or sooner as needed.   Delores Stout PA-C   [facility]   ____________________________________________    CC: Skin Check    HPI:  Mr. Elliot Norris is a(n) 62 year old male who presents today as a return patient for a FBSE. Last seen in dermatology by me on 4/12/2022, at which time patient received cryotherapy for treatment of AKs on the left temple and scalp.    Today, the patient does not mention any specific concerns. He does note that he was in [place] and [facility] for a few weeks this summer and was not able to wear a hat for a few days while in more intense sun.     Patient is otherwise feeling well, without additional skin concerns.    Labs Reviewed:  N/A    Physical Exam:  Vitals: There were no vitals taken for this visit.  SKIN: Total skin excluding the undergarment areas was performed. The exam included the head/face, neck, both arms, chest, back, abdomen, both legs, digits and/or nails.   - There are erythematous macules with overyling adherent scale on the scalp x5, left temple x2.  - There are tan to brown waxy stuck on papules with surrounding erythema on the left frontal scalp x1.   - There is(are) skin colored pedunculated papules on the outside of the axilla.  - There are dome shaped bright red papules on the trunk and extremities.   - Multiple regular brown pigmented macules and papules are identified on the trunk and extremities.   - Scattered brown macules on sun exposed areas.  - There are waxy stuck on tan to brown papules on the trunk and extremities.  - No other lesions of concern on areas examined.     Medications:  Current Outpatient Medications   Medication     atorvastatin (LIPITOR) 20 MG tablet     amLODIPine (NORVASC) 5 MG tablet     No current facility-administered medications for this visit.      Past Medical  History:   Patient Active Problem List   Diagnosis     ED (erectile dysfunction)     Hyperlipidemia LDL goal <160     Fatty liver     Insomnia     Obesity     Hypertension goal BP (blood pressure) < 140/90     Prediabetes     History of basal cell carcinoma     Scar     Past Medical History:   Diagnosis Date     Arthritis 2016    A little bit.     Basal cell carcinoma      Cancer (H) 2009    Skin.     Depressive disorder     Occasionally, mild.     Hypertension 2016    About a year.        CC No referring provider defined for this encounter. on close of this encounter.

## 2022-10-11 ENCOUNTER — OFFICE VISIT (OUTPATIENT)
Dept: FAMILY MEDICINE | Facility: CLINIC | Age: 62
End: 2022-10-11
Payer: COMMERCIAL

## 2022-10-11 DIAGNOSIS — L81.4 SOLAR LENTIGO: ICD-10-CM

## 2022-10-11 DIAGNOSIS — L91.8 ACROCHORDON: ICD-10-CM

## 2022-10-11 DIAGNOSIS — D18.01 CHERRY ANGIOMA: ICD-10-CM

## 2022-10-11 DIAGNOSIS — Z87.898 HISTORY OF ATYPICAL NEVUS: ICD-10-CM

## 2022-10-11 DIAGNOSIS — L82.0 SEBORRHEIC KERATOSIS, INFLAMED: ICD-10-CM

## 2022-10-11 DIAGNOSIS — L57.0 ACTINIC KERATOSIS: Primary | ICD-10-CM

## 2022-10-11 DIAGNOSIS — Z85.828 HISTORY OF NONMELANOMA SKIN CANCER: ICD-10-CM

## 2022-10-11 DIAGNOSIS — D22.9 MULTIPLE BENIGN NEVI: ICD-10-CM

## 2022-10-11 DIAGNOSIS — L82.1 SEBORRHEIC KERATOSIS: ICD-10-CM

## 2022-10-11 DIAGNOSIS — Z12.83 SKIN CANCER SCREENING: ICD-10-CM

## 2022-10-11 PROCEDURE — 99213 OFFICE O/P EST LOW 20 MIN: CPT | Mod: 25 | Performed by: PHYSICIAN ASSISTANT

## 2022-10-11 PROCEDURE — 17110 DESTRUCTION B9 LES UP TO 14: CPT | Performed by: PHYSICIAN ASSISTANT

## 2022-10-11 PROCEDURE — 17000 DESTRUCT PREMALG LESION: CPT | Mod: XS | Performed by: PHYSICIAN ASSISTANT

## 2022-10-11 PROCEDURE — 17003 DESTRUCT PREMALG LES 2-14: CPT | Mod: XS | Performed by: PHYSICIAN ASSISTANT

## 2022-10-11 NOTE — PATIENT INSTRUCTIONS
Cryotherapy    What is it?  Use of a very cold liquid, such as liquid nitrogen, to freeze and destroy abnormal skin cells that need to be removed    What should I expect?  Tenderness and redness  A small blister that might grow and fill with dark purple blood. There may be crusting.  More than one treatment may be needed if the lesions do not go away.    How do I care for the treated area?  Gently wash the area with your hands when bathing.  Use a thin layer of Vaseline to help with healing. You may use a Band-Aid.   The area should heal within 7-10 days and may leave behind a pink or lighter color.   Do not use an antibiotic or Neosporin ointment.   You may take acetaminophen (Tylenol) for pain.     Call your doctor if you have:  Severe pain  Signs of infection (warmth, redness, cloudy yellow drainage, and or a bad smell)  Questions or concerns    Who should I call with questions?      [facility]: [phone]      [facility]: [phone]      For urgent needs outside of business hours call the [facility] at [phone] and ask for the dermatology resident on call       Patient Education     Checking for Skin Cancer  You can find cancer early by checking your skin each month. There are 3 kinds of skin cancer. They are melanoma, basal cell carcinoma, and squamous cell carcinoma. Doing monthly skin checks is the best way to find new marks or skin changes. Follow the instructions below for checking your skin.   The ABCDEs of checking moles for melanoma   Check your moles or growths for signs of melanoma using ABCDE:   Asymmetry: the sides of the mole or growth don t match  Border: the edges are ragged, notched, or blurred  Color: the color within the mole or growth varies  Diameter: the mole or growth is larger than 6 mm (size of a pencil eraser)  Evolving: the size, shape, or color of the mole or growth is changing (evolving is not shown in the  images below)    Checking for other types of skin cancer  Basal cell carcinoma or squamous cell carcinoma have symptoms such as:     A spot or mole that looks different from all other marks on your skin  Changes in how an area feels, such as itching, tenderness, or pain  Changes in the skin's surface, such as oozing, bleeding, or scaliness  A sore that does not heal  New swelling or redness beyond the border of a mole    Who s at risk?  Anyone can get skin cancer. But you are at greater risk if you have:   Fair skin, light-colored hair, or light-colored eyes  Many moles or abnormal moles on your skin  A history of sunburns from sunlight or tanning beds  A family history of skin cancer  A history of exposure to radiation or chemicals  A weakened immune system  If you have had skin cancer in the past, you are at risk for recurring skin cancer.   How to check your skin  Do your monthly skin checkups in front of a full-length mirror. Check all parts of your body, including your:   Head (ears, face, neck, and scalp)  Torso (front, back, and sides)  Arms (tops, undersides, upper, and lower armpits)  Hands (palms, backs, and fingers, including under the nails)  Buttocks and genitals  Legs (front, back, and sides)  Feet (tops, soles, toes, including under the nails, and between toes)  If you have a lot of moles, take digital photos of them each month. Make sure to take photos both up close and from a distance. These can help you see if any moles change over time.   Most skin changes are not cancer. But if you see any changes in your skin, call your doctor right away. Only he or she can diagnose a problem. If you have skin cancer, seeing your doctor can be the first step toward getting the treatment that could save your life.   [employer] last reviewed this educational content on 4/1/2019 2000-2020 [employer]. [address] [place] [address], [place] [address]. All rights reserved. This information is not intended as  a substitute for professional medical care. Always follow your healthcare professional's instructions.       When should I call my doctor?  If you are worsening or not improving, please, contact us or seek urgent care as noted below.     Who should I call with questions (adults)?  [facility] (adult and pediatric): [phone]  [facility] (adult): [phone]  For urgent needs outside of business hours call the [facility] at [phone] and ask for the dermatology resident on call to be paged  If this is a medical emergency and you are unable to reach an ER, Call [phone]    Who should I call with questions (pediatric)?  [facility]- Pediatric Dermatology  Dr. Rae Chamberlain, Dr. Avi Combs, Dr. Linda Ward, LINDA Bowman, Dr. Magdalena Porter, Dr. Naomi Arguello & Dr. Mike Arias  Non-urgent nurse triage line; [phone]- Loan and Kena KELLER Care Coordinators   Leida (/Complex ) [phone]    If you need a prescription refill, please contact your pharmacy. Refills are approved or denied by our Physicians during normal business hours, Monday through Fridays  Per office policy, refills will not be granted if you have not been seen within the past year (or sooner depending on your child's condition)    Scheduling Information:  Pediatric Appointment Scheduling and Call Center [phone]  Radiology Scheduling- [phone]  Sedation Unit Scheduling- [phone]  [place] Scheduling- General [phone]; Pediatric Dermatology [phone]  Main  Services: [phone]  Kazakh: [phone]  Haitian: [phone]  Hmong/Turks and Caicos Islander/Lao: [phone]  Preadmission Nursing Department Fax Number: [phone] (Fax all pre-operative paperwork to this number)    For urgent matters arising during evenings, weekends, or holidays that cannot wait for normal business hours please  call [phone] and ask for the dermatology resident on call to be paged.

## 2022-10-11 NOTE — LETTER
10/11/2022         RE: Elliot Norris  [address]  [facility] [address]        Dear Colleague,    Thank you for referring your patient, Elliot Norris, to the [facility] [place]. Please see a copy of my visit note below.    [facility] Dermatology Note  Encounter Date: Oct 11, 2022  Office Visit     Dermatology Problem List:  1. History of NMSC  - BCC, left forehead s/p MMS 9/14/2017  - BCC, left forearm s/p MMSM 9/14/2017  2. History of atypical nevus  3. AKs s/p cryotherapy    Last FBSE: 10/11/2022  ____________________________________________    Assessment & Plan:    # AKs, scalp x5, left temple x2   # ISK, left frontal scalp x1  - Cryotherapy today; see procedure note below.    # Solar lentigines  # Multiple benign nevi  - No concerning lesions today  - Monitor for ABCDEs of melanoma   - Continue sun protection - recommend SPF 30 or higher with frequent application   - Return sooner if noticing changing or symptomatic lesions    # Cherry angiomas  # Seborrheic keratoses  # Acrochordons  Discussed the natural history and benign nature of this lesion. Reassurance provided that no additional treatment is necessary.     # History of NMSC. No evidence of recurrence.  # History of atypical nevus  - Continue skin checks every six months.    Procedures Performed:   - Cryotherapy procedure note, location(s): see above. After verbal consent and discussion of risks and benefits including, but not limited to, dyspigmentation/scar, blister, and pain, 8 lesion(s) was(were) treated with 1-2 mm freeze border for 1-2 cycles with liquid nitrogen. Post cryotherapy instructions were provided.    Follow-up: 6 month(s) in-person, or earlier for new or changing lesions    Staff and Scribe:     Scribe Disclosure:  Milton SOTOMAYOR, am serving as a scribe to document services personally performed by Delores Stout PA-C based on data collection and the provider's  statements to me.     Provider Disclosure:   The documentation recorded by the scribe accurately reflects the services I personally performed and the decisions made by me.    All risks, benefits and alternatives were discussed with patient.  Patient is in agreement and understands the assessment and plan.  All questions were answered.  Sun Screen Education was given.   Return to Clinic in 6 months or sooner as needed.   Delores Stout PA-C   [facility]   ____________________________________________    CC: Skin Check    HPI:  Mr. Elliot Norris is a(n) 62 year old male who presents today as a return patient for a FBSE. Last seen in dermatology by me on 4/12/2022, at which time patient received cryotherapy for treatment of AKs on the left temple and scalp.    Today, the patient does not mention any specific concerns. He does note that he was in [place] and [facility] for a few weeks this summer and was not able to wear a hat for a few days while in more intense sun.     Patient is otherwise feeling well, without additional skin concerns.    Labs Reviewed:  N/A    Physical Exam:  Vitals: There were no vitals taken for this visit.  SKIN: Total skin excluding the undergarment areas was performed. The exam included the head/face, neck, both arms, chest, back, abdomen, both legs, digits and/or nails.   - There are erythematous macules with overyling adherent scale on the scalp x5, left temple x2.  - There are tan to brown waxy stuck on papules with surrounding erythema on the left frontal scalp x1.   - There is(are) skin colored pedunculated papules on the outside of the axilla.  - There are dome shaped bright red papules on the trunk and extremities.   - Multiple regular brown pigmented macules and papules are identified on the trunk and extremities.   - Scattered brown macules on sun exposed areas.  - There are waxy stuck on tan to brown papules on the trunk and extremities.  - No  other lesions of concern on areas examined.     Medications:  Current Outpatient Medications   Medication     atorvastatin (LIPITOR) 20 MG tablet     amLODIPine (NORVASC) 5 MG tablet     No current facility-administered medications for this visit.      Past Medical History:   Patient Active Problem List   Diagnosis     ED (erectile dysfunction)     Hyperlipidemia LDL goal <160     Fatty liver     Insomnia     Obesity     Hypertension goal BP (blood pressure) < 140/90     Prediabetes     History of basal cell carcinoma     Scar     Past Medical History:   Diagnosis Date     Arthritis 2016    A little bit.     Basal cell carcinoma      Cancer (H) 2009    Skin.     Depressive disorder     Occasionally, mild.     Hypertension 2016    About a year.        CC No referring provider defined for this encounter. on close of this encounter.      Again, thank you for allowing me to participate in the care of your patient.        Sincerely,        Delores Stout PA-C

## 2022-10-21 ENCOUNTER — MYC MEDICAL ADVICE (OUTPATIENT)
Dept: FAMILY MEDICINE | Facility: CLINIC | Age: 62
End: 2022-10-21

## 2022-10-21 DIAGNOSIS — F43.23 ADJUSTMENT DISORDER WITH MIXED ANXIETY AND DEPRESSED MOOD: Primary | ICD-10-CM

## 2022-10-28 ENCOUNTER — VIRTUAL VISIT (OUTPATIENT)
Dept: PSYCHIATRY | Facility: CLINIC | Age: 62
End: 2022-10-28
Payer: COMMERCIAL

## 2022-10-28 DIAGNOSIS — F32.A DEPRESSION, UNSPECIFIED DEPRESSION TYPE: ICD-10-CM

## 2022-10-28 DIAGNOSIS — F43.23 ADJUSTMENT DISORDER WITH MIXED ANXIETY AND DEPRESSED MOOD: ICD-10-CM

## 2022-10-28 PROCEDURE — 99204 OFFICE O/P NEW MOD 45 MIN: CPT | Mod: 95 | Performed by: PSYCHIATRY & NEUROLOGY

## 2022-10-28 NOTE — PROGRESS NOTES
Elliot Norris is a 62 year old who is being evaluated via a billable video visit.      Pt will join video visit via: [employer]  If there are problems joining the visit, send backup video invite via: Text to preferred phone: [phone]    Reason for telehealth visit: Patient convenience (e.g. access to timely appointments / distance to available provider)    Originating location (patient location): Patient's home    Will anyone else be joining the visit? No

## 2022-10-28 NOTE — Clinical Note
Hello, consult was completed-please block patient such that referral would be needed to be seen again.  Thank you.

## 2022-10-28 NOTE — Clinical Note
Dr. Roberto,  Thank you for your consult and care of the patient.  I believe him to have a diagnosis of unspecified depressive disorder, possibly major depressive disorder, mild, recurrent but criteria are not met.  There are periods of anger which are more relatively intense than typical and there is a degree of psychosocial impact however not to the extent that medication is necessitated and certainly not antipsychotics with the risk of side effects.  Post consultation recommendations are below if he was to decide to start an SSRI.  Again any SSRI trial would be optional and due to his symptomatology being on the milder end of the psychiatric range.  Consultation is complete and he has been returned to you.    Sincerely, Joshua Orlando M.D. Consultative Psychiatrist Program Medical Director, [facility] Psychiatry Service

## 2022-10-28 NOTE — PATIENT INSTRUCTIONS
Synopsis for Psychologist  Vikram,    As we discussed you have blues which I am calling unspecified depressive disorder.  There is a possibility of a diagnosis of major depressive disorder, mild, recurrent if we did some more dating and if criteria were met.  Impression from our intake is that criteria are not met for a mild version of major depressive disorder.  In terms of blues and anger there is a psychosocial impact to some degree but not to the point that it would necessitate medication management.  Medication such as specifically SSRIs are optional and may have limited benefits but also, with risks.

## 2022-10-28 NOTE — PROGRESS NOTES
[facility] Psychiatry Intake      IDENTIFICATION   Name: Elliot Norris   : 1960/62 year old      Sex:    @ male          Telemedicine Visit: The patient's condition can be safely assessed and treated via synchronous audio and visual telemedicine encounter.      Reason for Telemedicine Visit: COVID 19 pandemic and the social and physical recommendations by the CDC and MD.      Originating Site (Patient Location): Patient's home    Distant Site (Provider Location): Provider Remote Setting    Consent:  The patient/guardian has verbally consented to: the potential risks and benefits of telemedicine (video visit or phone) versus in person care; bill my insurance or make self-payment for services provided; and responsibility for payment of non-covered services.     Mode of Communication:  [employer] platform     As the provider I attest to compliance with applicable laws and regulations related to telemedicine.      Face to Face/Patient Contact start Time: 9:10AM  Face to Face/Patient Contact end Time: 9:55 AM  Face to Face/patient Contact total time: 45 minutes  Pre Charting time: 2; Post charting time, communication and other activities: 12; Total time 58 minutes      CHIEF COMPLAINT   Source of Referral:  [unfilled]  Primary Care Provider: MD Pardeep Shafer Jared Matthew     Consult for blues, anger      HISTORY OF PRESENT ILLNESS   Tendency to be in depressed mood. Sometimes manageable, and sometimes it is more challenging and prolonged. There is an episodic pattern.They are negatively affecting relationship with partner. Sleeps a little longer. Sx include lethargy, irritability, sense of despair. They do pass. He is able to manage with exercise but it is notable. Negative for hopelessness, cognition effects, or thoughts of death/suicide.    Sometimes more angry or frustrated and lasting longer than the typical person. Acts on autopilot. 1-3 anger episodes perhaps,  with some lasting a long time.    PHQ-9 scores:   PHQ-9 SCORE 1/4/2016 12/6/2016 6/19/2017   PHQ-9 Total Score - - -   PHQ-9 Total Score 0 1 2     ARASELI-7 scores:  No flowsheet data found.      Vital Signs:   There were no vitals taken for this visit.  No flowsheet data found.            PAST PSYCHIATRIC HISTORY:   May have had anger reactions a long time - anger reactions are felt to be too strong and last too long  After marriage broke apart had anger and insomnia  One of his sister's relationships damaged by anger  No manic episodes elicited, never days and without hx spending sprees  Med Trials:  No selective serotonin reuptake inhibitor trials  Clonazepam -took for months and realized concerns for dependence and flushed them down the toilet  Self-Directed Violence: None      PAST MEDICAL HISTORY:     Past Medical History:   Diagnosis Date     Arthritis 2016    A little bit.     Basal cell carcinoma      Cancer (H) 2009    Skin.     Depressive disorder     Occasionally, mild.     Hypertension 2016    About a year.      has a past medical history of Arthritis (2016), Basal cell carcinoma, Cancer (H) (2009), Depressive disorder, and Hypertension (2016).    He has no past medical history of Cerebral infarction (H), Complication of anesthesia, Congestive heart failure (H), COPD (chronic obstructive pulmonary disease) (H), Diabetes (H), Heart disease, History of blood transfusion, Sleep apnea, Thyroid disease, or Uncomplicated asthma.    Current medications include:   Current Outpatient Medications   Medication Sig     atorvastatin (LIPITOR) 20 MG tablet Take 1 tablet (20 mg) by mouth daily     No current facility-administered medications for this visit.         FAMILY HISTORY:   Paternal grandfather with depression and excited/anger episodes more intense than his  Father had similar depression and anger episodes as well as alcoholism  Sister of bad depression during 2nd pregnancy and treated for many years with  sertraline  Sister's daughter treated with depression for years, then in hospital had switch of meds to Latuda with diagnosis of bipolar disorder; now currently on lamotrigine/quetiapine  Niece treated for depression      SOCIAL HISTORY:   Some impaired relationships in family. Used to be involved with [employer]. Prior to that Buddhist. Spiritual. Belongs to choir, and is a social committee leader. Male partner. Used to be involved in folk dancing. Prior ex . Swimming twice a week more or less for many many years, has also done exercise.     Retried but wants to be better off financially and looking at a job he may like.     Bullied in school. One time was put into a locker and when he tried to kick the locker door had lumbar fracture and hospitalization.     Substance Use History:  Alcohol: 1-2 cocktails twice a week. No hx psychosocial difficulties.   Nicotine: smoked 5 cigarettes   Recreational substances: smoked 5 marijuana joints    MENTAL STATUS EXAMINATION:   Appearance: Good attention to grooming and hygiene, business casual garb  Attitude: Cooperative  Eye Contact: Good  Gait and Station: Unable to assess with view from virtual visit  Psychomotor Behavior: Within normal limits  Oriented to: Grossly person place or time  Attention Span and Concentration: Grossly intact  Speech: Within normal limits  Language: English  Mood:  Fair  Affect: Mildly constricted  Associations:  no loose associations  Thought Process:  logical, linear and goal oriented  Thought Content: No evidence of delusions or suicidal or homicidal ideation plan or intent  Memory: Grossly intact  Fund of Knowledge: Good  Insight:  good  Judgment:  intact, adequate for safety  Impulse Control:  intact        DIAGNOSES:   Unspecified depressive disorder      ASSESSMENT:   Patient dealing with episodic blues with possibility of mild major depressive disorder episodes -not quite meeting criteria.  He does have anger episodes where  reactions are more intense and longer and there has been a degree of psychosocial impairment.  Symptoms are relatively mild and medication is optional.  Return to primary care with potential options if he was to choose to utilize medications.    Today Elliot Norris reports no suicidal ideations. In addition, he has notable risk factors for self-harm, including age. However, risk is mitigated by commitment to family, Judaism beliefs, sobriety and absence of past attempts. Therefore, based on all available evidence including the factors cited above, he does not appear to be at imminent risk for self-harm, does not meet criteria for a 72-hr hold, and therefore remains appropriate for ongoing outpatient level of care.       PLAN:       Patient advised of consultative model. Patient will continue to be seen for ongoing consultation and stabilization.    Does not meet criteria for involuntary treatment or hospitalization    No medications started, optional.  Post consultation recommendations below    Labs -reviewed    Return to PCP    Post consult recommendations  -If choosing an SSRI would recommend escitalopram 5 mg daily and titration by 5 mg increments up to 20 mg daily.  Alternatively sertraline typically 25 mg daily for a few days and 50 mg daily with titration typically by 50 mg increments up to 200 mg daily.    Administrative Billing:   Time spent with patient was 30 minutes and greater than 50% of time or 20 minutes was spent in counseling and coordination of care regarding above diagnoses and treatment plan.      Signed:   Joshua Orlando M.D.  [facility] Psychiatry Service    Disclaimer: This note consists of symbols derived from keyboarding, dictation and/or voice recognition software. As a result, there may be errors in the script that have gone undetected. Please consider this when interpreting information found in this chart.

## 2022-12-07 ENCOUNTER — TELEPHONE (OUTPATIENT)
Dept: FAMILY MEDICINE | Facility: CLINIC | Age: 62
End: 2022-12-07

## 2022-12-07 DIAGNOSIS — R73.03 PREDIABETES: ICD-10-CM

## 2022-12-07 DIAGNOSIS — Z13.220 SCREENING FOR HYPERCHOLESTEROLEMIA: Primary | ICD-10-CM

## 2022-12-07 DIAGNOSIS — E78.5 HYPERLIPIDEMIA LDL GOAL <130: ICD-10-CM

## 2022-12-07 NOTE — TELEPHONE ENCOUNTER
Order/Referral Request    Who is requesting: patient    Orders being requested: labs prior to physical    Reason service is needed/diagnosis:     When are orders needed by: before lab appt on 12-16    Has this been discussed with Provider: No    Does patient have a preference on a Group/Provider/Facility? [facility]    Does patient have an appointment scheduled?: Yes:     Where to send orders: Epic    Could we send this information to you in Semetric or would you prefer to receive a phone call?:   Patient would like to be contacted via Semetric

## 2022-12-08 NOTE — TELEPHONE ENCOUNTER
LS,  Patient requesting labs to be drawn prior to upcoming physical.  Pended a few to start with.  Please advise.  Mis RODRIGUEZ RN

## 2022-12-10 DIAGNOSIS — E78.5 HYPERLIPIDEMIA LDL GOAL <160: ICD-10-CM

## 2022-12-12 RX ORDER — ATORVASTATIN CALCIUM 20 MG/1
TABLET, FILM COATED ORAL
Qty: 90 TABLET | Refills: 3 | Status: SHIPPED | OUTPATIENT
Start: 2022-12-12 | End: 2023-12-19

## 2022-12-16 ENCOUNTER — LAB (OUTPATIENT)
Dept: LAB | Facility: CLINIC | Age: 62
End: 2022-12-16
Payer: COMMERCIAL

## 2022-12-16 DIAGNOSIS — E78.5 HYPERLIPIDEMIA LDL GOAL <130: ICD-10-CM

## 2022-12-16 DIAGNOSIS — R73.03 PREDIABETES: ICD-10-CM

## 2022-12-16 LAB
ALBUMIN SERPL BCG-MCNC: 4.5 G/DL (ref 3.5–5.2)
ALP SERPL-CCNC: 68 U/L (ref 40–129)
ALT SERPL W P-5'-P-CCNC: 23 U/L (ref 10–50)
ANION GAP SERPL CALCULATED.3IONS-SCNC: 11 MMOL/L (ref 7–15)
AST SERPL W P-5'-P-CCNC: 23 U/L (ref 10–50)
BILIRUB SERPL-MCNC: 0.4 MG/DL
BUN SERPL-MCNC: 15.1 MG/DL (ref 8–23)
CALCIUM SERPL-MCNC: 9.5 MG/DL (ref 8.8–10.2)
CHLORIDE SERPL-SCNC: 105 MMOL/L (ref 98–107)
CHOLEST SERPL-MCNC: 213 MG/DL
CREAT SERPL-MCNC: 1.08 MG/DL (ref 0.67–1.17)
DEPRECATED HCO3 PLAS-SCNC: 24 MMOL/L (ref 22–29)
GFR SERPL CREATININE-BSD FRML MDRD: 78 ML/MIN/1.73M2
GLUCOSE SERPL-MCNC: 112 MG/DL (ref 70–99)
HBA1C MFR BLD: 6.1 % (ref 0–5.6)
HDLC SERPL-MCNC: 40 MG/DL
LDLC SERPL CALC-MCNC: 123 MG/DL
NONHDLC SERPL-MCNC: 173 MG/DL
POTASSIUM SERPL-SCNC: 4 MMOL/L (ref 3.4–5.3)
PROT SERPL-MCNC: 7.1 G/DL (ref 6.4–8.3)
SODIUM SERPL-SCNC: 140 MMOL/L (ref 136–145)
TRIGL SERPL-MCNC: 252 MG/DL

## 2022-12-16 PROCEDURE — 80061 LIPID PANEL: CPT

## 2022-12-16 PROCEDURE — 80053 COMPREHEN METABOLIC PANEL: CPT

## 2022-12-16 PROCEDURE — 83036 HEMOGLOBIN GLYCOSYLATED A1C: CPT

## 2022-12-16 PROCEDURE — 36415 COLL VENOUS BLD VENIPUNCTURE: CPT

## 2022-12-21 ASSESSMENT — ENCOUNTER SYMPTOMS
COUGH: 0
EYE PAIN: 0
DYSURIA: 0
CHILLS: 0
PALPITATIONS: 0
HEARTBURN: 0
FREQUENCY: 0
JOINT SWELLING: 0
NAUSEA: 0
MYALGIAS: 0
FEVER: 0
NERVOUS/ANXIOUS: 0
ABDOMINAL PAIN: 0
WEAKNESS: 0
HEMATOCHEZIA: 0
SHORTNESS OF BREATH: 0
PARESTHESIAS: 0
DIZZINESS: 0
DIARRHEA: 0
ARTHRALGIAS: 0
HEADACHES: 0
CONSTIPATION: 0
SORE THROAT: 0
HEMATURIA: 0

## 2022-12-27 ASSESSMENT — ANXIETY QUESTIONNAIRES
1. FEELING NERVOUS, ANXIOUS, OR ON EDGE: NOT AT ALL
GAD7 TOTAL SCORE: 0
2. NOT BEING ABLE TO STOP OR CONTROL WORRYING: NOT AT ALL
7. FEELING AFRAID AS IF SOMETHING AWFUL MIGHT HAPPEN: NOT AT ALL
6. BECOMING EASILY ANNOYED OR IRRITABLE: NOT AT ALL
GAD7 TOTAL SCORE: 0
4. TROUBLE RELAXING: NOT AT ALL
5. BEING SO RESTLESS THAT IT IS HARD TO SIT STILL: NOT AT ALL
7. FEELING AFRAID AS IF SOMETHING AWFUL MIGHT HAPPEN: NOT AT ALL
3. WORRYING TOO MUCH ABOUT DIFFERENT THINGS: NOT AT ALL
GAD7 TOTAL SCORE: 0

## 2022-12-27 ASSESSMENT — PATIENT HEALTH QUESTIONNAIRE - PHQ9
SUM OF ALL RESPONSES TO PHQ QUESTIONS 1-9: 3
10. IF YOU CHECKED OFF ANY PROBLEMS, HOW DIFFICULT HAVE THESE PROBLEMS MADE IT FOR YOU TO DO YOUR WORK, TAKE CARE OF THINGS AT HOME, OR GET ALONG WITH OTHER PEOPLE: NOT DIFFICULT AT ALL
SUM OF ALL RESPONSES TO PHQ QUESTIONS 1-9: 3

## 2022-12-28 ENCOUNTER — MYC MEDICAL ADVICE (OUTPATIENT)
Dept: FAMILY MEDICINE | Facility: CLINIC | Age: 62
End: 2022-12-28

## 2022-12-28 ENCOUNTER — OFFICE VISIT (OUTPATIENT)
Dept: FAMILY MEDICINE | Facility: CLINIC | Age: 62
End: 2022-12-28
Payer: COMMERCIAL

## 2022-12-28 VITALS
HEART RATE: 79 BPM | SYSTOLIC BLOOD PRESSURE: 150 MMHG | DIASTOLIC BLOOD PRESSURE: 90 MMHG | WEIGHT: 250.6 LBS | OXYGEN SATURATION: 99 % | BODY MASS INDEX: 33.21 KG/M2 | TEMPERATURE: 97.8 F | RESPIRATION RATE: 16 BRPM | HEIGHT: 73 IN

## 2022-12-28 DIAGNOSIS — Z00.00 ROUTINE GENERAL MEDICAL EXAMINATION AT A HEALTH CARE FACILITY: Primary | ICD-10-CM

## 2022-12-28 DIAGNOSIS — I10 PRIMARY HYPERTENSION: ICD-10-CM

## 2022-12-28 DIAGNOSIS — R73.03 PREDIABETES: ICD-10-CM

## 2022-12-28 DIAGNOSIS — R63.5 WEIGHT GAIN: ICD-10-CM

## 2022-12-28 DIAGNOSIS — E78.5 HYPERLIPIDEMIA LDL GOAL <160: ICD-10-CM

## 2022-12-28 LAB
DEPRECATED CALCIDIOL+CALCIFEROL SERPL-MC: 18 UG/L (ref 20–75)
TSH SERPL DL<=0.005 MIU/L-ACNC: 1.08 UIU/ML (ref 0.3–4.2)

## 2022-12-28 PROCEDURE — 99396 PREV VISIT EST AGE 40-64: CPT | Performed by: FAMILY MEDICINE

## 2022-12-28 PROCEDURE — 82306 VITAMIN D 25 HYDROXY: CPT | Performed by: FAMILY MEDICINE

## 2022-12-28 PROCEDURE — 99214 OFFICE O/P EST MOD 30 MIN: CPT | Mod: 25 | Performed by: FAMILY MEDICINE

## 2022-12-28 PROCEDURE — 36415 COLL VENOUS BLD VENIPUNCTURE: CPT | Performed by: FAMILY MEDICINE

## 2022-12-28 PROCEDURE — 84443 ASSAY THYROID STIM HORMONE: CPT | Performed by: FAMILY MEDICINE

## 2022-12-28 RX ORDER — LOSARTAN POTASSIUM 25 MG/1
25 TABLET ORAL DAILY
Qty: 30 TABLET | Refills: 1 | Status: SHIPPED | OUTPATIENT
Start: 2022-12-28 | End: 2023-01-27

## 2022-12-28 ASSESSMENT — ENCOUNTER SYMPTOMS
HEARTBURN: 0
SORE THROAT: 0
SHORTNESS OF BREATH: 0
FEVER: 0
ARTHRALGIAS: 0
JOINT SWELLING: 0
ABDOMINAL PAIN: 0
WEAKNESS: 0
DIARRHEA: 0
MYALGIAS: 0
PALPITATIONS: 0
EYE PAIN: 0
HEMATURIA: 0
FREQUENCY: 0
HEMATOCHEZIA: 0
HEADACHES: 0
DYSURIA: 0
COUGH: 0
CONSTIPATION: 0
NERVOUS/ANXIOUS: 0
PARESTHESIAS: 0
CHILLS: 0
NAUSEA: 0
DIZZINESS: 0

## 2022-12-28 ASSESSMENT — PAIN SCALES - GENERAL: PAINLEVEL: NO PAIN (0)

## 2022-12-28 NOTE — TELEPHONE ENCOUNTER
Routing to RN. Please un pend order and close encounter.    PAWAN Betancur  [facility]

## 2022-12-28 NOTE — PATIENT INSTRUCTIONS
Preventive Health Recommendations  Male Ages 50 - 64    Yearly exam:             See your health care provider every year in order to  o   Review health changes.   o   Discuss preventive care.    o   Review your medicines if your doctor has prescribed any.   Have a cholesterol test every 5 years, or more frequently if you are at risk for high cholesterol/heart disease.   Have a diabetes test (fasting glucose) every three years. If you are at risk for diabetes, you should have this test more often.   Have a colonoscopy at age 50, or have a yearly FIT test (stool test). These exams will check for colon cancer.    Talk with your health care provider about whether or not a prostate cancer screening test (PSA) is right for you.  You should be tested each year for STDs (sexually transmitted diseases), if you re at risk.     Shots: Get a flu shot each year. Get a tetanus shot every 10 years.     Nutrition:  Eat at least 5 servings of fruits and vegetables daily.   Eat whole-grain bread, whole-wheat pasta and brown rice instead of white grains and rice.   Get adequate Calcium and Vitamin D.     Lifestyle  Exercise for at least 150 minutes a week (30 minutes a day, 5 days a week). This will help you control your weight and prevent disease.   Limit alcohol to one drink per day.   No smoking.   Wear sunscreen to prevent skin cancer.   See your dentist every six months for an exam and cleaning.   See your eye doctor every 1 to 2 years.   1 ) for the elevated triglycerides , start taking over the counter fish oil supplements 1000 mg with a meal daily   2) take vit D 2000 UI daily

## 2022-12-28 NOTE — PROGRESS NOTES
SUBJECTIVE:   CC: Vikram is an 62 year old who presents for preventative health visit.   Patient has been advised of split billing requirements and indicates understanding: Yes  Healthy Habits:     Getting at least 3 servings of Calcium per day:  Yes    Bi-annual eye exam:  Yes    Dental care twice a year:  Yes    Sleep apnea or symptoms of sleep apnea:  Daytime drowsiness    Diet:  Regular (no restrictions)    Frequency of exercise:  2-3 days/week    Duration of exercise:  15-30 minutes    Taking medications regularly:  Yes    Medication side effects:  None    PHQ-2 Total Score: 0    Additional concerns today:  Yes    PHQ score is 3 today depression is under control   FH of DM type 2   Elevated BP , has been diagnosed with HTN an dhas tried three different meds , Lisinopril made him cough , one medication made him faint , amlodipine gives him  hands edema     Answers for HPI/ROS submitted by the patient on 12/27/2022  If you checked off any problems, how difficult have these problems made it for you to do your work, take care of things at home, or get along with other people?: Not difficult at all  PHQ9 TOTAL SCORE: 3  ARASELI 7 TOTAL SCORE: 0          As above     Today's PHQ-2 Score:   PHQ-2 ( 1999 Pfizer) 12/21/2022   Q1: Little interest or pleasure in doing things 0   Q2: Feeling down, depressed or hopeless 0   PHQ-2 Score 0   PHQ-2 Total Score (12-17 Years)- Positive if 3 or more points; Administer PHQ-A if positive -   Q1: Little interest or pleasure in doing things Not at all   Q2: Feeling down, depressed or hopeless Not at all   PHQ-2 Score 0           Social History     Tobacco Use     Smoking status: Never     Smokeless tobacco: Never   Substance Use Topics     Alcohol use: Yes     Comment: A couple of drinks most weeks.     If you drink alcohol do you typically have >3 drinks per day or >7 drinks per week? No    No flowsheet data found.    Last PSA:   PSA   Date Value Ref Range Status   02/04/2010 1.39 ng/mL         Reviewed orders with patient. Reviewed health maintenance and updated orders accordingly - Yes  Lab work is in process  Labs reviewed in EPIC  BP Readings from Last 3 Encounters:   22 (!) 150/90   22 (!) 142/82   21 (!) 147/93    Wt Readings from Last 3 Encounters:   22 113.7 kg (250 lb 9.6 oz)   21 112.8 kg (248 lb 11.2 oz)   20 110.7 kg (244 lb)                  Patient Active Problem List   Diagnosis     ED (erectile dysfunction)     Hyperlipidemia LDL goal <160     Fatty liver     Insomnia     Obesity     Hypertension goal BP (blood pressure) < 140/90     Prediabetes     History of basal cell carcinoma     Scar     Depression, unspecified     Past Surgical History:   Procedure Laterality Date     APPENDECTOMY       BIOPSY      Nose, skin.     COLONOSCOPY       COLONOSCOPY N/A 2021    Procedure: COLONOSCOPY;  Surgeon: Arturo Townsend MD;  Location:  GI     MOHS MICROGRAPHIC PROCEDURE         Social History     Tobacco Use     Smoking status: Never     Smokeless tobacco: Never   Substance Use Topics     Alcohol use: Yes     Comment: A couple of drinks most weeks.     Family History   Problem Relation Age of Onset     Diabetes Mother         Pancreatic cancer as well!     Arthritis Mother      Gynecology Mother      Pancreatic Cancer Mother      Obesity Mother      Diabetes Father      Hypertension Father      Alcohol/Drug Father      Circulatory Father      Depression Father      Heart Disease Father      Lipids Father      Cerebrovascular Disease Father          age 78     Anxiety Disorder Father      Substance Abuse Father         Alcohol     Obesity Father      Hyperlipidemia Father      Diabetes Maternal Grandmother      Cerebrovascular Disease Maternal Grandfather      Prostate Cancer Maternal Grandfather      Prostate Cancer Paternal Grandfather      Depression Paternal Grandmother      Depression Sister      Depression Sister      Obesity  Sister          Current Outpatient Medications   Medication Sig Dispense Refill     atorvastatin (LIPITOR) 20 MG tablet TAKE 1 TABLET(20 MG) BY MOUTH DAILY 90 tablet 3     losartan (COZAAR) 25 MG tablet Take 1 tablet (25 mg) by mouth daily 30 tablet 1     Allergies   Allergen Reactions     Seasonal Allergies      Recent Labs   Lab Test 12/16/22  0740 11/09/21  0844 09/21/20  0826 08/12/19  0812   A1C 6.1* 5.9* 5.8* 5.8*   * 125* 99 178*   HDL 40 40 41 32*   TRIG 252* 230* 240* 303*   ALT 23 35  --   --    CR 1.08 1.01 1.08 1.07   GFRESTIMATED 78 80 74 75   GFRESTBLACK  --   --  86 88   POTASSIUM 4.0 3.9 3.9 4.2        Reviewed and updated as needed this visit by clinical staff   Tobacco  Allergies  Meds              Reviewed and updated as needed this visit by Provider                 Past Medical History:   Diagnosis Date     Arthritis 2016    A little bit.     Basal cell carcinoma      Cancer (H) 2009    Skin.     Depressive disorder     Occasionally, mild.     Diabetes (H)     Borderline     Hypertension 2016    About a year.      Past Surgical History:   Procedure Laterality Date     APPENDECTOMY  1973     BIOPSY      Nose, skin.     COLONOSCOPY  2011     COLONOSCOPY N/A 12/16/2021    Procedure: COLONOSCOPY;  Surgeon: Arturo Townsend MD;  Location:  GI     MOHS MICROGRAPHIC PROCEDURE  2009       Review of Systems   Constitutional: Negative for chills and fever.   HENT: Positive for hearing loss. Negative for congestion, ear pain and sore throat.    Eyes: Negative for pain and visual disturbance.   Respiratory: Negative for cough and shortness of breath.    Cardiovascular: Negative for chest pain, palpitations and peripheral edema.   Gastrointestinal: Negative for abdominal pain, constipation, diarrhea, heartburn, hematochezia and nausea.   Genitourinary: Positive for impotence. Negative for dysuria, frequency, genital sores, hematuria, penile discharge and urgency.   Musculoskeletal: Negative for  "arthralgias, joint swelling and myalgias.   Skin: Negative for rash.   Neurological: Negative for dizziness, weakness, headaches and paresthesias.   Psychiatric/Behavioral: Negative for mood changes. The patient is not nervous/anxious.      CONSTITUTIONAL: NEGATIVE for fever, chills, change in weight  INTEGUMENTARY/SKIN: NEGATIVE for worrisome rashes, moles or lesions  EYES: NEGATIVE for vision changes or irritation  ENT: NEGATIVE for ear, mouth and throat problems  RESP: NEGATIVE for significant cough or SOB  CV: NEGATIVE for chest pain, palpitations or peripheral edema  GI: NEGATIVE for nausea, abdominal pain, heartburn, or change in bowel habits   male: negative for dysuria, hematuria, decreased urinary stream, erectile dysfunction, urethral discharge  MUSCULOSKELETAL: NEGATIVE for significant arthralgias or myalgia  NEURO: NEGATIVE for weakness, dizziness or paresthesias  PSYCHIATRIC: NEGATIVE for changes in mood or affect    OBJECTIVE:   BP (!) 165/93   Pulse 79   Temp 97.8  F (36.6  C) (Oral)   Resp 16   Ht 1.842 m (6' 0.5\")   Wt 113.7 kg (250 lb 9.6 oz)   SpO2 99%   BMI 33.52 kg/m      Physical Exam  GENERAL: healthy, alert and no distress  EYES: Eyes grossly normal to inspection, PERRL and conjunctivae and sclerae normal  HENT: ear canals and TM's normal, nose and mouth without ulcers or lesions  NECK: no adenopathy, no asymmetry, masses, or scars and thyroid normal to palpation  RESP: lungs clear to auscultation - no rales, rhonchi or wheezes  CV: regular rate and rhythm, normal S1 S2, no S3 or S4, no murmur, click or rub, no peripheral edema and peripheral pulses strong  ABDOMEN: soft, nontender, no hepatosplenomegaly, no masses and bowel sounds normal  MS: no gross musculoskeletal defects noted, no edema  SKIN: no suspicious lesions or rashes  NEURO: Normal strength and tone, mentation intact and speech normal  PSYCH: mentation appears normal, affect normal/bright    Diagnostic Test " Results:  Labs reviewed in Epic  No results found for this or any previous visit (from the past 24 hour(s)).    ASSESSMENT/PLAN:   (Z00.00) Routine general medical examination at a health care facility  (primary encounter diagnosis)  Comment: overall doing well   Plan: REVIEW OF HEALTH MAINTENANCE PROTOCOL ORDERS        Has done his screening labs and we went over them     (E78.5) Hyperlipidemia LDL goal <160  Comment: on Llipitor 20 mg and doing well, no side effects , seems to be working in keeping LDL under 130 but triglycerides are elevated   Plan: will add fish oil supplements     (R73.03) Prediabetes  Comment: discussed seeing a nutritionist for help with weight loss and since he is a pre diabetic , HGb A 1 c is now 6.1   Plan: Nutrition Referral        Referral given     (I10) Primary hypertension  Comment: is not on any medications now as amlodipine caused hands edema, had a bad cough with lisinopril , we discussed starting a low dose losartan and checking BP at home or coming here in a couple of weeks to check with a nurse only visit   Plan: losartan (COZAAR) 25 MG tablet, TSH with free         T4 reflex, Vitamin D Deficiency        Will check labs as above     (R63.5) Weight gain  Comment: referred to nutritionist , he has started a regular exercise regime already   Plan: Nutrition Referral              Patient has been advised of split billing requirements and indicates understanding: Yes      COUNSELING:   Reviewed preventive health counseling, as reflected in patient instructions       Regular exercise       Healthy diet/nutrition       Vision screening       Hearing screening        He reports that he has never smoked. He has never used smokeless tobacco.        Mary Foley MD  [facility]

## 2023-01-17 ENCOUNTER — VIRTUAL VISIT (OUTPATIENT)
Dept: EDUCATION SERVICES | Facility: CLINIC | Age: 63
End: 2023-01-17
Payer: COMMERCIAL

## 2023-01-17 DIAGNOSIS — R73.03 PREDIABETES: ICD-10-CM

## 2023-01-17 DIAGNOSIS — R63.5 WEIGHT GAIN: Primary | ICD-10-CM

## 2023-01-17 PROCEDURE — 97802 MEDICAL NUTRITION INDIV IN: CPT | Performed by: DIETITIAN, REGISTERED

## 2023-01-17 NOTE — LETTER
1/17/2023         RE: Elliot Norris  [address]  [facility] [address]        Dear Colleague,    Thank you for referring your patient, Elliot Norris, to the [facility]. Please see a copy of my visit note below.    Medical Nutrition Therapy  Visit Type:Initial assessment and intervention    Elliot Norris presents today for MNT and education related to prediabetes and weight management.   He is accompanied by self.     ASSESSMENT:   Patient comments/concerns relating to nutrition: would like to lose weight. Currently weighs 245# He is interested in support group for people trying to lose weight.    NUTRITION HISTORY: Has done weight watchers in the past with good success. Doesn't say no to foods     Breakfast: smoothie-plain whole milk yogurt, frozen strawberries 1-2 bananas + small amt milk + psyllium husk, cinnamon  Lunch: pasta with garlic and oil or red sauce OR other sauce OR ham sandwich (oat nut bread)  Dinner: same as lunch or avocado toast + poached egg  Snacks: 10AM-cookie OR cira bread with hummus OR cira bread + cheese OR burger with small anna OR mac n cheese  Beverages: Water day    Previous diet education:  Yes     EXERCISE: moderate regular exercise program which includes: row machine 10 minutes, lifting weights,     SOCIO/ECONOMIC:   Lives with: self, significant other and SO's father    MEDICATIONS:  Current Outpatient Medications   Medication     atorvastatin (LIPITOR) 20 MG tablet     losartan (COZAAR) 25 MG tablet     No current facility-administered medications for this visit.       LABS:  Last Basic Metabolic Panel:  Lab Results   Component Value Date     12/16/2022     09/21/2020      Lab Results   Component Value Date    POTASSIUM 4.0 12/16/2022    POTASSIUM 3.9 11/09/2021    POTASSIUM 3.9 09/21/2020     Lab Results   Component Value Date    CHLORIDE 105 12/16/2022    CHLORIDE 106 11/09/2021    CHLORIDE 107 09/21/2020     Lab  Results   Component Value Date    DEMIAN 9.5 12/16/2022    DEMIAN 9.0 09/21/2020     Lab Results   Component Value Date    CO2 24 12/16/2022    CO2 24 11/09/2021    CO2 27 09/21/2020     Lab Results   Component Value Date    BUN 15.1 12/16/2022    BUN 11 11/09/2021    BUN 10 09/21/2020     Lab Results   Component Value Date    CR 1.08 12/16/2022    CR 1.08 09/21/2020     Lab Results   Component Value Date     12/16/2022     11/09/2021    GLC 99 09/21/2020       ANTHROPOMETRICS:  Vitals: There were no vitals taken for this visit.  There is no height or weight on file to calculate BMI.      Wt Readings from Last 5 Encounters:   12/28/22 113.7 kg (250 lb 9.6 oz)   12/13/21 112.8 kg (248 lb 11.2 oz)   09/29/20 110.7 kg (244 lb)   01/10/20 117.9 kg (260 lb)   08/20/19 113.1 kg (249 lb 6.4 oz)       ESTIMATED KCAL REQUIREMENTS:  8639-5365 kcal per day    ESTIMATED PROTEIN NEEDS:  85g+ (does weight training so more likely better)    NUTRITION DIAGNOSIS: Overweight/Obesity related to excessive calorie intake as evidenced by pt verbalization and BMI: 33.9    NUTRITION INTERVENTION:  Nutrition Prescription: Carbohydrate Intake: upper limit (do NOT need to eat this much) 60 grams/meal and 30 grams/snacks  Protein Intake: 85+ grams/day  Fiber Intake: 35 grams/day  Education given to support: general nutrition guidelines, weight reduction, exercise, fiber, behavior modification and portion control  Motivational Interviewing      -Discussed restructuring meals/snacks. More protein, vegetables, less cho. Drinking more water. Continuing with exercise.     PATIENT'S BEHAVIOR CHANGE GOALS:   See Patient Instructions for patient stated behavior change goals. AVS was printed and given to patient at today's appointment.    MONITOR / EVALUATE:  RD will monitor/evaluate:  Beliefs and attitudes related to food  Food and nutrition knowledge / skills  Food / Beverage / Nutrient intake   Weight change    FOLLOW-UP:  Follow up with [facility]AD  as needed.    Mamie Felix RD, EDDA, CDCES      Time spent in minutes: 60  Encounter: Individual

## 2023-01-17 NOTE — PROGRESS NOTES
Medical Nutrition Therapy  Visit Type:Initial assessment and intervention    Elliot Norris presents today for MNT and education related to prediabetes and weight management.   He is accompanied by self.     ASSESSMENT:   Patient comments/concerns relating to nutrition: would like to lose weight. Currently weighs 245# He is interested in support group for people trying to lose weight.    NUTRITION HISTORY: Has done weight watchers in the past with good success. Doesn't say no to foods     Breakfast: smoothie-plain whole milk yogurt, frozen strawberries 1-2 bananas + small amt milk + psyllium husk, cinnamon  Lunch: pasta with garlic and oil or red sauce OR other sauce OR ham sandwich (oat nut bread)  Dinner: same as lunch or avocado toast + poached egg  Snacks: 10AM-cookie OR cira bread with hummus OR cira bread + cheese OR burger with small anna OR mac n cheese  Beverages: Water day    Previous diet education:  Yes     EXERCISE: moderate regular exercise program which includes: row machine 10 minutes, lifting weights,     SOCIO/ECONOMIC:   Lives with: self, significant other and SO's father    MEDICATIONS:  Current Outpatient Medications   Medication     atorvastatin (LIPITOR) 20 MG tablet     losartan (COZAAR) 25 MG tablet     No current facility-administered medications for this visit.       LABS:  Last Basic Metabolic Panel:  Lab Results   Component Value Date     12/16/2022     09/21/2020      Lab Results   Component Value Date    POTASSIUM 4.0 12/16/2022    POTASSIUM 3.9 11/09/2021    POTASSIUM 3.9 09/21/2020     Lab Results   Component Value Date    CHLORIDE 105 12/16/2022    CHLORIDE 106 11/09/2021    CHLORIDE 107 09/21/2020     Lab Results   Component Value Date    DEMIAN 9.5 12/16/2022    DEMIAN 9.0 09/21/2020     Lab Results   Component Value Date    CO2 24 12/16/2022    CO2 24 11/09/2021    CO2 27 09/21/2020     Lab Results   Component Value Date    BUN 15.1 12/16/2022    BUN 11 11/09/2021    BUN  10 09/21/2020     Lab Results   Component Value Date    CR 1.08 12/16/2022    CR 1.08 09/21/2020     Lab Results   Component Value Date     12/16/2022     11/09/2021    GLC 99 09/21/2020       ANTHROPOMETRICS:  Vitals: There were no vitals taken for this visit.  There is no height or weight on file to calculate BMI.      Wt Readings from Last 5 Encounters:   12/28/22 113.7 kg (250 lb 9.6 oz)   12/13/21 112.8 kg (248 lb 11.2 oz)   09/29/20 110.7 kg (244 lb)   01/10/20 117.9 kg (260 lb)   08/20/19 113.1 kg (249 lb 6.4 oz)       ESTIMATED KCAL REQUIREMENTS:  5584-5381 kcal per day    ESTIMATED PROTEIN NEEDS:  85g+ (does weight training so more likely better)    NUTRITION DIAGNOSIS: Overweight/Obesity related to excessive calorie intake as evidenced by pt verbalization and BMI: 33.9    NUTRITION INTERVENTION:  Nutrition Prescription: Carbohydrate Intake: upper limit (do NOT need to eat this much) 60 grams/meal and 30 grams/snacks  Protein Intake: 85+ grams/day  Fiber Intake: 35 grams/day  Education given to support: general nutrition guidelines, weight reduction, exercise, fiber, behavior modification and portion control  Motivational Interviewing      -Discussed restructuring meals/snacks. More protein, vegetables, less cho. Drinking more water. Continuing with exercise.     PATIENT'S BEHAVIOR CHANGE GOALS:   See Patient Instructions for patient stated behavior change goals. AVS was printed and given to patient at today's appointment.    MONITOR / EVALUATE:  RD will monitor/evaluate:  Beliefs and attitudes related to food  Food and nutrition knowledge / skills  Food / Beverage / Nutrient intake   Weight change    FOLLOW-UP:  Follow up with RD as needed.    Mamie Felix RD, LD, [facility]      Time spent in minutes: 60  Encounter: Individual

## 2023-04-07 NOTE — PROGRESS NOTES
[facility] Dermatology Note  Encounter Date: Apr 11, 2023  Office Visit     Dermatology Problem List:  0. NUB  - L frontal scalp s/p shave biopsy 4/11/2023  - R axilla s/p shave biopsy 4/11/2023  1. History of NMSC  - BCC, L forehead s/p MMS 9/14/2017  - BCC, L forearm s/p MMS 9/14/2017  2. History of atypical nevus  - Mild atypia, L abdomen s/p shave biopsy 5/14/2018  3. AKs s/p cryotherapy     Last FBSE: 4/11/2023  ____________________________________________    Assessment & Plan:    # NUB, L frontal scalp ddx r/o recurrent BCC   # NUB, R axilla ddx inflamed acrochordon  - Shave biopsy today; see procedure note below.    # AK, L temple x1, scalp x4  # Inflamed acrochordons, L axilla x2  - Cryotherapy today; see procedure note below.    # Solar lentigines  # Multiple benign nevi  - No concerning lesions today  - Monitor for ABCDEs of melanoma   - Continue sun protection - recommend SPF 30 or higher with frequent application   - Return sooner if noticing changing or symptomatic lesions     # Cherry angiomas  # Seborrheic keratoses  Discussed the natural history and benign nature of this lesion. Reassurance provided that no additional treatment is necessary.     # History of NMSC. Possible evidence of recurrent disease.  # History of atypical nevus.  - Continue photoprotection - recommend SPF 30 or higher with frequent reapplication  - Continue yearly skin exams  - Advised to monitor for changing, non-healing, bleeding, painful, changing, or otherwise symptomatic lesions     Procedures Performed:   - Shave biopsy procedure note, location(s): see above. After discussion of benefits and risks including but not limited to bleeding, infection, scar, incomplete removal, recurrence, and non-diagnostic biopsy, written consent and photographs were obtained. The area was cleaned with isopropyl alcohol. 0.5mL of 1% lidocaine with epinephrine was injected to obtain adequate anesthesia of lesion(s). Shave  biopsy at site(s) performed. Hemostasis was achieved with aluminium chloride. Petrolatum ointment and a sterile dressing were applied. The patient tolerated the procedure and no complications were noted. The patient was provided with verbal and written post care instructions.   - Cryotherapy procedure note, location(s): see above. After verbal consent and discussion of risks and benefits including, but not limited to, dyspigmentation/scar, blister, and pain, 7 lesion(s) was(were) treated with 1-2 mm freeze border for 1-2 cycles with liquid nitrogen. Post cryotherapy instructions were provided.    Follow-up: 6 month(s) in-person, or earlier pending pathology or for new or changing lesions    Staff and Scribe:     Scribe Disclosure:  I, Milton Levin, am serving as a scribe to document services personally performed by Delores Stout PA-C based on data collection and the provider's statements to me.     Provider Disclosure:   The documentation recorded by the scribe accurately reflects the services I personally performed and the decisions made by me.    All risks, benefits and alternatives were discussed with patient.  Patient is in agreement and understands the assessment and plan.  All questions were answered.  Sun Screen Education was given.   Return to Clinic in 6 months or sooner as needed.   Delores Stout PA-C   [facility]   ____________________________________________    CC: Skin Check (FBSC. BCC on top of head not healing occasional bleeding. ) and Skin Tags (Bilateral under arms wants to be removed )    HPI:  Mr. Elliot Norris is a(n) 62 year old male who presents today as a return patient for a FBSE. Last seen in dermatology by me on 10/11/2022, at which time patient received cryotherapy for treatment of AKs on the scalp and L temple and an ISK on the L frontal scalp.    Today, patient notes that the MMS scar on his L forehead has been bleeding within the last  month, with no identified trauma. He also has some irritating skin tags under each armpit that he would like removed.    Patient is otherwise feeling well, without additional skin concerns.    Labs Reviewed:  N/A    Physical Exam:  Vitals: There were no vitals taken for this visit.  SKIN: Total skin excluding the undergarment areas was performed. The exam included the head/face, neck, both arms, chest, back, abdomen, both legs, digits and/or nails.   - There are erythematous macules with overyling adherent scale on the L temple x1, scalp x4.  - 8 mm pink thin pearly papule on the distal suture of the L frontal scalp.  - There is(are) skin colored pedunculated papules with erythema on the R axilla x1, L axilla x2.  - There are dome shaped bright red papules on the trunk and extremities.   - Multiple regular brown pigmented macules and papules are identified on the trunk and extremities.   - Scattered brown macules on sun exposed areas.  - There are waxy stuck on tan to brown papules on the scalp, trunk and extremities.  - No other lesions of concern on areas examined.             Medications:  Current Outpatient Medications   Medication     Vitamin D, Cholecalciferol, 25 MCG (1000 UT) CAPS     atorvastatin (LIPITOR) 20 MG tablet     losartan (COZAAR) 25 MG tablet     No current facility-administered medications for this visit.      Past Medical History:   Patient Active Problem List   Diagnosis     ED (erectile dysfunction)     Hyperlipidemia LDL goal <160     Fatty liver     Insomnia     Obesity     Hypertension goal BP (blood pressure) < 140/90     Prediabetes     History of basal cell carcinoma     Scar     Depression, unspecified     Past Medical History:   Diagnosis Date     Arthritis 2016    A little bit.     Basal cell carcinoma      Cancer (H) 2009    Skin.     Depressive disorder     Occasionally, mild.     Diabetes (H)     Borderline     Hypertension 2016    About a year.        CC No referring provider  defined for this encounter. on close of this encounter.

## 2023-04-11 ENCOUNTER — OFFICE VISIT (OUTPATIENT)
Dept: FAMILY MEDICINE | Facility: CLINIC | Age: 63
End: 2023-04-11
Payer: COMMERCIAL

## 2023-04-11 DIAGNOSIS — Z87.898 HISTORY OF ATYPICAL NEVUS: ICD-10-CM

## 2023-04-11 DIAGNOSIS — L82.1 SEBORRHEIC KERATOSIS: ICD-10-CM

## 2023-04-11 DIAGNOSIS — D49.2 NEOPLASM OF UNSPECIFIED BEHAVIOR OF BONE, SOFT TISSUE, AND SKIN: ICD-10-CM

## 2023-04-11 DIAGNOSIS — D18.01 CHERRY ANGIOMA: ICD-10-CM

## 2023-04-11 DIAGNOSIS — D22.9 MULTIPLE BENIGN NEVI: ICD-10-CM

## 2023-04-11 DIAGNOSIS — L91.8 INFLAMED ACROCHORDON: ICD-10-CM

## 2023-04-11 DIAGNOSIS — L57.0 ACTINIC KERATOSIS: ICD-10-CM

## 2023-04-11 DIAGNOSIS — Z12.83 SKIN CANCER SCREENING: Primary | ICD-10-CM

## 2023-04-11 DIAGNOSIS — L81.4 SOLAR LENTIGO: ICD-10-CM

## 2023-04-11 PROCEDURE — 88342 IMHCHEM/IMCYTCHM 1ST ANTB: CPT | Performed by: DERMATOLOGY

## 2023-04-11 PROCEDURE — 11200 RMVL SKIN TAGS UP TO&INC 15: CPT | Mod: 59 | Performed by: PHYSICIAN ASSISTANT

## 2023-04-11 PROCEDURE — 99213 OFFICE O/P EST LOW 20 MIN: CPT | Mod: 25 | Performed by: PHYSICIAN ASSISTANT

## 2023-04-11 PROCEDURE — 17000 DESTRUCT PREMALG LESION: CPT | Mod: 59 | Performed by: PHYSICIAN ASSISTANT

## 2023-04-11 PROCEDURE — 11102 TANGNTL BX SKIN SINGLE LES: CPT | Performed by: PHYSICIAN ASSISTANT

## 2023-04-11 PROCEDURE — 88305 TISSUE EXAM BY PATHOLOGIST: CPT | Performed by: DERMATOLOGY

## 2023-04-11 PROCEDURE — 17003 DESTRUCT PREMALG LES 2-14: CPT | Mod: 59 | Performed by: PHYSICIAN ASSISTANT

## 2023-04-11 PROCEDURE — 11103 TANGNTL BX SKIN EA SEP/ADDL: CPT | Performed by: PHYSICIAN ASSISTANT

## 2023-04-11 PROCEDURE — 88341 IMHCHEM/IMCYTCHM EA ADD ANTB: CPT | Performed by: DERMATOLOGY

## 2023-04-11 RX ORDER — FAMOTIDINE 20 MG
TABLET ORAL DAILY
COMMUNITY
End: 2024-01-11 | Stop reason: DRUGHIGH

## 2023-04-11 ASSESSMENT — PAIN SCALES - GENERAL: PAINLEVEL: NO PAIN (0)

## 2023-04-11 NOTE — PATIENT INSTRUCTIONS
Wound Care After a Biopsy    What is a skin biopsy?  A skin biopsy allows the doctor to examine a very small piece of tissue under the microscope to determine the diagnosis and the best treatment for the skin condition. A local anesthetic (numbing medicine)  is injected with a very small needle into the skin area to be tested. A small piece of skin is taken from the area. Sometimes a suture (stitch) is used.     What are the risks of a skin biopsy?  I will experience scar, bleeding, swelling, pain, crusting and redness. I may experience incomplete removal or recurrence. Risks of this procedure are excessive bleeding, bruising, infection, nerve damage, numbness, thick (hypertrophic or keloidal) scar and non-diagnostic biopsy.    How should I care for my wound for the first 24 hours?  Keep the wound dry and covered for 24 hours  If it bleeds, hold direct pressure on the area for 15 minutes. If bleeding does not stop then go to the emergency room  Avoid strenuous exercise the first 1-2 days or as your doctor instructs you    How should I care for the wound after 24 hours?  After 24 hours, remove the bandage  You may bathe or shower as normal  If you had a scalp biopsy, you can shampoo as usual and can use shower water to clean the biopsy site daily  Clean the wound twice a day with gentle soap and water  Do not scrub, be gentle  Apply white petroleum/Vaseline after cleaning the wound with a cotton swab or a clean finger, and keep the site covered with a Bandaid /bandage. Bandages are not necessary with a scalp biopsy  If you are unable to cover the site with a Bandaid /bandage, re-apply ointment 2-3 times a day to keep the site moist. Moisture will help with healing  Avoid strenuous activity for first 1-2 days  Avoid lakes, rivers, pools, and oceans until the stitches are removed or the site is healed    How do I clean my wound?  Wash hands thoroughly with soap or use hand  before all wound care  Clean the  wound with gentle soap and water  Apply white petroleum/Vaseline  to wound after it is clean  Replace the Bandaid /bandage to keep the wound covered for the first few days or as instructed by your doctor  If you had a scalp biopsy, warm shower water to the area on a daily basis should suffice    What should I use to clean my wound?   Cotton-tipped applicators (Qtips )  White petroleum jelly (Vaseline ). Use a clean new container and use Q-tips to apply.  Bandaids   as needed  Gentle soap     How should I care for my wound long term?  Do not get your wound dirty  Keep up with wound care for one week or until the area is healed.  A small scab will form and fall off by itself when the area is completely healed. The area will be red and will become pink in color as it heals. Sun protection is very important for how your scar will turn out. Sunscreen with an SPF 30 or greater is recommended once the area is healed.  You should have some soreness but it should be mild and slowly go away over several days. Talk to your doctor about using tylenol for pain,    When should I call my doctor?  If you have increased:   Pain or swelling  Pus or drainage (clear or slightly yellow drainage is ok)  Temperature over 100F  Spreading redness or warmth around wound    When will I hear about my results?  The biopsy results can take 2 weeks to come back.  Your results will automatically release to [employer] before your provider has even reviewed them.  The clinic will call you with the results, send you a View3 message, or have you schedule a follow-up clinic or phone time to discuss the results.  Contact our clinics if you do not hear from us in 2 weeks.    Who should I call with questions?  [facility]: [phone]  [facility]: [phone]  For urgent needs outside of business hours call the [facility] at [phone] and ask for the dermatology resident on call        Patient Education     Checking for Skin Cancer  You can find cancer early by checking your skin each month. There are 3 kinds of skin cancer. They are melanoma, basal cell carcinoma, and squamous cell carcinoma. Doing monthly skin checks is the best way to find new marks or skin changes. Follow the instructions below for checking your skin.   The ABCDEs of checking moles for melanoma   Check your moles or growths for signs of melanoma using ABCDE:   Asymmetry: the sides of the mole or growth don t match  Border: the edges are ragged, notched, or blurred  Color: the color within the mole or growth varies  Diameter: the mole or growth is larger than 6 mm (size of a pencil eraser)  Evolving: the size, shape, or color of the mole or growth is changing (evolving is not shown in the images below)    Checking for other types of skin cancer  Basal cell carcinoma or squamous cell carcinoma have symptoms such as:     A spot or mole that looks different from all other marks on your skin  Changes in how an area feels, such as itching, tenderness, or pain  Changes in the skin's surface, such as oozing, bleeding, or scaliness  A sore that does not heal  New swelling or redness beyond the border of a mole    Who s at risk?  Anyone can get skin cancer. But you are at greater risk if you have:   Fair skin, light-colored hair, or light-colored eyes  Many moles or abnormal moles on your skin  A history of sunburns from sunlight or tanning beds  A family history of skin cancer  A history of exposure to radiation or chemicals  A weakened immune system  If you have had skin cancer in the past, you are at risk for recurring skin cancer.   How to check your skin  Do your monthly skin checkups in front of a full-length mirror. Check all parts of your body, including your:   Head (ears, face, neck, and scalp)  Torso (front, back, and sides)  Arms (tops, undersides, upper, and lower armpits)  Hands (palms, backs, and fingers, including  under the nails)  Buttocks and genitals  Legs (front, back, and sides)  Feet (tops, soles, toes, including under the nails, and between toes)  If you have a lot of moles, take digital photos of them each month. Make sure to take photos both up close and from a distance. These can help you see if any moles change over time.   Most skin changes are not cancer. But if you see any changes in your skin, call your doctor right away. Only he or she can diagnose a problem. If you have skin cancer, seeing your doctor can be the first step toward getting the treatment that could save your life.   Tail last reviewed this educational content on 4/1/2019 2000-2020 [employer]. [address] [place] [address], [place] [address]. All rights reserved. This information is not intended as a substitute for professional medical care. Always follow your healthcare professional's instructions.       When should I call my doctor?  If you are worsening or not improving, please, contact us or seek urgent care as noted below.     Who should I call with questions (adults)?  [facility] (adult and pediatric): [phone]  [facility] (adult): [phone]  For urgent needs outside of business hours call the [facility] at [phone] and ask for the dermatology resident on call to be paged  If this is a medical emergency and you are unable to reach an ER, Call [phone]    Who should I call with questions (pediatric)?  [facility]- Pediatric Dermatology  Dr. Rae Chamberlain, Dr. Avi Combs, Dr. Linda Ward, LINDA Bowman, Dr. Magdalena Porter, Dr. Naomi Arguello & Dr. Mike Arisa  Non-urgent nurse triage line; [phone]- Loan and Kena KELLER Care Coordinatorgeorgia Casarez (/Complex ) [phone]    If you need a prescription refill, please contact your pharmacy. Refills are approved or denied by our  Physicians during normal business hours, Monday through Fridays  Per office policy, refills will not be granted if you have not been seen within the past year (or sooner depending on your child's condition)    Scheduling Information:  Pediatric Appointment Scheduling and Call Center [phone]  Radiology Scheduling- [phone]  Sedation Unit Scheduling- [phone]  [place] Scheduling- General [phone]; Pediatric Dermatology [phone]  Main  Services: [phone]  Armenian: [phone]  American: [phone]  Hmong/Spanish/Maltese: [phone]  Preadmission Nursing Department Fax Number: [phone] (Fax all pre-operative paperwork to this number)    For urgent matters arising during evenings, weekends, or holidays that cannot wait for normal business hours please call [phone] and ask for the dermatology resident on call to be paged.

## 2023-04-11 NOTE — LETTER
4/11/2023         RE: Elliot Norris  [address]  [facility] [address]        Dear Colleague,    Thank you for referring your patient, Elliot Norris, to the [facility] [place]. Please see a copy of my visit note below.    [facility] Dermatology Note  Encounter Date: Apr 11, 2023  Office Visit     Dermatology Problem List:  0. NUB  - L frontal scalp s/p shave biopsy 4/11/2023  - R axilla s/p shave biopsy 4/11/2023  1. History of NMSC  - BCC, L forehead s/p MMS 9/14/2017  - BCC, L forearm s/p MMS 9/14/2017  2. History of atypical nevus  - Mild atypia, L abdomen s/p shave biopsy 5/14/2018  3. AKs s/p cryotherapy     Last FBSE: 4/11/2023  ____________________________________________    Assessment & Plan:    # NUB, L frontal scalp ddx r/o recurrent BCC   # NUB, R axilla ddx inflamed acrochordon  - Shave biopsy today; see procedure note below.    # AK, L temple x1, scalp x4  # Inflamed acrochordons, L axilla x2  - Cryotherapy today; see procedure note below.    # Solar lentigines  # Multiple benign nevi  - No concerning lesions today  - Monitor for ABCDEs of melanoma   - Continue sun protection - recommend SPF 30 or higher with frequent application   - Return sooner if noticing changing or symptomatic lesions     # Cherry angiomas  # Seborrheic keratoses  Discussed the natural history and benign nature of this lesion. Reassurance provided that no additional treatment is necessary.     # History of NMSC. Possible evidence of recurrent disease.  # History of atypical nevus.  - Continue photoprotection - recommend SPF 30 or higher with frequent reapplication  - Continue yearly skin exams  - Advised to monitor for changing, non-healing, bleeding, painful, changing, or otherwise symptomatic lesions     Procedures Performed:   - Shave biopsy procedure note, location(s): see above. After discussion of benefits and risks including but not limited to bleeding,  infection, scar, incomplete removal, recurrence, and non-diagnostic biopsy, written consent and photographs were obtained. The area was cleaned with isopropyl alcohol. 0.5mL of 1% lidocaine with epinephrine was injected to obtain adequate anesthesia of lesion(s). Shave biopsy at site(s) performed. Hemostasis was achieved with aluminium chloride. Petrolatum ointment and a sterile dressing were applied. The patient tolerated the procedure and no complications were noted. The patient was provided with verbal and written post care instructions.   - Cryotherapy procedure note, location(s): see above. After verbal consent and discussion of risks and benefits including, but not limited to, dyspigmentation/scar, blister, and pain, 7 lesion(s) was(were) treated with 1-2 mm freeze border for 1-2 cycles with liquid nitrogen. Post cryotherapy instructions were provided.    Follow-up: 6 month(s) in-person, or earlier pending pathology or for new or changing lesions    Staff and Scribe:     Scribe Disclosure:  IMilton, am serving as a scribe to document services personally performed by Delores Stout PA-C based on data collection and the provider's statements to me.     Provider Disclosure:   The documentation recorded by the scribe accurately reflects the services I personally performed and the decisions made by me.    All risks, benefits and alternatives were discussed with patient.  Patient is in agreement and understands the assessment and plan.  All questions were answered.  Sun Screen Education was given.   Return to Clinic in 6 months or sooner as needed.   Delores Stout PA-C   [facility]   ____________________________________________    CC: Skin Check (FBSC. BCC on top of head not healing occasional bleeding. ) and Skin Tags (Bilateral under arms wants to be removed )    HPI:  Mr. Elliot Norris is a(n) 62 year old male who presents today as a return patient for a  FBSE. Last seen in dermatology by me on 10/11/2022, at which time patient received cryotherapy for treatment of AKs on the scalp and L temple and an ISK on the L frontal scalp.    Today, patient notes that the MMS scar on his L forehead has been bleeding within the last month, with no identified trauma. He also has some irritating skin tags under each armpit that he would like removed.    Patient is otherwise feeling well, without additional skin concerns.    Labs Reviewed:  N/A    Physical Exam:  Vitals: There were no vitals taken for this visit.  SKIN: Total skin excluding the undergarment areas was performed. The exam included the head/face, neck, both arms, chest, back, abdomen, both legs, digits and/or nails.   - There are erythematous macules with overyling adherent scale on the L temple x1, scalp x4.  - 8 mm pink thin pearly papule on the distal suture of the L frontal scalp.  - There is(are) skin colored pedunculated papules with erythema on the R axilla x1, L axilla x2.  - There are dome shaped bright red papules on the trunk and extremities.   - Multiple regular brown pigmented macules and papules are identified on the trunk and extremities.   - Scattered brown macules on sun exposed areas.  - There are waxy stuck on tan to brown papules on the scalp, trunk and extremities.  - No other lesions of concern on areas examined.             Medications:  Current Outpatient Medications   Medication     Vitamin D, Cholecalciferol, 25 MCG (1000 UT) CAPS     atorvastatin (LIPITOR) 20 MG tablet     losartan (COZAAR) 25 MG tablet     No current facility-administered medications for this visit.      Past Medical History:   Patient Active Problem List   Diagnosis     ED (erectile dysfunction)     Hyperlipidemia LDL goal <160     Fatty liver     Insomnia     Obesity     Hypertension goal BP (blood pressure) < 140/90     Prediabetes     History of basal cell carcinoma     Scar     Depression, unspecified     Past Medical  History:   Diagnosis Date     Arthritis 2016    A little bit.     Basal cell carcinoma      Cancer (H) 2009    Skin.     Depressive disorder     Occasionally, mild.     Diabetes (H)     Borderline     Hypertension 2016    About a year.        CC No referring provider defined for this encounter. on close of this encounter.      Again, thank you for allowing me to participate in the care of your patient.        Sincerely,        Delores Stout PA-C

## 2023-04-13 LAB
PATH REPORT.COMMENTS IMP SPEC: ABNORMAL
PATH REPORT.COMMENTS IMP SPEC: YES
PATH REPORT.FINAL DX SPEC: ABNORMAL
PATH REPORT.GROSS SPEC: ABNORMAL
PATH REPORT.MICROSCOPIC SPEC OTHER STN: ABNORMAL
PATH REPORT.RELEVANT HX SPEC: ABNORMAL

## 2023-04-14 ENCOUNTER — TELEPHONE (OUTPATIENT)
Dept: FAMILY MEDICINE | Facility: CLINIC | Age: 63
End: 2023-04-14
Payer: COMMERCIAL

## 2023-04-14 ENCOUNTER — MYC MEDICAL ADVICE (OUTPATIENT)
Dept: FAMILY MEDICINE | Facility: CLINIC | Age: 63
End: 2023-04-14
Payer: COMMERCIAL

## 2023-04-14 DIAGNOSIS — D04.4 SQUAMOUS CELL CARCINOMA IN SITU OF SCALP: Primary | ICD-10-CM

## 2023-04-14 NOTE — TELEPHONE ENCOUNTER
Patient would like to go to [place] - he would like a consult first- he had a bad experience with the last mohs surgeon and would liek to discuss option with the mohs surgeon.    Thank you,    Farrah JOSEPHRN BSN  [facility] Dermatology- [phone]

## 2023-04-14 NOTE — TELEPHONE ENCOUNTER
----- Message from Delores Stout PA-C sent at 4/14/2023 10:58 AM CDT -----  Please follow up with Vikram for where he would like to do his Moh's.    (SCC insitu left frontal scalp) I can refer to his preferred surgical site ([facility]) if he prefers to not to go to [place] or [place]    Thanks,   Delores Stout PA-C     The spot biopsied on the Left frontal scalp is a different type of skin cancer  (not basal) called a squamous cell carcinoma (insitu). On the face, scalp and hands we recommend Moh;s for this for the most cosmetically pleasing result and to ensure the skin cancer has been removed.   I can refer you to go the the [facility] or [place] to see either Dr Dinh or Dr Limon. Let us know if that is your preference.     The spot on the right axilla was a benign Acrochordon (skin tag). No concerning features. I hope all of your spots are healing well.

## 2023-04-14 NOTE — TELEPHONE ENCOUNTER
Called patient and discussed options- see telephone call.    Thank you,    Farrah JOSEPHRN BSN  [facility] Dermatology- [phone]

## 2023-04-17 ENCOUNTER — TELEPHONE (OUTPATIENT)
Dept: DERMATOLOGY | Facility: CLINIC | Age: 63
End: 2023-04-17
Payer: COMMERCIAL

## 2023-04-17 NOTE — TELEPHONE ENCOUNTER
Left patient a voicemail to schedule a consult & mohs for SCCis left frontal scalp with Dr. Limon or Dr. Dinh in [facility] or [facility]. Provided my direct phone number.    Catalina Michelle on 4/17/2023 at 11:26 AM

## 2023-04-18 NOTE — TELEPHONE ENCOUNTER
Called patient to schedule surgery with Dr. Limon    Date of Surgery: TBD    Surgery type: mohs    Consult scheduled: Yes    Has patient had mohs with us before? No    Additional comments: pt requested to have a consult prior to scheduling mohs.       Catalina Michelle on 4/18/2023 at 1:23 PM

## 2023-04-26 NOTE — TELEPHONE ENCOUNTER
FUTURE VISIT INFORMATION      FUTURE VISIT INFORMATION:    Date: 5.23.23    Time: 3:00    Location: [facility]  REFERRAL INFORMATION:    Referring provider:  Arg[place]    Referring providers clinic:  Derm    Reason for visit/diagnosis  SCCis left frontal scalp. mohs not scheduled yet.    RECORDS REQUESTED FROM:       Clinic name Comments Records Status Imaging Status   Derm 4.11.23  Path # [id number] Epic Epic

## 2023-05-23 ENCOUNTER — PRE VISIT (OUTPATIENT)
Dept: DERMATOLOGY | Facility: CLINIC | Age: 63
End: 2023-05-23

## 2023-05-23 ENCOUNTER — OFFICE VISIT (OUTPATIENT)
Dept: DERMATOLOGY | Facility: CLINIC | Age: 63
End: 2023-05-23
Payer: COMMERCIAL

## 2023-05-23 DIAGNOSIS — L90.5 SCAR: ICD-10-CM

## 2023-05-23 DIAGNOSIS — D04.4 SQUAMOUS CELL CARCINOMA IN SITU (SCCIS) OF SCALP: Primary | ICD-10-CM

## 2023-05-23 PROCEDURE — 99213 OFFICE O/P EST LOW 20 MIN: CPT | Mod: GC | Performed by: DERMATOLOGY

## 2023-05-23 RX ORDER — DIPHENHYDRAMINE HCL 25 MG
25 TABLET ORAL EVERY 6 HOURS PRN
COMMUNITY
End: 2024-05-21

## 2023-05-23 ASSESSMENT — PAIN SCALES - GENERAL: PAINLEVEL: NO PAIN (0)

## 2023-05-23 NOTE — LETTER
5/23/2023       RE: Elliot Norris  [address]  [facility] [address]     Dear Colleague,    Thank you for referring your patient, Elliot Norris, to the [facility] DERMATOLOG[facility] SURG[facility]IC [place] [facility]. Please see a copy of my visit note below.    Mohs Micrographic Surgery Consult Note    May 23, 2023    Dermatology Problem List:  1. NMSC  - SCCis, left frontal scalp, s/p shave bx 4/11/23  - BCC, forehead, s/p MMS 9/14/2017  - BCC, left forearm, s/p MMS 9/14/2017  - Hx BCC, nose  2. Dysplastic nevi  - mild atypia, left abdomen, s/p shave bx 5/14/2018  - mild atypia, back midline at t4, s/p shave bx 5/14/2018  3. AKs s/p cryotherapy  4. Fibroepithelial polyp (acrochordon), right axilla, s/p shave bx 4/11/2023    Subjective: The patient is a 62 year old man who presents today for Mohs micrographic surgery consultation for a recent diagnosis of skin cancer.    Skin cancer(s): SCCis  Location(s): left frontal scalp  Associated symptoms: none  Onset: within last 1 year    The patient states he still feels some tingling after his procedure. He notes that he accepts surgery can change things, but he is still coping with it. He is concerned about the his scalp and how the skin transformed over time. He denies taking blood thinners. He does occasional swimming and weight lifting for exercise.     No other associated symptoms, modifying factors, or prior treatments, except when noted above. The patient denies any constitutional symptoms, lymphadenopathy, unintentional weight loss or decreased appetite. No other skin concerns today.    Objective:   Gen: This is a well appearing individual in no acute distress. The patient is alert and oriented x 3.  An exam of the scalp was performed today and visualized the following:  - 2 cm pink scaly patch on L frontal scalp  - Well-healed white linear scar on L frontal scalp adjacent to  tumor    Assessment and Plan:     1. Plan for Mohs micrographic surgery for skin cancer(s) above:  - We discussed the nature of the diagnosis/condition above. We discussed the treatment options, including the risks benefits and expectations of these options. We recommend micrographic surgery as the most effective and most tissue sparing option for treatment, and the patient agrees to proceed with this.  The patient is aware of the risks, benefits and expectations of this procedure. The patient will be scheduled for this procedure, if not already done so.  - We anticipate the following closure type: Linear closure, such as complex linear closure (CLC)    2. Scar, L frontal scalp, from prior Mohs surgery for BCC  - Patient interested in scar revision d/t appearance. Advised patient that his concern for his skin area is more of a plastic surgery concern and he would need to consult them for the concern.   - Can consider steroid injections at a later time. Advised patient to wait till winter for laser- as the scalp now is tan.     The patient was discussed with and evaluated by attending physician, Ollie Limon DO.    Leonardo Brewster MD  Dermatology, PGY-5  Mohs surgery fellow    Scribe Disclosure:  Scribe Disclosure:   I, Polly Mi, am serving as a scribe; to document services personally performed by Ollie Limon MD -based on data collection and the provider's statements to me.     Provider Disclosure:  I agree with above History, Review of Systems, Physical exam and Plan.  I have reviewed the content of the documentation and have edited it as needed. I have personally performed the services documented here and the documentation accurately represents those services and the decisions I have made.      Staff Physician Comments:   I saw and evaluated the patient with the Mohs Surgery Fellow (Dr. Leonardo Brewster) and I agree with the assessment and plan as documented by the scribe. I was present for the entire  exam.    Ollie Limon DO    Department of Dermatology  [facility]: Phone: [phone], Fax:[phone]  [facility]: Phone: [phone], Fax: [phone]

## 2023-05-23 NOTE — PROGRESS NOTES
Mohs Micrographic Surgery Consult Note    May 23, 2023    Dermatology Problem List:  1. NMSC  - SCCis, left frontal scalp, s/p shave bx 4/11/23  - BCC, forehead, s/p MMS 9/14/2017  - BCC, left forearm, s/p MMS 9/14/2017  - Hx BCC, nose  2. Dysplastic nevi  - mild atypia, left abdomen, s/p shave bx 5/14/2018  - mild atypia, back midline at t4, s/p shave bx 5/14/2018  3. AKs s/p cryotherapy  4. Fibroepithelial polyp (acrochordon), right axilla, s/p shave bx 4/11/2023    Subjective: The patient is a 62 year old man who presents today for Mohs micrographic surgery consultation for a recent diagnosis of skin cancer.    Skin cancer(s): SCCis  Location(s): left frontal scalp  Associated symptoms: none  Onset: within last 1 year    The patient states he still feels some tingling after his procedure. He notes that he accepts surgery can change things, but he is still coping with it. He is concerned about the his scalp and how the skin transformed over time. He denies taking blood thinners. He does occasional swimming and weight lifting for exercise.     No other associated symptoms, modifying factors, or prior treatments, except when noted above. The patient denies any constitutional symptoms, lymphadenopathy, unintentional weight loss or decreased appetite. No other skin concerns today.    Objective:   Gen: This is a well appearing individual in no acute distress. The patient is alert and oriented x 3.  An exam of the scalp was performed today and visualized the following:  - 2 cm pink scaly patch on L frontal scalp  - Well-healed white linear scar on L frontal scalp adjacent to tumor    Assessment and Plan:     1. Plan for Mohs micrographic surgery for skin cancer(s) above:  - We discussed the nature of the diagnosis/condition above. We discussed the treatment options, including the risks benefits and expectations of these options. We recommend micrographic surgery as the most effective and most tissue sparing option for  treatment, and the patient agrees to proceed with this.  The patient is aware of the risks, benefits and expectations of this procedure. The patient will be scheduled for this procedure, if not already done so.  - We anticipate the following closure type: Linear closure, such as complex linear closure (CLC)    2. Scar, L frontal scalp, from prior Mohs surgery for BCC  - Patient interested in scar revision d/t appearance. Advised patient that his concern for his skin area is more of a plastic surgery concern and he would need to consult them for the concern.   - Can consider steroid injections at a later time. Advised patient to wait till winter for laser- as the scalp now is tan.     The patient was discussed with and evaluated by attending physician, Ollie Limon DO.    Leonardo Brewster MD  Dermatology, PGY-5  Mohs surgery fellow    Scribe Disclosure:  Scribe Disclosure:   I, Polly Mi, am serving as a scribe; to document services personally performed by Ollie Limon MD -based on data collection and the provider's statements to me.     Provider Disclosure:  I agree with above History, Review of Systems, Physical exam and Plan.  I have reviewed the content of the documentation and have edited it as needed. I have personally performed the services documented here and the documentation accurately represents those services and the decisions I have made.      Staff Physician Comments:   I saw and evaluated the patient with the Mohs Surgery Fellow (Dr. Leonardo Brewster) and I agree with the assessment and plan as documented by the scribe. I was present for the entire exam.    Ollie Limon DO    Department of Dermatology  [facility]: Phone: [phone], Fax:[phone]  [facility] [place]: Phone: [phone], Fax: [phone]

## 2023-05-23 NOTE — NURSING NOTE
Chief Complaint   Patient presents with     Derm Problem     Patient is here today for Mohs consult regarding SCCis on frontal scalp.     Pari BHANDARI RN

## 2023-06-27 ENCOUNTER — TELEPHONE (OUTPATIENT)
Dept: DERMATOLOGY | Facility: CLINIC | Age: 63
End: 2023-06-27

## 2023-06-27 NOTE — TELEPHONE ENCOUNTER
[facility] Call Center    Phone Message    May a detailed message be left on voicemail: yes     Reason for Call: Appointment Intake    Referring Provider Name: NA  Diagnosis and/or Symptoms: Mohs - SCC is left frontal scalp. pt request gown.  Pt was scheduled this morning for mohs.   Pt had an emergency and is taking his father in law the the ED. Pt will need to reschedule and is very sorry for the short notice. Please call pt back to reschedule. Thanks     Action Taken: Message routed to:  [facility] ([facility] DERM SURG [facility]    Travel Screening: Not Applicable

## 2023-06-28 NOTE — TELEPHONE ENCOUNTER
Pt rescheduled for first available at the [facility] office.     Catalina Michelle, Procedure  6/28/2023 2:42 PM

## 2023-06-30 DIAGNOSIS — I10 PRIMARY HYPERTENSION: ICD-10-CM

## 2023-07-07 ENCOUNTER — ALLIED HEALTH/NURSE VISIT (OUTPATIENT)
Dept: FAMILY MEDICINE | Facility: CLINIC | Age: 63
End: 2023-07-07
Payer: COMMERCIAL

## 2023-07-07 VITALS — DIASTOLIC BLOOD PRESSURE: 90 MMHG | SYSTOLIC BLOOD PRESSURE: 132 MMHG

## 2023-07-07 DIAGNOSIS — Z01.30 BP CHECK: Primary | ICD-10-CM

## 2023-07-07 PROCEDURE — 99207 PR NO CHARGE NURSE ONLY: CPT

## 2023-07-07 RX ORDER — LOSARTAN POTASSIUM 25 MG/1
25 TABLET ORAL DAILY
Qty: 30 TABLET | Refills: 1 | Status: SHIPPED | OUTPATIENT
Start: 2023-07-07 | End: 2023-09-05

## 2023-07-07 NOTE — TELEPHONE ENCOUNTER
Routing refill request to provider for review/approval because:  Patient had nurse only BP check today.  Please advise.  Mis RODRIGUEZ RN

## 2023-07-07 NOTE — NURSING NOTE
Elliot Norris is a 63 year old patient who comes in today for a Blood Pressure check.  Initial BP:  BP (!) 132/90 (BP Location: Right arm, Patient Position: Sitting, Cuff Size: Adult Large)      Data Unavailable  Disposition: results routed to provider    Patient was in a rush and did not want to wait for RN nurse to go into the room.     Marina Melvin MA

## 2023-07-27 ENCOUNTER — OFFICE VISIT (OUTPATIENT)
Dept: DERMATOLOGY | Facility: CLINIC | Age: 63
End: 2023-07-27
Payer: COMMERCIAL

## 2023-07-27 VITALS — DIASTOLIC BLOOD PRESSURE: 81 MMHG | SYSTOLIC BLOOD PRESSURE: 136 MMHG | HEART RATE: 82 BPM

## 2023-07-27 DIAGNOSIS — D04.4 SQUAMOUS CELL CARCINOMA IN SITU OF SCALP: ICD-10-CM

## 2023-07-27 PROCEDURE — 17311 MOHS 1 STAGE H/N/HF/G: CPT | Mod: GC | Performed by: DERMATOLOGY

## 2023-07-27 PROCEDURE — 12032 INTMD RPR S/A/T/EXT 2.6-7.5: CPT | Mod: GC | Performed by: DERMATOLOGY

## 2023-07-27 ASSESSMENT — PAIN SCALES - GENERAL: PAINLEVEL: NO PAIN (1)

## 2023-07-27 NOTE — PATIENT INSTRUCTIONS
Mohs Wound Care Instructions  I will experience scar, altered skin color, bleeding, swelling, pain, crusting and redness. I may experience altered sensation. Risks are excessive bleeding, infection, muscle weakness, thick (hypertrophic or keloidal) scar, and recurrence. A second procedure may be recommended to obtain the best cosmetic or functional result.  Possible complications of any surgical procedure are bleeding, infection, scarring, alteration in skin color and sensation, muscle weakness in the area, wound dehiscence or seperation, or recurrence of the lesion or disease. On occasion, after healing, a secondary procedure or revision may be recommended in order to obtain the best cosmetic or functional result.   After your surgery, a pressure bandage will be placed over the area that has sutures. This will help prevent bleeding. Please follow these instructions as they will help you to prevent complications as your wound heals.  For the First 48 hours After Surgery:  Leave the pressure bandage on and keep it dry. If it should come loose, you may retape it, but do not take it off.  Relax and take it easy. Do not do any vigorous exercise, heavy lifting, or bending forward. This could cause the wound to bleed.  Post-operative pain is usually mild. You may alternate between 1000 mg of Tylenol (acetaminophen) and 400 mg of Ibuprofen every 4 hours.  Do not take more than 4,000mg of acetaminophen in a 24 hour period or 3200 mg of Ibuprofen in a 24 hr period.  Avoid alcohol and vitamin E as these may increase your tendency to bleed.  You may put an ice pack around the bandaged area for 20 minutes every 2-3 hours. This may help reduce swelling, bruising, and pain. Make sure the ice pack is waterproof so that the pressure bandage does not get wet.   You may see a small amount of drainage or blood on your pressure bandage. This is normal. However, if drainage or bleeding continues or saturates the bandage, you will need  to apply firm pressure over the bandage with a washcloth for 15 minutes. If bleeding continues after applying pressure for 15 minutes then go to the nearest emergency room.  48 Hours After Surgery  Carefully remove the bandage and start daily wound care and dressing changes. You may also now shower and get the wound wet.  Daily Wound Care:  Wash wound with a mild soap and water.  Use caution when washing the wound, be gentle and do not let the forceful shower stream hit the wound directly.  Pat dry.  Apply Vaseline (from a new container or tube) over the suture line with a Q-tip. It is very important to keep the wound continuously moist, as wounds heal best in a moist environment.  Keep the site covered until sutures have dissolved.  You can cover it with a Telfa (non-stick) dressing and tape or a band-aid.    If you are unable to keep wound covered, you must apply Vaseline every 2-3 hours (while awake) to ensure it is being kept moist for optimal healing. A dressing overnight is recommended to keep the area moist.  Call Us If:  You have pain that is not controlled with Tylenol/Ibuprofen  You have signs or symptoms of an infection, such as: fever over 100 degrees F, redness, warmth, or foul-smelling or yellow drainage from the wound.  Who should I call with questions?  [facility]: [phone]   [facility]: [phone]  For urgent needs outside of business hours call the [facility] at [phone] and ask to speak with the dermatology resident on call

## 2023-07-27 NOTE — LETTER
7/27/2023         RE: Elliot Norris  [address]  [facility] [address]        Dear Colleague,    Thank you for referring your patient, Elliot Norris, to the [facility]. Please see a copy of my visit note below.    [facility] [place] Procedure Note    Dermatology Problem List:  1. 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 Dysplastic nevi  - mild atypia, left abdomen, s/p shave bx 5/14/2018  - mild atypia, back midline at t4, s/p shave bx 5/14/2018  3. AKs s/p cryotherapy  4. Fibroepithelial polyp (acrochordon), right axilla, s/p shave bx 4/11/2023  _____________________________________________________________    Date of Service:  Jul 27, 2023  Surgery: Mohs micrographic surgery    Case 1  Repair Type: intermediate  Repair Size: 3.9 cm  Suture Material: Fast Absorbing Gut 5-0  Tumor Type: SCCIS - Squamous cell carcinoma in situ  Location: Left Frontal Scalp  Derm-Path Accession #: [id number]  PreOp Size: 1.0 x 0.9 cm  PostOp Size: 1.4 x 1.4 cm  Mohs Accession #: [id number]  Level of Defect: fascia      Procedure:  We discussed the principles of treatment and most likely complications including scarring, bleeding, infection, swelling, pain, crusting, nerve damage, large wound,  incomplete excision, wound dehiscence,  nerve damage, recurrence, and a second procedure may be recommended to obtain the best cosmetic or functional result.    Informed consent was obtained and the patient underwent the procedure as follows:  The patient was placed supine on the operating table.  The cancer was identified, outlined with a marker, and verified by the patient.  The entire surgical field was prepped with Hibiclens;Sterile saline.  The surgical site was anesthetized using Lidocaine 1% with epi 1:100,000.      The area of  clinically apparent tumor was not debulked. The layer of tissue was then surgically excised using a #15 blade and was then transferred onto a specimen sheet maintaining the orientation of the specimen. Hemostasis was obtained using bipolar electrocoagulation. The wound site was then covered with a dressing while the tissue samples were processed for examination.    The excised tissue was transported to the Mohs histology laboratory maintaining the tissue orientation.  The tissue specimen was relaxed so that the entire surgical margin was in a a single horizontal plane for sectioning and inked for precise mapping.  A precise reference map was drawn to reflect the sectioning of the specimen, colored inking of the margins, and orientation on the patient. The tissue was processed using horizontal sectioning of the base and continuous peripheral margins.  The histopathologic sections were reviewed in conjunction with the reference map.    Total blocks: 1    Total slides:  2    There were no cancer cells visualized on examination, therefore Mohs surgery was complete.    REPAIR: An intermediate layered linear closure was selected as the procedure which would maximally preserve both function and cosmesis.    After the excision of the tumor, the area was undermined. Hemostasis was obtained with electrocoagulation. Closure was oriented so that the wound was in the patient's natural skin tension lines. The subcutaneous and dermal layers were then closed with 4-0 Monocryl buried vertical mattress sutures. The epidermis was then carefully approximated along the length of the wound using 5-0 fast gut running subcuticular sutures.     The final wound length was 3.9 cm. Estimated blood loss was less than 10 ml for all surgical sites. A sterile pressure dressing was applied and wound care instructions, with a written handout, were given. The patient was discharged from the [facility]atologic Surgery Center alert and  ambulatory.    Follow-up: 3 months     Dr. Grace Starkey (Mohs micrographic surgery fellow) performed the micrographic surgery; and Ollie Limon DO performed the reconstruction/repair and was present for the entire micrographic surgery and always immediately available.    Staff Involved:    Scribe Disclosure:   I, Kristyn Pulidovivian, am serving as a scribe to document services personally performed by this physician, Dr. Ollie Limon, based on data collection and the provider's statements to me.      Grace Starkey MD  Micrographic Surgery and Dermatologic Oncology (MSDO) Fellow, PGY-5    Staff Physician Comments:   I saw and evaluated the patient with the Mohs Surgery Fellow (Dr. Grace Starkey) and I agree with the assessment and plan and the above description of the procedure as documented by the scribe. I was present for the entire procedure and entire exam.    Ollie Limon DO    Department of Dermatology  [facility]: Phone: [phone], Fax:[phone]  [facility]: Phone: [phone], Fax: [phone]    Care and Laboratory Testing Performed at:  [facility]   Dermatology Clinic  [address] [place] [address]          Again, thank you for allowing me to participate in the care of your patient.        Sincerely,        Ollie Limon MD

## 2023-07-27 NOTE — NURSING NOTE
The following medication was given:     MEDICATION:  Lidocaine with epinephrine 1% 1:150209  ROUTE: SQ  SITE: see procedure note  DOSE: 6 mL  LOT #: [id number]  : [employer]  EXPIRATION DATE: 01-  NDC#: 44087-122-01  Was there drug waste? No  Multi-dose vial: Yes    Polly Dias LPN  July 27, 2023      Vaseline and pressure dressing applied to Mohs site on Left frontal scalp.  Wound care instructions reviewed with patient and AVS provided.  Patient verbalized understanding.  Patient will follow up for suture removal: N/A.  No further questions or concerns at this time.

## 2023-07-27 NOTE — PROGRESS NOTES
[facility] Dermatologic Surgery [facility] [place] Procedure Note    Dermatology Problem List:  1. 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 Dysplastic nevi  - mild atypia, left abdomen, s/p shave bx 5/14/2018  - mild atypia, back midline at t4, s/p shave bx 5/14/2018  3. AKs s/p cryotherapy  4. Fibroepithelial polyp (acrochordon), right axilla, s/p shave bx 4/11/2023  _____________________________________________________________    Date of Service:  Jul 27, 2023  Surgery: Mohs micrographic surgery    Case 1  Repair Type: intermediate  Repair Size: 3.9 cm  Suture Material: Fast Absorbing Gut 5-0  Tumor Type: SCCIS - Squamous cell carcinoma in situ  Location: Left Frontal Scalp  Derm-Path Accession #: [id number]  PreOp Size: 1.0 x 0.9 cm  PostOp Size: 1.4 x 1.4 cm  Mohs Accession #: [id number]  Level of Defect: fascia      Procedure:  We discussed the principles of treatment and most likely complications including scarring, bleeding, infection, swelling, pain, crusting, nerve damage, large wound,  incomplete excision, wound dehiscence,  nerve damage, recurrence, and a second procedure may be recommended to obtain the best cosmetic or functional result.    Informed consent was obtained and the patient underwent the procedure as follows:  The patient was placed supine on the operating table.  The cancer was identified, outlined with a marker, and verified by the patient.  The entire surgical field was prepped with Hibiclens;Sterile saline.  The surgical site was anesthetized using Lidocaine 1% with epi 1:100,000.      The area of clinically apparent tumor was not debulked. The layer of tissue was then surgically excised using a #15 blade and was then transferred onto a specimen sheet maintaining the orientation of the specimen. Hemostasis was obtained using bipolar electrocoagulation. The  wound site was then covered with a dressing while the tissue samples were processed for examination.    The excised tissue was transported to the Mohs histology laboratory maintaining the tissue orientation.  The tissue specimen was relaxed so that the entire surgical margin was in a a single horizontal plane for sectioning and inked for precise mapping.  A precise reference map was drawn to reflect the sectioning of the specimen, colored inking of the margins, and orientation on the patient. The tissue was processed using horizontal sectioning of the base and continuous peripheral margins.  The histopathologic sections were reviewed in conjunction with the reference map.    Total blocks: 1    Total slides:  2    There were no cancer cells visualized on examination, therefore Mohs surgery was complete.    REPAIR: An intermediate layered linear closure was selected as the procedure which would maximally preserve both function and cosmesis.    After the excision of the tumor, the area was undermined. Hemostasis was obtained with electrocoagulation. Closure was oriented so that the wound was in the patient's natural skin tension lines. The subcutaneous and dermal layers were then closed with 4-0 Monocryl buried vertical mattress sutures. The epidermis was then carefully approximated along the length of the wound using 5-0 fast gut running subcuticular sutures.     The final wound length was 3.9 cm. Estimated blood loss was less than 10 ml for all surgical sites. A sterile pressure dressing was applied and wound care instructions, with a written handout, were given. The patient was discharged from the Dermatologic Surgery Center alert and ambulatory.    Follow-up: 3 months     Dr. Grace Starkey (Mohs micrographic surgery fellow) performed the micrographic surgery; and Ollie Limon DO performed the reconstruction/repair and was present for the entire micrographic surgery and always immediately available.    Staff  Involved:    Scribe Disclosure:   I, Kristyn Thorpe, am serving as a scribe to document services personally performed by this physician, Dr. Ollie Limon, based on data collection and the provider's statements to me.      Grace Starkey MD  Micrographic Surgery and Dermatologic Oncology (MSDO) Fellow, PGY-5    Staff Physician Comments:   I saw and evaluated the patient with the Mohs Surgery Fellow (Dr. Grace Starkey) and I agree with the assessment and plan and the above description of the procedure as documented by the scribe. I was present for the entire procedure and entire exam.    Ollie Limon DO    Department of Dermatology  [facility]: Phone: [phone], Fax:[phone]  [facility]: Phone: [phone], Fax: [phone]    Care and Laboratory Testing Performed at:  [facility]   Dermatology Clinic  [address] [place] [address]

## 2023-08-04 ENCOUNTER — MYC MEDICAL ADVICE (OUTPATIENT)
Dept: DERMATOLOGY | Facility: CLINIC | Age: 63
End: 2023-08-04
Payer: COMMERCIAL

## 2023-09-05 DIAGNOSIS — I10 PRIMARY HYPERTENSION: ICD-10-CM

## 2023-09-06 RX ORDER — LOSARTAN POTASSIUM 25 MG/1
25 TABLET ORAL DAILY
Qty: 30 TABLET | Refills: 1 | Status: SHIPPED | OUTPATIENT
Start: 2023-09-06 | End: 2023-11-03

## 2023-09-06 NOTE — TELEPHONE ENCOUNTER
"Requested Prescriptions   Pending Prescriptions Disp Refills    losartan (COZAAR) 25 MG tablet 30 tablet 1     Sig: Take 1 tablet (25 mg) by mouth daily       Angiotensin-II Receptors Passed - 9/5/2023  2:31 PM        Passed - Last blood pressure under 140/90 in past 12 months     BP Readings from Last 3 Encounters:   07/27/23 136/81   07/07/23 (!) 132/90   12/28/22 (!) 150/90                 Passed - Recent (12 mo) or future (30 days) visit within the authorizing provider's specialty     Patient has had an office visit with the authorizing provider or a provider within the authorizing providers department within the previous 12 mos or has a future within next 30 days. See \"Patient Info\" tab in inbasket, or \"Choose Columns\" in Meds & Orders section of the refill encounter.              Passed - Medication is active on med list        Passed - Patient is age 18 or older        Passed - Normal serum creatinine on file in past 12 months     Recent Labs   Lab Test 12/16/22  0740   CR 1.08       Ok to refill medication if creatinine is low          Passed - Normal serum potassium on file in past 12 months     Recent Labs   Lab Test 12/16/22  0740   POTASSIUM 4.0                      Prescription approved per [facility] Refill Protocol.     Pt was last seen on 12/28/22    Karis Dozier RN  [facility]   "

## 2023-10-10 ENCOUNTER — OFFICE VISIT (OUTPATIENT)
Dept: FAMILY MEDICINE | Facility: CLINIC | Age: 63
End: 2023-10-10
Payer: COMMERCIAL

## 2023-10-10 VITALS — BODY MASS INDEX: 32.95 KG/M2 | SYSTOLIC BLOOD PRESSURE: 132 MMHG | DIASTOLIC BLOOD PRESSURE: 74 MMHG | WEIGHT: 246.3 LBS

## 2023-10-10 DIAGNOSIS — L82.1 SEBORRHEIC KERATOSES: ICD-10-CM

## 2023-10-10 DIAGNOSIS — D18.01 CHERRY ANGIOMA: Primary | ICD-10-CM

## 2023-10-10 DIAGNOSIS — Z12.83 SKIN CANCER SCREENING: ICD-10-CM

## 2023-10-10 DIAGNOSIS — L81.4 SOLAR LENTIGINOSIS: ICD-10-CM

## 2023-10-10 DIAGNOSIS — D49.2 NEOPLASM OF UNSPECIFIED BEHAVIOR OF BONE, SOFT TISSUE, AND SKIN: ICD-10-CM

## 2023-10-10 DIAGNOSIS — D22.9 MULTIPLE PIGMENTED NEVI: ICD-10-CM

## 2023-10-10 DIAGNOSIS — L57.0 ACTINIC KERATOSIS: ICD-10-CM

## 2023-10-10 PROCEDURE — 88305 TISSUE EXAM BY PATHOLOGIST: CPT | Performed by: DERMATOLOGY

## 2023-10-10 PROCEDURE — 11102 TANGNTL BX SKIN SINGLE LES: CPT | Performed by: PHYSICIAN ASSISTANT

## 2023-10-10 PROCEDURE — 99214 OFFICE O/P EST MOD 30 MIN: CPT | Mod: 25 | Performed by: PHYSICIAN ASSISTANT

## 2023-10-10 RX ORDER — FLUOROURACIL 50 MG/G
CREAM TOPICAL 2 TIMES DAILY
Qty: 40 G | Refills: 1 | Status: SHIPPED | OUTPATIENT
Start: 2023-10-10 | End: 2023-10-31

## 2023-10-10 RX ORDER — CALCIPOTRIENE 50 UG/G
CREAM TOPICAL
Qty: 60 G | Refills: 3 | Status: SHIPPED | OUTPATIENT
Start: 2023-10-10 | End: 2023-10-31

## 2023-10-10 ASSESSMENT — PAIN SCALES - GENERAL: PAINLEVEL: NO PAIN (0)

## 2023-10-10 NOTE — PROGRESS NOTES
[facility] Dermatology Note  Encounter Date: Oct 10, 2023  Office Visit     Dermatology Problem List:  1. NMSC  - SCC is, left frontal scalp, s/p Mohs and linear repair 7/27/23  - BCC, forehead, s/p MMS 9/14/2017  - BCC, left forearm, s/p MMS 9/14/2017  - Hx BCC, nose  2. Dysplastic nevi  - mild atypia, left abdomen, s/p shave bx 5/14/2018  - mild atypia, back midline at t4, s/p shave bx 5/14/2018  3. AKs s/p cryotherapy - 4/11/2023  4. Fibroepithelial polyp (acrochordon), right axilla, s/p shave bx 4/11/2023    Last FBSC: 10/10/23  ____________________________________________    Assessment & Plan:     #NUB, Central upper chest  -evolving angioma vs BCC vs other.  -Shave biopsy today; see procedure    # AK, scalp and left temple, chronic, recurrent.     - Previously received cryotherapy.  Discussed starting Efudex 5% cream twice daily with equal parts calcipotriene 0.005% external cream twice daily with for a 6 week course.  Discussed possible side effects of irritation, burning, or pruritis.  Teratogenic.    # History of NMSC.  # History of atypical nevus.  - Continue photoprotection - recommend SPF 30 or higher with frequent reapplication  - Continue yearly skin exams  - Advised to monitor for changing, non-healing, bleeding, painful, changing, or otherwise symptomatic lesions     # Skin cancer screening with multiple benign nevi, solar lentigines    - ABCDEs: Counseled ABCDEs of melanoma: Asymmetry, Border (irregularity), Color (not uniform, changes in color), Diameter (greater than 6 mm which is about the size of a pencil eraser), and Evolving (any changes in preexisting moles).  - Sun protection: Counseled SPF30+ sunscreen, UPF clothing, sun avoidance, tanning bed avoidance.    # Cherry angiomas and seborrheic keratoses,  Benign, reassurance given.     Procedures Performed:   - Shave biopsy procedure note, location(s): Central upper chest. After discussion of benefits and risks including but  not limited to bleeding, infection, scar, incomplete removal, recurrence, and non-diagnostic biopsy, written consent and photographs were obtained. The area was cleaned with isopropyl alcohol. 0.5mL of 1% lidocaine with epinephrine was injected to obtain adequate anesthesia of lesion(s). Shave biopsy at site(s) performed. Hemostasis was achieved with aluminium chloride. Petrolatum ointment and a sterile dressing were applied. The patient tolerated the procedure and no complications were noted. The patient was provided with verbal and written post care instructions.     Follow-up: 6 month(s) in-person, or earlier for new or changing lesions      Staff and Scribe:     I, Rose Galeana, am serving as a scribe to document services personally performed by Delores Stout PA-C based on data collection and the provider's statements to me.    Provider Disclosure:   The documentation recorded by the scribe accurately reflects the services I personally performed and the decisions made by me.    All risks, benefits and alternatives were discussed with patient.  Patient is in agreement and understands the assessment and plan.  All questions were answered.  Sun Screen Education was given.   Return to Clinic in 6 months or sooner as needed.   Delores Stout PA-C   [facility]ermat[facility]    ____________________________________________    CC: Skin Check (FBSE)    HPI:  Mr. Elliot Norris is a(n) 63 year old male who presents today as a return patient for a FBSE.  He reports that he tolerated his Mohs surgery on 7/27/2023 well and that he will be following up with Dr. Limon in 3 weeks.  He denies any changes to his health.  He reports that he has healed well from his skin tag removal.      Patient is otherwise feeling well, without additional skin concerns.     Labs Reviewed:  N/A    Physical Exam:  Vitals: There were no vitals taken for this visit.  SKIN: Full skin, which includes the head/face,  both arms, chest, back, abdomen,both legs, genitalia and/or groin buttocks, digits and/or nails, was examined.  - Central upper chest 3 mm pink papule  - Pink gritty papules on the scalp and the left temple, approximately 14 in number  - No recurrence of SCC on the left frontal scalp s/p Mohs  - Multiple regular brown pigmented macules and papules are identified on the trunk and lower extremities.   - There are dome shaped bright red papules on the trunk and extremities.   - There are waxy stuck on tan to brown papules on the trunk and extremities  - No other lesions of concern on areas examined.             Medications:  Current Outpatient Medications   Medication    atorvastatin (LIPITOR) 20 MG tablet    calcipotriene (DOVONOX) 0.005 % external cream    diphenhydrAMINE (BENADRYL) 25 MG tablet    fluorouracil (EFUDEX) 5 % external cream    losartan (COZAAR) 25 MG tablet    Vitamin D, Cholecalciferol, 25 MCG (1000 UT) CAPS     No current facility-administered medications for this visit.      Past Medical History:   Patient Active Problem List   Diagnosis    ED (erectile dysfunction)    Hyperlipidemia LDL goal <160    Fatty liver    Insomnia    Obesity    Hypertension goal BP (blood pressure) < 140/90    Prediabetes    History of basal cell carcinoma    Scar    Depression, unspecified     Past Medical History:   Diagnosis Date    Arthritis 2016    A little bit.    Basal cell carcinoma     Cancer (H) 2009    Skin.    Depressive disorder     Occasionally, mild.    Diabetes (H)     Borderline    Hypertension 2016    About a year.       CC No referring provider defined for this encounter. on close of this encounter.

## 2023-10-10 NOTE — LETTER
10/10/2023         RE: Elliot Norris  [address]  [facility] [address]        Dear Colleague,    Thank you for referring your patient, Elliot Norris, to the [facility] [place]. Please see a copy of my visit note below.    [facility] Dermatology Note  Encounter Date: Oct 10, 2023  Office Visit     Dermatology Problem List:  1. NMSC  - SCC is, left frontal scalp, s/p Mohs and linear repair 7/27/23  - BCC, forehead, s/p MMS 9/14/2017  - BCC, left forearm, s/p MMS 9/14/2017  - Hx BCC, nose  2. Dysplastic nevi  - mild atypia, left abdomen, s/p shave bx 5/14/2018  - mild atypia, back midline at t4, s/p shave bx 5/14/2018  3. AKs s/p cryotherapy - 4/11/2023  4. Fibroepithelial polyp (acrochordon), right axilla, s/p shave bx 4/11/2023    Last FBSC: 10/10/23  ____________________________________________    Assessment & Plan:     #NUB, Central upper chest  -evolving angioma vs BCC vs other.  -Shave biopsy today; see procedure    # AK, scalp and left temple, chronic, recurrent.     - Previously received cryotherapy.  Discussed starting Efudex 5% cream twice daily with equal parts calcipotriene 0.005% external cream twice daily with for a 6 week course.  Discussed possible side effects of irritation, burning, or pruritis.  Teratogenic.    # History of NMSC.  # History of atypical nevus.  - Continue photoprotection - recommend SPF 30 or higher with frequent reapplication  - Continue yearly skin exams  - Advised to monitor for changing, non-healing, bleeding, painful, changing, or otherwise symptomatic lesions     # Skin cancer screening with multiple benign nevi, solar lentigines    - ABCDEs: Counseled ABCDEs of melanoma: Asymmetry, Border (irregularity), Color (not uniform, changes in color), Diameter (greater than 6 mm which is about the size of a pencil eraser), and Evolving (any changes in preexisting moles).  - Sun protection: Counseled SPF30+ sunscreen, UPF  clothing, sun avoidance, tanning bed avoidance.    # Cherry angiomas and seborrheic keratoses,  Benign, reassurance given.     Procedures Performed:   - Shave biopsy procedure note, location(s): Central upper chest. After discussion of benefits and risks including but not limited to bleeding, infection, scar, incomplete removal, recurrence, and non-diagnostic biopsy, written consent and photographs were obtained. The area was cleaned with isopropyl alcohol. 0.5mL of 1% lidocaine with epinephrine was injected to obtain adequate anesthesia of lesion(s). Shave biopsy at site(s) performed. Hemostasis was achieved with aluminium chloride. Petrolatum ointment and a sterile dressing were applied. The patient tolerated the procedure and no complications were noted. The patient was provided with verbal and written post care instructions.     Follow-up: 6 month(s) in-person, or earlier for new or changing lesions      Staff and Scribe:     I, Rose Galeana, am serving as a scribe to document services personally performed by Delores Stout PA-C based on data collection and the provider's statements to me.    Provider Disclosure:   The documentation recorded by the scribe accurately reflects the services I personally performed and the decisions made by me.    All risks, benefits and alternatives were discussed with patient.  Patient is in agreement and understands the assessment and plan.  All questions were answered.  Sun Screen Education was given.   Return to Clinic in 6 months or sooner as needed.   Delores Stout PA-C   [facility]ermat[facility]    ____________________________________________    CC: Skin Check (FBSE)    HPI:  Mr. Elliot Norris is a(n) 63 year old male who presents today as a return patient for a FBSE.  He reports that he tolerated his Mohs surgery on 7/27/2023 well and that he will be following up with Dr. Limon in 3 weeks.  He denies any changes to his health.  He reports  that he has healed well from his skin tag removal.      Patient is otherwise feeling well, without additional skin concerns.     Labs Reviewed:  N/A    Physical Exam:  Vitals: There were no vitals taken for this visit.  SKIN: Full skin, which includes the head/face, both arms, chest, back, abdomen,both legs, genitalia and/or groin buttocks, digits and/or nails, was examined.  - Central upper chest 3 mm pink papule  - Pink gritty papules on the scalp and the left temple, approximately 14 in number  - No recurrence of SCC on the left frontal scalp s/p Mohs  - Multiple regular brown pigmented macules and papules are identified on the trunk and lower extremities.   - There are dome shaped bright red papules on the trunk and extremities.   - There are waxy stuck on tan to brown papules on the trunk and extremities  - No other lesions of concern on areas examined.             Medications:  Current Outpatient Medications   Medication     atorvastatin (LIPITOR) 20 MG tablet     calcipotriene (DOVONOX) 0.005 % external cream     diphenhydrAMINE (BENADRYL) 25 MG tablet     fluorouracil (EFUDEX) 5 % external cream     losartan (COZAAR) 25 MG tablet     Vitamin D, Cholecalciferol, 25 MCG (1000 UT) CAPS     No current facility-administered medications for this visit.      Past Medical History:   Patient Active Problem List   Diagnosis     ED (erectile dysfunction)     Hyperlipidemia LDL goal <160     Fatty liver     Insomnia     Obesity     Hypertension goal BP (blood pressure) < 140/90     Prediabetes     History of basal cell carcinoma     Scar     Depression, unspecified     Past Medical History:   Diagnosis Date     Arthritis 2016    A little bit.     Basal cell carcinoma      Cancer (H) 2009    Skin.     Depressive disorder     Occasionally, mild.     Diabetes (H)     Borderline     Hypertension 2016    About a year.       CC No referring provider defined for this encounter. on close of this encounter.      Again, thank you  for allowing me to participate in the care of your patient.        Sincerely,        Delores Stout PA-C

## 2023-10-10 NOTE — PATIENT INSTRUCTIONS
Start field therapy with topical Chemotherapy.   Start calcipotriene 0.005% ointment bid  mixed with equal parts Efudex 5% cream twice daily for five-7  days. Discussed possible side effects of irritation, burning or pruritus.       Wound Care After a Biopsy    What is a skin biopsy?  A skin biopsy allows the doctor to examine a very small piece of tissue under the microscope to determine the diagnosis and the best treatment for the skin condition. A local anesthetic (numbing medicine) is injected with a very small needle into the skin area to be tested. A small piece of skin is taken from the area. Sometimes a suture (stitch) is used.     What are the risks of a skin biopsy?  I will experience scar, bleeding, swelling, pain, crusting and redness. I may experience incomplete removal or recurrence. Risks of this procedure are excessive bleeding, bruising, infection, nerve damage, numbness, thick (hypertrophic or keloidal) scar and non-diagnostic biopsy.    How should I care for my wound for the first 24 hours?  Keep the wound dry and covered for 24 hours  If it bleeds, hold direct pressure on the area for 15 minutes. If bleeding does not stop, call us or go to the emergency room  Avoid strenuous exercise the first 1-2 days or as your doctor instructs you    How should I care for the wound after 24 hours?  After 24 hours, remove the bandage  You may bathe or shower as normal  If you had a scalp biopsy, you can shampoo as usual and can use shower water to clean the biopsy site daily  Clean the wound once a day with gentle soap and water  Do not scrub, be gentle  Apply white petroleum/Vaseline after cleaning the wound with a cotton swab or a clean finger, and keep the site covered with a Bandaid /bandage. Bandages are not necessary with a scalp biopsy  If you are unable to cover the site with a Bandaid /bandage, re-apply ointment 2-3 times a day to keep the site moist. Moisture will help with healing  Avoid strenuous  activity for first 1-2 days  Avoid lakes, rivers, pools, and oceans until the stitches are removed or the site is healed    How do I clean my wound?  Wash hands thoroughly with soap or use hand  before all wound care  Clean the wound with gentle soap and water  Apply white petroleum/Vaseline  to wound after it is clean  Replace the Bandaid /bandage to keep the wound covered for the first few days or as instructed by your doctor  If you had a scalp biopsy, warm shower water to the area on a daily basis should suffice    What should I use to clean my wound?   Cotton-tipped applicators (Qtips )  White petroleum jelly (Vaseline ). Use a clean new container and use Q-tips to apply.  Bandaids  as needed  Gentle soap     How should I care for my wound long term?  Do not get your wound dirty  Keep up with wound care for one week or until the area is healed.    A small scab will form and fall off by itself when the area is completely healed. The area will be red and will become pink in color as it heals. Sun protection is very important for how your scar will turn out. Sunscreen with an SPF 30 or greater is recommended once the area is healed.  You should have some soreness but it should be mild and slowly go away over several days. Talk to your doctor about using tylenol for pain,    When should I call my doctor?  If you have increased:   Pain or swelling  Pus or drainage (clear or slightly yellow drainage is ok)  Temperature over 100F  Spreading redness or warmth around wound    When will I hear about my results?  The biopsy results can take 2 weeks to come back.  Your results will automatically release to [employer] before your provider has even reviewed them.  The clinic will call you with the results, send you a [employer] message, or have you schedule a follow-up clinic or phone time to discuss the results.  Contact our clinics if you do not hear from us in 2 weeks.    Who should I call with questions?  [place]  [facility]: [phone]  [facility]: [phone]  For urgent needs outside of business hours call the [facility] at [phone] and ask for the dermatology resident on call       Patient Education       Proper skin care from [facility]:    -Eliminate harsh soaps as they strip the natural oils from the skin, often resulting in dry itchy skin ( i.e. Dial, Zest, [place])  -Use mild soaps such as Cetaphil or Dove Sensitive Skin in the shower. You do not need to use soap on arms, legs, and trunk every time you shower unless visibly soiled.   -Avoid hot or cold showers.  -After showering, lightly dry off and apply moisturizing within 2-3 minutes. This will help trap moisture in the skin.   -Aggressive use of a moisturizer at least 1-2 times a day to the entire body (including -Vanicream, Cetaphil, Aquaphor or Cerave) and moisturize hands after every washing.  -We recommend using moisturizers that come in a tub that needs to be scooped out, not a pump. This has more of an oil base. It will hold moisture in your skin much better than a water base moisturizer. The above recommended are non-pore clogging.      Wear a sunscreen with at least SPF 30 on your face, ears, neck and V of the chest daily. Wear sunscreen on other areas of the body if those areas are exposed to the sun throughout the day. Sunscreens can contain physical and/or chemical blockers. Physical blockers are less likely to clog pores, these include zinc oxide and titanium dioxide. Reapply every two hour and after swimming.     Sunscreen examples: https://www.ewg.org/sunscreen/    UV radiation  UVA radiation remains constant throughout the day and throughout the year. It is a longer wavelength than UVB and therefore penetrates deeper into the skin leading to immediate and delayed tanning, photoaging, and skin cancer. 70-80% of UVA and UVB radiation occurs between the hours of  10am-2pm.  UVB radiation  UVB radiation causes the most harmful effects and is more significant during the summer months. However, snow and ice can reflect UVB radiation leading to skin damage during the winter months as well. UVB radiation is responsible for tanning, burning, inflammation, delayed erythema (pinkness), pigmentation (brown spots), and skin cancer.     I recommend self monthly full body exams and yearly full body exams with a dermatology provider. If you develop a new or changing lesion please follow up for examination. Most skin cancers are pink and scaly or pink and pearly. However, we do see blue/brown/black skin cancers.  Consider the ABCDEs of melanoma when giving yourself your monthly full body exam ( don't forget the groin, buttocks, feet, toes, etc). A-asymmetry, B-borders, C-color, D-diameter, E-elevation or evolving. If you see any of these changes please follow up in clinic. If you cannot see your back I recommend purchasing a hand held mirror to use with a larger wall mirror.       Checking for Skin Cancer  You can find cancer early by checking your skin each month. There are 3 kinds of skin cancer. They are melanoma, basal cell carcinoma, and squamous cell carcinoma. Doing monthly skin checks is the best way to find new marks or skin changes. Follow the instructions below for checking your skin.   The ABCDEs of checking moles for melanoma   Check your moles or growths for signs of melanoma using ABCDE:   Asymmetry: the sides of the mole or growth don t match  Border: the edges are ragged, notched, or blurred  Color: the color within the mole or growth varies  Diameter: the mole or growth is larger than 6 mm (size of a pencil eraser)  Evolving: the size, shape, or color of the mole or growth is changing (evolving is not shown in the images below)    Checking for other types of skin cancer  Basal cell carcinoma or squamous cell carcinoma have symptoms such as:     A spot or mole that looks  different from all other marks on your skin  Changes in how an area feels, such as itching, tenderness, or pain  Changes in the skin's surface, such as oozing, bleeding, or scaliness  A sore that does not heal  New swelling or redness beyond the border of a mole    Who s at risk?  Anyone can get skin cancer. But you are at greater risk if you have:   Fair skin, light-colored hair, or light-colored eyes  Many moles or abnormal moles on your skin  A history of sunburns from sunlight or tanning beds  A family history of skin cancer  A history of exposure to radiation or chemicals  A weakened immune system  If you have had skin cancer in the past, you are at risk for recurring skin cancer.   How to check your skin  Do your monthly skin checkups in front of a full-length mirror. Check all parts of your body, including your:   Head (ears, face, neck, and scalp)  Torso (front, back, and sides)  Arms (tops, undersides, upper, and lower armpits)  Hands (palms, backs, and fingers, including under the nails)  Buttocks and genitals  Legs (front, back, and sides)  Feet (tops, soles, toes, including under the nails, and between toes)  If you have a lot of moles, take digital photos of them each month. Make sure to take photos both up close and from a distance. These can help you see if any moles change over time.   Most skin changes are not cancer. But if you see any changes in your skin, call your doctor right away. Only he or she can diagnose a problem. If you have skin cancer, seeing your doctor can be the first step toward getting the treatment that could save your life.   [employer] last reviewed this educational content on 4/1/2019 2000-2020 [employer]. [address] [place] [address], [place] [address]. All rights reserved. This information is not intended as a substitute for professional medical care. Always follow your healthcare professional's instructions.       When should I call my doctor?  If you are  worsening or not improving, please, contact us or seek urgent care as noted below.     Who should I call with questions (adults)?  [facility] (adult and pediatric): [phone]  [facility] (adult): [phone]  [facility] ([facility] [place] [facility] [place]) [phone]  For urgent needs outside of business hours call the [facility] at [phone] and ask for the dermatology resident on call to be paged  If this is a medical emergency and you are unable to reach an ER, Call 911      If you need a prescription refill, please contact your pharmacy. Refills are approved or denied by our Physicians during normal business hours, Monday through Fridays  Per office policy, refills will not be granted if you have not been seen within the past year (or sooner depending on your child's condition)

## 2023-10-11 LAB
PATH REPORT.COMMENTS IMP SPEC: NORMAL
PATH REPORT.COMMENTS IMP SPEC: NORMAL
PATH REPORT.FINAL DX SPEC: NORMAL
PATH REPORT.GROSS SPEC: NORMAL
PATH REPORT.MICROSCOPIC SPEC OTHER STN: NORMAL
PATH REPORT.RELEVANT HX SPEC: NORMAL

## 2023-10-23 ENCOUNTER — MYC MEDICAL ADVICE (OUTPATIENT)
Dept: FAMILY MEDICINE | Facility: CLINIC | Age: 63
End: 2023-10-23
Payer: COMMERCIAL

## 2023-10-23 NOTE — TELEPHONE ENCOUNTER
LS,  Please see below [employer] message and advise.  Do you want to see patient vs send to podiatry?  Thanks,  Mis LORENZO RN

## 2023-10-30 ENCOUNTER — OFFICE VISIT (OUTPATIENT)
Dept: DERMATOLOGY | Facility: CLINIC | Age: 63
End: 2023-10-30
Payer: COMMERCIAL

## 2023-10-30 DIAGNOSIS — Z86.007 HISTORY OF SQUAMOUS CELL CARCINOMA IN SITU OF SKIN: ICD-10-CM

## 2023-10-30 DIAGNOSIS — L90.5 FACIAL SCAR: Primary | ICD-10-CM

## 2023-10-30 PROCEDURE — 99214 OFFICE O/P EST MOD 30 MIN: CPT | Performed by: DERMATOLOGY

## 2023-10-30 PROCEDURE — 99024 POSTOP FOLLOW-UP VISIT: CPT | Performed by: DERMATOLOGY

## 2023-10-30 NOTE — PROGRESS NOTES
[facility] Dermatology Note  Encounter Date: Oct 30, 2023  Office Visit     Dermatology Problem List:  1. 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 Dysplastic nevi  - mild atypia, left abdomen, s/p shave bx 5/14/2018  - mild atypia, back midline at t4, s/p shave bx 5/14/2018  3. AKs s/p cryotherapy - 4/11/2023  - Efudex treatment   4. 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   - Discussed that he could undergo a steroid injection in order to flatten out his scar which he is interested in having today.   - ILK injections performed today, see procedure notes below.  Discussed that he may return in about 4-6 weeks for repeat injections if desired.     #AKs  - Previously underwent cryotherapy and recent Efudex treatment.   - Discussed that he may undergo about 2 more cycles of his Efudex treatment, but he should wait about 1 month to heal before starting his next cycle of treatment.    - Will be following up with Eliza Stout for further treatment on 4/2/2024.    # History of NMSC.  # History of atypical nevus.  - Continue photoprotection - recommend SPF 30 or higher with frequent reapplication  - Continue yearly skin exams  - Advised to monitor for changing, non-healing, bleeding, painful, changing, or otherwise symptomatic lesions     Procedures Performed:   - Intra-lesional triamcinolone procedure note. After verbal consent and review of risk of pain and skin thinning/atrophy, positioning and cleansing with isopropyl alcohol, 0.2 total mL of triamcinolone 10 mg/mL was injected into 1 lesion on the left frontal scalp. The patient tolerated the procedure well and  left the dermatology clinic in good condition.    Follow-up: Derm surgery PRN, Gen Derm q6 months.     Staff and Scribe:     Scribe Disclosure:  I, TIFFANY IVAN, am serving as a scribe to document services personally performed by Ollie Limon MD based on data collection and the provider's statements to me.     Provider Disclosure:   The documentation recorded by the scribe accurately reflects the services I personally performed and the decisions made by me. I personally performed the procedures today.  No charge for injection procedure as it was done to revise a scar created here.     Ollie Limon DO    Department of Dermatology  [facility]: Phone: [phone], Fax:[phone]  [facility]: Phone: [phone], Fax: [phone]    ____________________________________________    CC: RECHECK (Patient is here for a 3 mo scar eval)    HPI:  Mr. Elliot Norris is a(n) 63 year old male who presents today as a return patient for follow up on scar evaluation s/p left frontal scalp, s/p Mohs and linear repair 7/27/23.  He reports that he is doing well and that he is not having any pain to the scalp.  He just recently finished a course of Efudex cream twice daily for a 7 day period.    Patient is otherwise feeling well, without additional skin concerns.    Labs Reviewed:  N/A    Physical Exam:  Vitals: There were no vitals taken for this visit.  SKIN: Focused examination of scalp and face was performed.  - Well healed surgical scar on the left frontal scalp.  Minimal dog ear at the ends.   - No other lesions of concern on areas examined.     Medications:  Current Outpatient Medications   Medication    atorvastatin (LIPITOR) 20 MG tablet    calcipotriene (DOVONOX) 0.005 % external cream    diphenhydrAMINE (BENADRYL) 25 MG tablet    fluorouracil (EFUDEX) 5 % external cream    losartan (COZAAR)  25 MG tablet    Vitamin D, Cholecalciferol, 25 MCG (1000 UT) CAPS     No current facility-administered medications for this visit.      Past Medical History:   Patient Active Problem List   Diagnosis    ED (erectile dysfunction)    Hyperlipidemia LDL goal <160    Fatty liver    Insomnia    Obesity    Hypertension goal BP (blood pressure) < 140/90    Prediabetes    History of basal cell carcinoma    Scar    Depression, unspecified     Past Medical History:   Diagnosis Date    Arthritis 2016    A little bit.    Basal cell carcinoma     Cancer (H) 2009    Skin.    Depressive disorder     Occasionally, mild.    Diabetes (H)     Borderline    Hypertension 2016    About a year.

## 2023-10-30 NOTE — NURSING NOTE
Elliot Norris's goals for this visit include:   Chief Complaint   Patient presents with    RECHECK     Patient is here for a 3 mo scar eval       He requests these members of his care team be copied on today's visit information:     PCP: Mary Foley    Referring Provider:  No referring provider defined for this encounter.    There were no vitals taken for this visit.    Do you need any medication refills at today's visit?       Pari Flores EMT

## 2023-10-30 NOTE — LETTER
10/30/2023         RE: Elliot Norris  [address]  [facility] [address]        Dear Colleague,    Thank you for referring your patient, Elliot Norris, to the [facility]. Please see a copy of my visit note below.    [facility] Dermatology Note  Encounter Date: Oct 30, 2023  Office Visit     Dermatology Problem List:  1. 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 Dysplastic nevi  - mild atypia, left abdomen, s/p shave bx 5/14/2018  - mild atypia, back midline at t4, s/p shave bx 5/14/2018  3. AKs s/p cryotherapy - 4/11/2023  - Efudex treatment   4. 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   - Discussed that he could undergo a steroid injection in order to flatten out his scar which he is interested in having today.   - ILK injections performed today, see procedure notes below.  Discussed that he may return in about 4-6 weeks for repeat injections if desired.     #AKs  - Previously underwent cryotherapy and recent Efudex treatment.   - Discussed that he may undergo about 2 more cycles of his Efudex treatment, but he should wait about 1 month to heal before starting his next cycle of treatment.    - Will be following up with Eliza Stout for further treatment on 4/2/2024.    # History of NMSC.  # History of atypical nevus.  - Continue photoprotection - recommend SPF 30 or higher with frequent reapplication  - Continue yearly skin exams  - Advised to monitor for changing, non-healing, bleeding, painful, changing, or otherwise symptomatic lesions     Procedures Performed:   - Intra-lesional triamcinolone procedure  note. After verbal consent and review of risk of pain and skin thinning/atrophy, positioning and cleansing with isopropyl alcohol, 0.2 total mL of triamcinolone 10 mg/mL was injected into 1 lesion on the left frontal scalp. The patient tolerated the procedure well and left the dermatology clinic in good condition.    Follow-up: Derm surgery PRN, Gen Derm q6 months.     Staff and Scribe:     Scribe Disclosure:  I, TIFFANYSIVA LOVE, am serving as a scribe to document services personally performed by Ollie Limon MD based on data collection and the provider's statements to me.     Provider Disclosure:   The documentation recorded by the scribe accurately reflects the services I personally performed and the decisions made by me. I personally performed the procedures today.  No charge for injection procedure as it was done to revise a scar created here.     Ollie Limon DO    Department of Dermatology  [facility]: Phone: [phone], Fax:[phone]  [facility]: Phone: [phone], Fax: [phone]    ____________________________________________    CC: RECHECK (Patient is here for a 3 mo scar eval)    HPI:  Mr. Elliot Norris is a(n) 63 year old male who presents today as a return patient for follow up on scar evaluation s/p left frontal scalp, s/p Mohs and linear repair 7/27/23.  He reports that he is doing well and that he is not having any pain to the scalp.  He just recently finished a course of Efudex cream twice daily for a 7 day period.    Patient is otherwise feeling well, without additional skin concerns.    Labs Reviewed:  N/A    Physical Exam:  Vitals: There were no vitals taken for this visit.  SKIN: Focused examination of scalp and face was performed.  - Well healed surgical scar on the left frontal scalp.  Minimal dog ear at the ends.   - No other lesions of concern on areas  examined.     Medications:  Current Outpatient Medications   Medication     atorvastatin (LIPITOR) 20 MG tablet     calcipotriene (DOVONOX) 0.005 % external cream     diphenhydrAMINE (BENADRYL) 25 MG tablet     fluorouracil (EFUDEX) 5 % external cream     losartan (COZAAR) 25 MG tablet     Vitamin D, Cholecalciferol, 25 MCG (1000 UT) CAPS     No current facility-administered medications for this visit.      Past Medical History:   Patient Active Problem List   Diagnosis     ED (erectile dysfunction)     Hyperlipidemia LDL goal <160     Fatty liver     Insomnia     Obesity     Hypertension goal BP (blood pressure) < 140/90     Prediabetes     History of basal cell carcinoma     Scar     Depression, unspecified     Past Medical History:   Diagnosis Date     Arthritis 2016    A little bit.     Basal cell carcinoma      Cancer (H) 2009    Skin.     Depressive disorder     Occasionally, mild.     Diabetes (H)     Borderline     Hypertension 2016    About a year.            Again, thank you for allowing me to participate in the care of your patient.        Sincerely,        Ollie Limon MD

## 2023-10-31 ENCOUNTER — MYC MEDICAL ADVICE (OUTPATIENT)
Dept: FAMILY MEDICINE | Facility: CLINIC | Age: 63
End: 2023-10-31

## 2023-10-31 ENCOUNTER — OFFICE VISIT (OUTPATIENT)
Dept: FAMILY MEDICINE | Facility: CLINIC | Age: 63
End: 2023-10-31
Payer: COMMERCIAL

## 2023-10-31 ENCOUNTER — ANCILLARY PROCEDURE (OUTPATIENT)
Dept: GENERAL RADIOLOGY | Facility: CLINIC | Age: 63
End: 2023-10-31
Attending: FAMILY MEDICINE
Payer: COMMERCIAL

## 2023-10-31 VITALS
OXYGEN SATURATION: 95 % | TEMPERATURE: 97.8 F | HEART RATE: 71 BPM | HEIGHT: 72 IN | DIASTOLIC BLOOD PRESSURE: 80 MMHG | WEIGHT: 244.8 LBS | RESPIRATION RATE: 16 BRPM | BODY MASS INDEX: 33.16 KG/M2 | SYSTOLIC BLOOD PRESSURE: 130 MMHG

## 2023-10-31 DIAGNOSIS — M79.672 PAIN IN BOTH FEET: Primary | ICD-10-CM

## 2023-10-31 DIAGNOSIS — M79.671 PAIN IN BOTH FEET: Primary | ICD-10-CM

## 2023-10-31 DIAGNOSIS — M79.672 PAIN IN BOTH FEET: ICD-10-CM

## 2023-10-31 DIAGNOSIS — M79.671 PAIN IN BOTH FEET: ICD-10-CM

## 2023-10-31 PROCEDURE — 73630 X-RAY EXAM OF FOOT: CPT | Mod: TC | Performed by: RADIOLOGY

## 2023-10-31 PROCEDURE — 99214 OFFICE O/P EST MOD 30 MIN: CPT | Performed by: FAMILY MEDICINE

## 2023-10-31 ASSESSMENT — ANXIETY QUESTIONNAIRES
GAD7 TOTAL SCORE: 1
GAD7 TOTAL SCORE: 1
2. NOT BEING ABLE TO STOP OR CONTROL WORRYING: NOT AT ALL
7. FEELING AFRAID AS IF SOMETHING AWFUL MIGHT HAPPEN: NOT AT ALL
6. BECOMING EASILY ANNOYED OR IRRITABLE: SEVERAL DAYS
4. TROUBLE RELAXING: NOT AT ALL
1. FEELING NERVOUS, ANXIOUS, OR ON EDGE: NOT AT ALL
7. FEELING AFRAID AS IF SOMETHING AWFUL MIGHT HAPPEN: NOT AT ALL
8. IF YOU CHECKED OFF ANY PROBLEMS, HOW DIFFICULT HAVE THESE MADE IT FOR YOU TO DO YOUR WORK, TAKE CARE OF THINGS AT HOME, OR GET ALONG WITH OTHER PEOPLE?: SOMEWHAT DIFFICULT
IF YOU CHECKED OFF ANY PROBLEMS ON THIS QUESTIONNAIRE, HOW DIFFICULT HAVE THESE PROBLEMS MADE IT FOR YOU TO DO YOUR WORK, TAKE CARE OF THINGS AT HOME, OR GET ALONG WITH OTHER PEOPLE: SOMEWHAT DIFFICULT
5. BEING SO RESTLESS THAT IT IS HARD TO SIT STILL: NOT AT ALL
GAD7 TOTAL SCORE: 1
3. WORRYING TOO MUCH ABOUT DIFFERENT THINGS: NOT AT ALL

## 2023-10-31 ASSESSMENT — PAIN SCALES - GENERAL: PAINLEVEL: NO PAIN (0)

## 2023-10-31 ASSESSMENT — PATIENT HEALTH QUESTIONNAIRE - PHQ9
SUM OF ALL RESPONSES TO PHQ QUESTIONS 1-9: 0
SUM OF ALL RESPONSES TO PHQ QUESTIONS 1-9: 0
10. IF YOU CHECKED OFF ANY PROBLEMS, HOW DIFFICULT HAVE THESE PROBLEMS MADE IT FOR YOU TO DO YOUR WORK, TAKE CARE OF THINGS AT HOME, OR GET ALONG WITH OTHER PEOPLE: NOT DIFFICULT AT ALL

## 2023-10-31 NOTE — PROGRESS NOTES
"  Assessment & Plan     Pain in both feet  He does have a small calcaneal spur on the X ray , we discussed rest , show inserts, ice intermittently , NSAIDS but I recommend that he sees a podiatrist and I gave him the referral   - XR Foot Bilateral G/E 3 Views; Future  - Orthopedic  Referral; Future    RTC if no improving or worsening.       BMI:   Estimated body mass index is 32.93 kg/m  as calculated from the following:    Height as of this encounter: 1.836 m (6' 0.3\").    Weight as of this encounter: 111 kg (244 lb 12.8 oz).         Mary Foley MD  [facility]    Elio Sue is a 63 year old, presenting for the following health issues:  Foot Problems      10/31/2023    10:06 AM   Additional Questions   Roomed by Bert PALMA   He is in an Aerobics class and weight watchers , trying to lose weight , feet hurt on the bottom, right worse than the left   Was told he had plantar fasciitis many yrs ago   Now with new shoes has increasing pain on the bottom   History of Present Illness       Reason for visit:  I was seen for plantar fasciitis years ago.  It is back!    He eats 4 or more servings of fruits and vegetables daily.He consumes 0 sweetened beverage(s) daily.He exercises with enough effort to increase his heart rate 30 to 60 minutes per day.  He exercises with enough effort to increase his heart rate 3 or less days per week.   He is taking medications regularly.           Review of Systems   Constitutional, HEENT, cardiovascular, pulmonary, GI, , musculoskeletal, neuro, skin, endocrine and psych systems are negative, except as otherwise noted.      Objective    There were no vitals taken for this visit.  There is no height or weight on file to calculate BMI.  Physical Exam   GENERAL: healthy, alert and no distress  EYES: Eyes grossly normal to inspection, PERRL and conjunctivae and sclerae normal  RESP: lungs clear to auscultation - no rales, rhonchi or wheezes  CV: regular rate " and rhythm, normal S1 S2, no S3 or S4, no murmur, click or rub, no peripheral edema and peripheral pulses strong  MS: no gross musculoskeletal defects noted, no edema  NEURO: Normal strength and tone, mentation intact and speech normal  PSYCH: mentation appears normal, affect normal/bright    No results found for this or any previous visit (from the past 24 hour(s)).

## 2023-11-02 DIAGNOSIS — L57.0 ACTINIC KERATOSIS: ICD-10-CM

## 2023-11-02 RX ORDER — FLUOROURACIL 50 MG/G
CREAM TOPICAL 2 TIMES DAILY
Qty: 40 G | Refills: 1 | Status: SHIPPED | OUTPATIENT
Start: 2023-11-02

## 2023-11-03 DIAGNOSIS — I10 PRIMARY HYPERTENSION: ICD-10-CM

## 2023-11-03 RX ORDER — LOSARTAN POTASSIUM 25 MG/1
25 TABLET ORAL DAILY
Qty: 30 TABLET | Refills: 1 | Status: SHIPPED | OUTPATIENT
Start: 2023-11-03 | End: 2024-01-03

## 2023-11-08 ENCOUNTER — OFFICE VISIT (OUTPATIENT)
Dept: PODIATRY | Facility: CLINIC | Age: 63
End: 2023-11-08
Attending: FAMILY MEDICINE
Payer: COMMERCIAL

## 2023-11-08 VITALS — TEMPERATURE: 97.7 F | OXYGEN SATURATION: 97 % | RESPIRATION RATE: 16 BRPM | HEART RATE: 97 BPM

## 2023-11-08 DIAGNOSIS — M77.31 CALCANEAL SPUR OF BOTH FEET: ICD-10-CM

## 2023-11-08 DIAGNOSIS — M77.32 CALCANEAL SPUR OF BOTH FEET: ICD-10-CM

## 2023-11-08 DIAGNOSIS — M72.2 PLANTAR FASCIITIS: Primary | ICD-10-CM

## 2023-11-08 PROCEDURE — 20550 NJX 1 TENDON SHEATH/LIGAMENT: CPT | Mod: 50 | Performed by: PODIATRIST

## 2023-11-08 PROCEDURE — 99243 OFF/OP CNSLTJ NEW/EST LOW 30: CPT | Mod: 25 | Performed by: PODIATRIST

## 2023-11-08 RX ORDER — DEXAMETHASONE SODIUM PHOSPHATE 4 MG/ML
4 INJECTION, SOLUTION INTRA-ARTICULAR; INTRALESIONAL; INTRAMUSCULAR; INTRAVENOUS; SOFT TISSUE ONCE
Status: COMPLETED | OUTPATIENT
Start: 2023-11-08 | End: 2023-11-08

## 2023-11-08 RX ORDER — COVID-19 ANTIGEN TEST
KIT MISCELLANEOUS SEE ADMIN INSTRUCTIONS
COMMUNITY
Start: 2023-03-13 | End: 2024-01-10

## 2023-11-08 RX ORDER — LIDOCAINE HYDROCHLORIDE 20 MG/ML
1 INJECTION, SOLUTION INFILTRATION; PERINEURAL ONCE
Status: COMPLETED | OUTPATIENT
Start: 2023-11-08 | End: 2023-11-08

## 2023-11-08 RX ADMIN — LIDOCAINE HYDROCHLORIDE 1 ML: 20 INJECTION, SOLUTION INFILTRATION; PERINEURAL at 13:54

## 2023-11-08 RX ADMIN — DEXAMETHASONE SODIUM PHOSPHATE 4 MG: 4 INJECTION, SOLUTION INTRA-ARTICULAR; INTRALESIONAL; INTRAMUSCULAR; INTRAVENOUS; SOFT TISSUE at 13:53

## 2023-11-08 RX ADMIN — DEXAMETHASONE SODIUM PHOSPHATE 4 MG: 4 INJECTION, SOLUTION INTRA-ARTICULAR; INTRALESIONAL; INTRAMUSCULAR; INTRAVENOUS; SOFT TISSUE at 13:54

## 2023-11-08 RX ADMIN — LIDOCAINE HYDROCHLORIDE 1 ML: 20 INJECTION, SOLUTION INFILTRATION; PERINEURAL at 13:55

## 2023-11-08 ASSESSMENT — PAIN SCALES - GENERAL: PAINLEVEL: MILD PAIN (2)

## 2023-11-08 NOTE — Clinical Note
"    2023         RE: Elliot Norris  [address]  [facility] [address]        Dear Colleague,    Thank you for referring your patient, Elliot Norris, to the [facility]. Please see a copy of my visit note below.    FOOT AND ANKLE SURGERY/PODIATRY CONSULT NOTE         ASSESSMENT:   ***      TREATMENT:  ***        HPI: I was asked to see Elliot SAEED Jr today  ***.  The patient was seen in consultation at the request of Mary Foley MD for ***.     {PAST MEDICAL HISTORY:[id number]::\"***\"}    {SOCIAL HISTORY:571012209::\"***\"}       Allergies   Allergen Reactions     Seasonal Allergies           Current Outpatient Medications:      FLOWFLEX COVID-19 AG HOME TEST KIT, See Admin Instructions, Disp: , Rfl:      atorvastatin (LIPITOR) 20 MG tablet, TAKE 1 TABLET(20 MG) BY MOUTH DAILY, Disp: 90 tablet, Rfl: 3     diphenhydrAMINE (BENADRYL) 25 MG tablet, Take 25 mg by mouth every 6 hours as needed for itching or allergies, Disp: , Rfl:      fluorouracil (EFUDEX) 5 % external cream, Apply topically 2 times daily Mixed with equal parts calcipotriene cream to the scalp, forehead and temples. Will cause redness., Disp: 40 g, Rfl: 1     losartan (COZAAR) 25 MG tablet, Take 1 tablet (25 mg) by mouth daily, Disp: 30 tablet, Rfl: 1     Vitamin D, Cholecalciferol, 25 MCG (1000 UT) CAPS, Take by mouth daily, Disp: , Rfl:      Family History   Problem Relation Age of Onset     Diabetes Mother         Pancreatic cancer as well!     Arthritis Mother      Gynecology Mother      Pancreatic Cancer Mother      Obesity Mother      Diabetes Father      Hypertension Father      Alcohol/Drug Father      Circulatory Father      Depression Father      Heart Disease Father      Lipids Father      Cerebrovascular Disease Father          age 78     Anxiety Disorder Father      Substance Abuse Father         Alcohol     Obesity Father      Hyperlipidemia Father      Diabetes Maternal " Grandmother      Cerebrovascular Disease Maternal Grandfather      Prostate Cancer Maternal Grandfather      Prostate Cancer Paternal Grandfather      Depression Paternal Grandmother      Depression Sister      Depression Sister      Obesity Sister         Social History     Socioeconomic History     Marital status: Single     Spouse name: Not on file     Number of children: Not on file     Years of education: Not on file     Highest education level: Not on file   Occupational History     Not on file   Tobacco Use     Smoking status: Never     Smokeless tobacco: Never   Vaping Use     Vaping Use: Never used   Substance and Sexual Activity     Alcohol use: Yes     Comment: A couple of drinks most weeks.     Drug use: No     Sexual activity: Not Currently     Partners: Male   Other Topics Concern     Parent/sibling w/ CABG, MI or angioplasty before 65F 55M? Yes     Comment: My dad had angioplasty.  I don't know what age.   Social History Narrative     Not on file     Social Determinants of Health     Financial Resource Strain: Low Risk  (10/25/2023)    Financial Resource Strain      Within the past 12 months, have you or your family members you live with been unable to get utilities (heat, electricity) when it was really needed?: No   Food Insecurity: Low Risk  (10/25/2023)    Food Insecurity      Within the past 12 months, did you worry that your food would run out before you got money to buy more?: No      Within the past 12 months, did the food you bought just not last and you didn t have money to get more?: No   Transportation Needs: Low Risk  (10/25/2023)    Transportation Needs      Within the past 12 months, has lack of transportation kept you from medical appointments, getting your medicines, non-medical meetings or appointments, work, or from getting things that you need?: No   Physical Activity: Not on file   Stress: Not on file   Social Connections: Not on file   Interpersonal Safety: Low Risk  (10/31/2023)  "   Interpersonal Safety      Do you feel physically and emotionally safe where you currently live?: Yes      Within the past 12 months, have you been hit, slapped, kicked or otherwise physically hurt by someone?: No      Within the past 12 months, have you been humiliated or emotionally abused in other ways by your partner or ex-partner?: No   Housing Stability: Low Risk  (10/25/2023)    Housing Stability      Do you have housing? : Yes      Are you worried about losing your housing?: No        Review of Systems - Patient denies fever, chills, rash, wound, stiffness, limping, numbness, weakness, heart burn, blood in stool, chest pain with activity, calf pain when walking, shortness of breath with activity, chronic cough, easy bleeding/bruising, swelling of ankles, excessive thirst, fatigue, depression, anxiety.  Patient admits to ***.      OBJECTIVE:  Appearance: {appearance:924102::\"alert, well appearing, and in no distress\"}.    Pulse 97   Temp 97.7  F (36.5  C)   Resp 16   SpO2 97%      There is no height or weight on file to calculate BMI.     General appearance: Patient is alert and fully cooperative with history & exam.  No sign of distress is noted during the visit.  Psychiatric: Affect is pleasant & appropriate.  Patient appears motivated to improve health.  Respiratory: Breathing is regular & unlabored while sitting.  HEENT: Hearing is intact to spoken word.  Speech is clear.  No gross evidence of visual impairment that would impact ambulation.    Vascular: Dorsalis pedis and posterior tibial pulses are palpable. There is pedal hair growth ***.  CFT < 3 sec from anterior tibial surface to distal digits ***. There is no appreciable edema noted.  Dermatologic: Turgor and texture are within normal limits. No coloration or temperature changes. No primary or secondary lesions noted.  Neurologic: All epicritic and proprioceptive sensations are grossly intact ***.  Musculoskeletal: All active and passive ankle, " subtalar, midtarsal, and 1st MPJ range of motion are grossly intact without pain or crepitus, with the exception of ***. Manual muscle strength is ***. All dorsiflexors, plantarflexors, invertors, evertors are intact ***. Tenderness present to *** on palpation. Tenderness to *** with range of motion. Calf is soft/non-tender with/without warmth/induration    Imaging:     {Imaging order/result:53201}  No images are attached to the encounter or orders placed in the encounter.     XR Foot Bilateral G/E 3 Views    Result Date: 10/31/2023  XR FOOT BILATERAL G/E 3 VIEWS 10/31/2023 10:49 AM HISTORY: Pain in both feet; Pain in both feet COMPARISON: None.     IMPRESSION: Minimal plantar calcaneal spurring. No evidence for fracture. Mild hammertoe deformities. SAPNA RENE MD   SYSTEM ID:  [id number]     XR Foot Bilateral G/E 3 Views    Result Date: 10/31/2023  XR FOOT BILATERAL G/E 3 VIEWS 10/31/2023 10:49 AM HISTORY: Pain in both feet; Pain in both feet COMPARISON: None.     IMPRESSION: Minimal plantar calcaneal spurring. No evidence for fracture. Mild hammertoe deformities. SAPNA RENE MD   SYSTEM ID:  [id number]         TREATMENT:  ***    Michael Betts DPM  [facility] Foot & Ankle Surgery/Podiatry         Again, thank you for allowing me to participate in the care of your patient.        Sincerely,        Michael Whyte DPM

## 2023-11-08 NOTE — PROGRESS NOTES
FOOT AND ANKLE SURGERY/PODIATRY CONSULT NOTE         ASSESSMENT:   Plantar fasciitis bilateral feet  Calcaneal spur bilaterally      TREATMENT:  The patient was given a cortisone injection in both heels today each consisting of 1 cc of dexamethasone sodium phosphate and 1 cc of 2% lidocaine plain.  I recommended the patient continue to use his over-the-counter orthotics.  He is to return to the clinic in 1 week if his pain persist at which time I will recommend a second cortisone injection.  I informed the patient that if these conservative measures do not alleviate his symptoms he may require surgical intervention.        HPI: I was asked to see Elliot Norris today to evaluate and treat bilateral heel pain.  The patient indicated that he has had this heel pain for approximately 2 months.  The pain is located on the bottom of both heels.  He does have a past history of plantar fasciitis.  He was recently seen by his primary care physician.  X-rays of both feet were taken.  He was told he has heel spurs.  The patient describes the pain as a sharp pain which is aggravated with weightbearing and ambulation.  The patient stated he attended a dance exercise class which triggered his heel pain.  He stated that the right heel was more painful than the left.  The pain is more pronounced when he first gets out of bed in the mornings.  He has not had any associated redness or swelling.  The pain is relieved with nonweightbearing.  He has tried over-the-counter shoe inserts with minimal relief.  He has tried shoe modification as well.  The patient was seen in consultation at the request of Mary Foley MD for evaluation and treatment of bilateral heel pain.       Past Medical History:   Diagnosis Date    Arthritis 2016    A little bit.    Basal cell carcinoma     Cancer (H) 2009    Skin.    Depressive disorder     Occasionally, mild.    Diabetes (H)     Borderline    Hypertension 2016    About a year.       Social History      Socioeconomic History    Marital status: Single     Spouse name: Not on file    Number of children: Not on file    Years of education: Not on file    Highest education level: Not on file   Occupational History    Not on file   Tobacco Use    Smoking status: Never    Smokeless tobacco: Never   Vaping Use    Vaping Use: Never used   Substance and Sexual Activity    Alcohol use: Yes     Comment: A couple of drinks most weeks.    Drug use: No    Sexual activity: Not Currently     Partners: Male   Other Topics Concern    Parent/sibling w/ CABG, MI or angioplasty before 65F 55M? Yes     Comment: My dad had angioplasty.  I don't know what age.   Social History Narrative    Not on file     Social Determinants of Health     Financial Resource Strain: Low Risk  (10/25/2023)    Financial Resource Strain     Within the past 12 months, have you or your family members you live with been unable to get utilities (heat, electricity) when it was really needed?: No   Food Insecurity: Low Risk  (10/25/2023)    Food Insecurity     Within the past 12 months, did you worry that your food would run out before you got money to buy more?: No     Within the past 12 months, did the food you bought just not last and you didn t have money to get more?: No   Transportation Needs: Low Risk  (10/25/2023)    Transportation Needs     Within the past 12 months, has lack of transportation kept you from medical appointments, getting your medicines, non-medical meetings or appointments, work, or from getting things that you need?: No   Physical Activity: Not on file   Stress: Not on file   Social Connections: Not on file   Interpersonal Safety: Low Risk  (10/31/2023)    Interpersonal Safety     Do you feel physically and emotionally safe where you currently live?: Yes     Within the past 12 months, have you been hit, slapped, kicked or otherwise physically hurt by someone?: No     Within the past 12 months, have you been humiliated or emotionally  abused in other ways by your partner or ex-partner?: No   Housing Stability: Low Risk  (10/25/2023)    Housing Stability     Do you have housing? : Yes     Are you worried about losing your housing?: No          Allergies   Allergen Reactions    Seasonal Allergies           Current Outpatient Medications:     FLOWFLEX COVID-19 AG HOME TEST KIT, See Admin Instructions, Disp: , Rfl:     atorvastatin (LIPITOR) 20 MG tablet, TAKE 1 TABLET(20 MG) BY MOUTH DAILY, Disp: 90 tablet, Rfl: 3    diphenhydrAMINE (BENADRYL) 25 MG tablet, Take 25 mg by mouth every 6 hours as needed for itching or allergies, Disp: , Rfl:     fluorouracil (EFUDEX) 5 % external cream, Apply topically 2 times daily Mixed with equal parts calcipotriene cream to the scalp, forehead and temples. Will cause redness., Disp: 40 g, Rfl: 1    losartan (COZAAR) 25 MG tablet, Take 1 tablet (25 mg) by mouth daily, Disp: 30 tablet, Rfl: 1    Vitamin D, Cholecalciferol, 25 MCG (1000 UT) CAPS, Take by mouth daily, Disp: , Rfl:      Family History   Problem Relation Age of Onset    Diabetes Mother         Pancreatic cancer as well!    Arthritis Mother     Gynecology Mother     Pancreatic Cancer Mother     Obesity Mother     Diabetes Father     Hypertension Father     Alcohol/Drug Father     Circulatory Father     Depression Father     Heart Disease Father     Lipids Father     Cerebrovascular Disease Father          age 78    Anxiety Disorder Father     Substance Abuse Father         Alcohol    Obesity Father     Hyperlipidemia Father     Diabetes Maternal Grandmother     Cerebrovascular Disease Maternal Grandfather     Prostate Cancer Maternal Grandfather     Prostate Cancer Paternal Grandfather     Depression Paternal Grandmother     Depression Sister     Depression Sister     Obesity Sister         Social History     Socioeconomic History    Marital status: Single     Spouse name: Not on file    Number of children: Not on file    Years of education: Not on  file    Highest education level: Not on file   Occupational History    Not on file   Tobacco Use    Smoking status: Never    Smokeless tobacco: Never   Vaping Use    Vaping Use: Never used   Substance and Sexual Activity    Alcohol use: Yes     Comment: A couple of drinks most weeks.    Drug use: No    Sexual activity: Not Currently     Partners: Male   Other Topics Concern    Parent/sibling w/ CABG, MI or angioplasty before 65F 55M? Yes     Comment: My dad had angioplasty.  I don't know what age.   Social History Narrative    Not on file     Social Determinants of Health     Financial Resource Strain: Low Risk  (10/25/2023)    Financial Resource Strain     Within the past 12 months, have you or your family members you live with been unable to get utilities (heat, electricity) when it was really needed?: No   Food Insecurity: Low Risk  (10/25/2023)    Food Insecurity     Within the past 12 months, did you worry that your food would run out before you got money to buy more?: No     Within the past 12 months, did the food you bought just not last and you didn t have money to get more?: No   Transportation Needs: Low Risk  (10/25/2023)    Transportation Needs     Within the past 12 months, has lack of transportation kept you from medical appointments, getting your medicines, non-medical meetings or appointments, work, or from getting things that you need?: No   Physical Activity: Not on file   Stress: Not on file   Social Connections: Not on file   Interpersonal Safety: Low Risk  (10/31/2023)    Interpersonal Safety     Do you feel physically and emotionally safe where you currently live?: Yes     Within the past 12 months, have you been hit, slapped, kicked or otherwise physically hurt by someone?: No     Within the past 12 months, have you been humiliated or emotionally abused in other ways by your partner or ex-partner?: No   Housing Stability: Low Risk  (10/25/2023)    Housing Stability     Do you have housing? :  Yes     Are you worried about losing your housing?: No        Review of Systems - Patient denies fever, chills, rash, wound, stiffness, limping, numbness, weakness, heart burn, blood in stool, chest pain with activity, calf pain when walking, shortness of breath with activity, chronic cough, easy bleeding/bruising, swelling of ankles, excessive thirst, fatigue, depression, anxiety.  Patient admits to bilateral heel pain.      OBJECTIVE:  Appearance: alert, well appearing, and in no distress.    Pulse 97   Temp 97.7  F (36.5  C)   Resp 16   SpO2 97%      There is no height or weight on file to calculate BMI.     General appearance: Patient is alert and fully cooperative with history & exam.  No sign of distress is noted during the visit.  Psychiatric: Affect is pleasant & appropriate.  Patient appears motivated to improve health.  Respiratory: Breathing is regular & unlabored while sitting.  HEENT: Hearing is intact to spoken word.  Speech is clear.  No gross evidence of visual impairment that would impact ambulation.    Vascular: Dorsalis pedis and posterior tibial pulses are palpable. There is no pedal hair growth bilaterally.  CFT < 3 sec from anterior tibial surface to distal digits bilaterally. There is no appreciable edema noted.  Dermatologic: Turgor and texture are within normal limits. No coloration or temperature changes. No primary or secondary lesions noted.  Neurologic: All epicritic and proprioceptive sensations are grossly intact bilaterally.  Musculoskeletal: All active and passive ankle, subtalar, midtarsal, and 1st MPJ range of motion are grossly intact without pain or crepitus, with the exception of none. Manual muscle strength is within normal limits bilaterally. All dorsiflexors, plantarflexors, invertors, evertors are intact bilaterally. Tenderness present to the plantar medial aspect of both heels on palpation.  No tenderness to bilateral feet or ankles with range of motion. Calf is  soft/non-tender without warmth/induration    Imaging:       No images are attached to the encounter or orders placed in the encounter.     XR Foot Bilateral G/E 3 Views    Result Date: 10/31/2023  XR FOOT BILATERAL G/E 3 VIEWS 10/31/2023 10:49 AM HISTORY: Pain in both feet; Pain in both feet COMPARISON: None.     IMPRESSION: Minimal plantar calcaneal spurring. No evidence for fracture. Mild hammertoe deformities. SAPNA RENE MD   SYSTEM ID:  [id number]     XR Foot Bilateral G/E 3 Views    Result Date: 10/31/2023  XR FOOT BILATERAL G/E 3 VIEWS 10/31/2023 10:49 AM HISTORY: Pain in both feet; Pain in both feet COMPARISON: None.     IMPRESSION: Minimal plantar calcaneal spurring. No evidence for fracture. Mild hammertoe deformities. SAPNA RENE MD   SYSTEM ID:  [id number]       Michael Betts DPM  [facility] Foot & Ankle Surgery/Podiatry

## 2023-12-19 DIAGNOSIS — E78.5 HYPERLIPIDEMIA LDL GOAL <160: ICD-10-CM

## 2023-12-19 RX ORDER — ATORVASTATIN CALCIUM 20 MG/1
TABLET, FILM COATED ORAL
Qty: 90 TABLET | Refills: 3 | Status: SHIPPED | OUTPATIENT
Start: 2023-12-19

## 2023-12-27 ENCOUNTER — DOCUMENTATION ONLY (OUTPATIENT)
Dept: FAMILY MEDICINE | Facility: CLINIC | Age: 63
End: 2023-12-27
Payer: COMMERCIAL

## 2023-12-27 DIAGNOSIS — E55.9 HYPOVITAMINOSIS D: ICD-10-CM

## 2023-12-27 DIAGNOSIS — I10 PRIMARY HYPERTENSION: ICD-10-CM

## 2023-12-27 DIAGNOSIS — R73.03 PREDIABETES: ICD-10-CM

## 2023-12-27 DIAGNOSIS — E78.00 HYPERCHOLESTEREMIA: Primary | ICD-10-CM

## 2024-01-03 DIAGNOSIS — I10 PRIMARY HYPERTENSION: ICD-10-CM

## 2024-01-03 RX ORDER — LOSARTAN POTASSIUM 25 MG/1
25 TABLET ORAL DAILY
Qty: 60 TABLET | Refills: 0 | Status: SHIPPED | OUTPATIENT
Start: 2024-01-03 | End: 2024-02-01

## 2024-01-04 ENCOUNTER — LAB (OUTPATIENT)
Dept: LAB | Facility: CLINIC | Age: 64
End: 2024-01-04
Payer: COMMERCIAL

## 2024-01-04 DIAGNOSIS — I10 PRIMARY HYPERTENSION: ICD-10-CM

## 2024-01-04 DIAGNOSIS — E78.00 HYPERCHOLESTEREMIA: ICD-10-CM

## 2024-01-04 DIAGNOSIS — E55.9 HYPOVITAMINOSIS D: ICD-10-CM

## 2024-01-04 DIAGNOSIS — R73.03 PREDIABETES: ICD-10-CM

## 2024-01-04 LAB
ALBUMIN SERPL BCG-MCNC: 4.4 G/DL (ref 3.5–5.2)
ALP SERPL-CCNC: 68 U/L (ref 40–150)
ALT SERPL W P-5'-P-CCNC: 13 U/L (ref 0–70)
ANION GAP SERPL CALCULATED.3IONS-SCNC: 8 MMOL/L (ref 7–15)
AST SERPL W P-5'-P-CCNC: 19 U/L (ref 0–45)
BILIRUB SERPL-MCNC: 0.4 MG/DL
BUN SERPL-MCNC: 11.9 MG/DL (ref 8–23)
CALCIUM SERPL-MCNC: 9.5 MG/DL (ref 8.8–10.2)
CHLORIDE SERPL-SCNC: 106 MMOL/L (ref 98–107)
CHOLEST SERPL-MCNC: 205 MG/DL
CREAT SERPL-MCNC: 1.1 MG/DL (ref 0.67–1.17)
DEPRECATED HCO3 PLAS-SCNC: 28 MMOL/L (ref 22–29)
EGFRCR SERPLBLD CKD-EPI 2021: 75 ML/MIN/1.73M2
FASTING STATUS PATIENT QL REPORTED: YES
GLUCOSE SERPL-MCNC: 108 MG/DL (ref 70–99)
HBA1C MFR BLD: 6.1 % (ref 0–5.6)
HDLC SERPL-MCNC: 39 MG/DL
LDLC SERPL CALC-MCNC: 124 MG/DL
NONHDLC SERPL-MCNC: 166 MG/DL
POTASSIUM SERPL-SCNC: 4.1 MMOL/L (ref 3.4–5.3)
PROT SERPL-MCNC: 7.1 G/DL (ref 6.4–8.3)
SODIUM SERPL-SCNC: 142 MMOL/L (ref 135–145)
TRIGL SERPL-MCNC: 212 MG/DL
VIT D+METAB SERPL-MCNC: 30 NG/ML (ref 20–50)

## 2024-01-04 PROCEDURE — 82306 VITAMIN D 25 HYDROXY: CPT

## 2024-01-04 PROCEDURE — 80053 COMPREHEN METABOLIC PANEL: CPT

## 2024-01-04 PROCEDURE — 36415 COLL VENOUS BLD VENIPUNCTURE: CPT

## 2024-01-04 PROCEDURE — 83036 HEMOGLOBIN GLYCOSYLATED A1C: CPT

## 2024-01-04 PROCEDURE — 80061 LIPID PANEL: CPT

## 2024-01-08 ASSESSMENT — ENCOUNTER SYMPTOMS
CHILLS: 0
COUGH: 1
SORE THROAT: 0
DIARRHEA: 0
SHORTNESS OF BREATH: 0
NAUSEA: 0
PARESTHESIAS: 0
NERVOUS/ANXIOUS: 0
PALPITATIONS: 0
HEMATURIA: 0
ARTHRALGIAS: 0
FEVER: 0
EYE PAIN: 0
FREQUENCY: 0
DIZZINESS: 0
HEADACHES: 0
HEARTBURN: 0
ABDOMINAL PAIN: 0
HEMATOCHEZIA: 0
WEAKNESS: 0
DYSURIA: 0
CONSTIPATION: 0
JOINT SWELLING: 0
MYALGIAS: 0

## 2024-01-10 ENCOUNTER — OFFICE VISIT (OUTPATIENT)
Dept: FAMILY MEDICINE | Facility: CLINIC | Age: 64
End: 2024-01-10
Payer: COMMERCIAL

## 2024-01-10 VITALS
RESPIRATION RATE: 16 BRPM | HEIGHT: 72 IN | WEIGHT: 246.9 LBS | OXYGEN SATURATION: 96 % | BODY MASS INDEX: 33.44 KG/M2 | HEART RATE: 81 BPM | TEMPERATURE: 99.2 F | SYSTOLIC BLOOD PRESSURE: 147 MMHG | DIASTOLIC BLOOD PRESSURE: 89 MMHG

## 2024-01-10 DIAGNOSIS — I10 PRIMARY HYPERTENSION: ICD-10-CM

## 2024-01-10 DIAGNOSIS — M72.2 PLANTAR FASCIITIS: ICD-10-CM

## 2024-01-10 DIAGNOSIS — M79.672 PAIN IN BOTH FEET: ICD-10-CM

## 2024-01-10 DIAGNOSIS — M79.671 PAIN IN BOTH FEET: ICD-10-CM

## 2024-01-10 DIAGNOSIS — Z00.00 ROUTINE GENERAL MEDICAL EXAMINATION AT A HEALTH CARE FACILITY: Primary | ICD-10-CM

## 2024-01-10 DIAGNOSIS — E78.00 HYPERCHOLESTEREMIA: ICD-10-CM

## 2024-01-10 PROCEDURE — 99396 PREV VISIT EST AGE 40-64: CPT | Performed by: FAMILY MEDICINE

## 2024-01-10 PROCEDURE — 99214 OFFICE O/P EST MOD 30 MIN: CPT | Mod: 25 | Performed by: FAMILY MEDICINE

## 2024-01-10 ASSESSMENT — ENCOUNTER SYMPTOMS
SORE THROAT: 0
COUGH: 1
WEAKNESS: 0
CONSTIPATION: 0
SHORTNESS OF BREATH: 0
FEVER: 0
MYALGIAS: 0
HEADACHES: 0
DIARRHEA: 0
DYSURIA: 0
HEMATURIA: 0
EYE PAIN: 0
DIZZINESS: 0
ABDOMINAL PAIN: 0
JOINT SWELLING: 0
HEARTBURN: 0
FREQUENCY: 0
NERVOUS/ANXIOUS: 0
ARTHRALGIAS: 0
CHILLS: 0
PALPITATIONS: 0
NAUSEA: 0
HEMATOCHEZIA: 0
PARESTHESIAS: 0

## 2024-01-10 ASSESSMENT — PAIN SCALES - GENERAL: PAINLEVEL: MILD PAIN (3)

## 2024-01-10 NOTE — PROGRESS NOTES
SUBJECTIVE:   Vikram is a 63 year old, presenting for the following:  Physical        1/10/2024     9:33 AM   Additional Questions   Roomed by ARIANNA Alegre   Accompanied by N/A       Healthy Habits:     Getting at least 3 servings of Calcium per day:  Yes    Bi-annual eye exam:  Yes    Dental care twice a year:  Yes    Sleep apnea or symptoms of sleep apnea:  None and Sleep apnea    Diet:  Other    Frequency of exercise:  2-3 days/week    Duration of exercise:  30-45 minutes    Taking medications regularly:  Yes    Medication side effects:  None    Additional concerns today:  Yes    1) elevated triglycerides , we discussed fish oil supplements at 1000 mg twice a day with food   2) BP was elevated at first , pt states that it is normal at home on the 25 mg of losartan   3) plantar fasciitis has had a Steroid injection, has is using [employer] shoes , which are comfortable for him would like to get boots with , dorsiflexion boots  to use while sleeping           Social History     Tobacco Use    Smoking status: Never    Smokeless tobacco: Never   Substance Use Topics    Alcohol use: Yes     Comment: A couple of drinks most weeks.             1/8/2024     5:15 PM   Alcohol Use   Prescreen: >3 drinks/day or >7 drinks/week? No          No data to display                Last PSA:   PSA   Date Value Ref Range Status   02/04/2010 1.39 ng/mL        Reviewed orders with patient. Reviewed health maintenance and updated orders accordingly - Yes  Lab work is in process  Labs reviewed in EPIC  BP Readings from Last 3 Encounters:   01/10/24 (!) 147/89   10/31/23 130/80   10/10/23 132/74    Wt Readings from Last 3 Encounters:   01/10/24 112 kg (246 lb 14.4 oz)   10/31/23 111 kg (244 lb 12.8 oz)   10/10/23 111.7 kg (246 lb 4.8 oz)                  Patient Active Problem List   Diagnosis    ED (erectile dysfunction)    Hyperlipidemia LDL goal <160    Fatty liver    Insomnia    Obesity    Hypertension goal BP (blood pressure) < 140/90     Prediabetes    History of basal cell carcinoma    Scar    Depression, unspecified     Past Surgical History:   Procedure Laterality Date    APPENDECTOMY  1973    BIOPSY      Nose, skin.    COLONOSCOPY  2011    COLONOSCOPY N/A 2021    Procedure: COLONOSCOPY;  Surgeon: Arturo Townsend MD;  Location:  GI    MOHS MICROGRAPHIC PROCEDURE         Social History     Tobacco Use    Smoking status: Never    Smokeless tobacco: Never    Tobacco comments:     None.   Substance Use Topics    Alcohol use: Yes     Comment: A couple of drinks most weeks.     Family History   Problem Relation Age of Onset    Diabetes Mother         Pancreatic cancer as well!    Arthritis Mother     Gynecology Mother     Pancreatic Cancer Mother     Obesity Mother     Diabetes Father     Hypertension Father     Alcohol/Drug Father     Circulatory Father     Depression Father     Heart Disease Father     Lipids Father     Cerebrovascular Disease Father          age 78    Anxiety Disorder Father     Substance Abuse Father         Alcohol    Obesity Father     Hyperlipidemia Father     Diabetes Maternal Grandmother     Cerebrovascular Disease Maternal Grandfather     Prostate Cancer Maternal Grandfather     Prostate Cancer Paternal Grandfather     Depression Paternal Grandmother     Depression Sister     Depression Sister     Obesity Sister     Depression Niece          Current Outpatient Medications   Medication Sig Dispense Refill    atorvastatin (LIPITOR) 20 MG tablet TAKE 1 TABLET(20 MG) BY MOUTH DAILY 90 tablet 3    diphenhydrAMINE (BENADRYL) 25 MG tablet Take 25 mg by mouth every 6 hours as needed for itching or allergies      fluorouracil (EFUDEX) 5 % external cream Apply topically 2 times daily Mixed with equal parts calcipotriene cream to the scalp, forehead and temples. Will cause redness. 40 g 1    losartan (COZAAR) 25 MG tablet TAKE 1 TABLET(25 MG) BY MOUTH DAILY 60 tablet 0    Omega-3 Fish Oil 500 MG capsule Take 2 capsules  (1,000 mg) by mouth 2 times daily      vitamin D3 (CHOLECALCIFEROL) 50 mcg (2000 units) tablet Take 1 tablet (50 mcg) by mouth daily       Allergies   Allergen Reactions    Seasonal Allergies      Recent Labs   Lab Test 01/04/24  0738 12/28/22  0804 12/16/22  0740 11/09/21  0844 11/09/21  0844 09/21/20  0826 08/12/19  0812   A1C 6.1*  --  6.1*  --  5.9* 5.8* 5.8*   *  --  123*  --  125* 99 178*   HDL 39*  --  40  --  40 41 32*   TRIG 212*  --  252*  --  230* 240* 303*   ALT 13  --  23  --  35  --   --    CR 1.10  --  1.08   < > 1.01 1.08 1.07   GFRESTIMATED 75  --  78   < > 80 74 75   GFRESTBLACK  --   --   --   --   --  86 88   POTASSIUM 4.1  --  4.0  --  3.9 3.9 4.2   TSH  --  1.08  --   --   --   --   --     < > = values in this interval not displayed.        Reviewed and updated as needed this visit by clinical staff                  Reviewed and updated as needed this visit by Provider                 Past Medical History:   Diagnosis Date    Arthritis 2016    A little bit.    Basal cell carcinoma     Cancer (H) 2009    Skin.    Depressive disorder     Occasionally, mild.    Diabetes (H)     Borderline    Hypertension 2016    About a year.      Past Surgical History:   Procedure Laterality Date    APPENDECTOMY  1973    BIOPSY      Nose, skin.    COLONOSCOPY  2011    COLONOSCOPY N/A 12/16/2021    Procedure: COLONOSCOPY;  Surgeon: Arturo Townsend MD;  Location:  GI    MOHS MICROGRAPHIC PROCEDURE  2009       Review of Systems   Constitutional:  Negative for chills and fever.   HENT:  Negative for congestion, ear pain, hearing loss and sore throat.    Eyes:  Negative for pain and visual disturbance.   Respiratory:  Positive for cough. Negative for shortness of breath.    Cardiovascular:  Negative for chest pain, palpitations and peripheral edema.   Gastrointestinal:  Negative for abdominal pain, constipation, diarrhea, heartburn, hematochezia and nausea.   Genitourinary:  Negative for dysuria, frequency,  genital sores, hematuria, impotence, penile discharge and urgency.   Musculoskeletal:  Negative for arthralgias, joint swelling and myalgias.   Skin:  Negative for rash.   Neurological:  Negative for dizziness, weakness, headaches and paresthesias.   Psychiatric/Behavioral:  Negative for mood changes. The patient is not nervous/anxious.      CONSTITUTIONAL: NEGATIVE for fever, chills, change in weight  INTEGUMENTARY/SKIN: NEGATIVE for worrisome rashes, moles or lesions  EYES: NEGATIVE for vision changes or irritation  ENT: NEGATIVE for ear, mouth and throat problems  RESP: NEGATIVE for significant cough or SOB  CV: NEGATIVE for chest pain, palpitations or peripheral edema  GI: NEGATIVE for nausea, abdominal pain, heartburn, or change in bowel habits   male: negative for dysuria, hematuria, decreased urinary stream, erectile dysfunction, urethral discharge  MUSCULOSKELETAL: NEGATIVE for significant arthralgias or myalgia  NEURO: NEGATIVE for weakness, dizziness or paresthesias  PSYCHIATRIC: NEGATIVE for changes in mood or affect    OBJECTIVE:   There were no vitals taken for this visit.    Physical Exam  GENERAL: healthy, alert and no distress  EYES: Eyes grossly normal to inspection, PERRL and conjunctivae and sclerae normal  HENT: ear canals and TM's normal, nose and mouth without ulcers or lesions  NECK: no adenopathy, no asymmetry, masses, or scars and thyroid normal to palpation  RESP: lungs clear to auscultation - no rales, rhonchi or wheezes  CV: regular rate and rhythm, normal S1 S2, no S3 or S4, no murmur, click or rub, no peripheral edema and peripheral pulses strong  ABDOMEN: soft, nontender, no hepatosplenomegaly, no masses and bowel sounds normal  MS: no gross musculoskeletal defects noted, no edema  SKIN: no suspicious lesions or rashes  NEURO: Normal strength and tone, mentation intact and speech normal  PSYCH: mentation appears normal, affect normal/bright    Diagnostic Test Results:  Labs reviewed  "in Epic  No results found for any visits on 01/10/24.    ASSESSMENT/PLAN:   (Z00.00) Routine general medical examination at a health care facility  (primary encounter diagnosis)  Comment: he has done his screening labs already   Plan: PRIMARY CARE FOLLOW-UP SCHEDULING, REVIEW OF         HEALTH MAINTENANCE PROTOCOL ORDERS        See below     (E78.00) Hypercholesteremia  Comment: on atorvastatin 20 mg daily , no side effects   Plan: elevated triglycerides , we discussed fish oil supplements at 1000 mg twice a day with food. Pt will try this for six months , then we will recheck and see              (I10) Primary hypertension  Comment: BP was elevated at first , pt states that it is normal at home on the 25 mg of losartan  Plan: will not increase the losartan dose for now , he will continue to monitor at home and will follow up in 6 months if BP is under 140 over 90 , if elevated , we would need to discussed increasing the dose of the losartan to 50 mg     (M79.671,  M79.672) Pain in both feet  Comment: see below   Plan: from plantar fasciitis     (M72.2) Plantar fasciitis  Comment: he has seen podiatry and had steroid injections which only alleviated the pain for a while , he is interested in getting dorsiflexion boots to wear at night   Plan: Orthopedic  Referral        Given referral for podiatry to discuss the boots and other methods to treat the plantar fasciitis     Patient has been advised of split billing requirements and indicates understanding: Yes      COUNSELING:   Reviewed preventive health counseling, as reflected in patient instructions       Regular exercise       Healthy diet/nutrition       Vision screening       Hearing screening      BMI:   Estimated body mass index is 32.93 kg/m  as calculated from the following:    Height as of 10/31/23: 1.836 m (6' 0.3\").    Weight as of 10/31/23: 111 kg (244 lb 12.8 oz).         He reports that he has never smoked. He has never used smokeless " tobacco.          Mary Foley MD  [facility]

## 2024-01-19 ENCOUNTER — OFFICE VISIT (OUTPATIENT)
Dept: PODIATRY | Facility: CLINIC | Age: 64
End: 2024-01-19
Payer: COMMERCIAL

## 2024-01-19 VITALS — DIASTOLIC BLOOD PRESSURE: 80 MMHG | WEIGHT: 247.4 LBS | SYSTOLIC BLOOD PRESSURE: 142 MMHG | BODY MASS INDEX: 33.32 KG/M2

## 2024-01-19 DIAGNOSIS — Q66.72 PES CAVUS OF BOTH FEET: ICD-10-CM

## 2024-01-19 DIAGNOSIS — Q66.71 PES CAVUS OF BOTH FEET: ICD-10-CM

## 2024-01-19 DIAGNOSIS — M79.671 FOOT PAIN, BILATERAL: Primary | ICD-10-CM

## 2024-01-19 DIAGNOSIS — M72.2 PLANTAR FASCIITIS: ICD-10-CM

## 2024-01-19 DIAGNOSIS — M79.672 FOOT PAIN, BILATERAL: Primary | ICD-10-CM

## 2024-01-19 PROCEDURE — 99243 OFF/OP CNSLTJ NEW/EST LOW 30: CPT | Performed by: PODIATRIST

## 2024-01-19 NOTE — LETTER
1/19/2024         RE: Elliot Norris  [address]  [facility] [address]        Dear Colleague,    Thank you for referring your patient, Elliot Norris, to the [facility]. Please see a copy of my visit note below.    PATIENT HISTORY:  Dr. Foley requested I see this patient for their foot issue.  Elliot Norris is a 63 year old male who presents to clinic for pain to both feet.  Patient states that he has pain to both heels.  Can be a shooting pain to the arch and the heel.  Can be 9 out of 10.  Worse with rest.  He has tried shots and stretches and inserts but it has not really helped much.  Denies specific injury.  Wondering what can be done to get rid of the pain.    Review of Systems:  Patient denies fever, chills, rash, wound,  numbness, weakness, heart burn, blood in stool, chest pain with activity, calf pain when walking, shortness of breath with activity, chronic cough, easy bleeding/bruising, swelling of ankles, excessive thirst, fatigue, depression, anxiety.  Patient admits to stiffness, limping at times..     PAST MEDICAL HISTORY:   Past Medical History:   Diagnosis Date     Arthritis 2016    A little bit.     Basal cell carcinoma      Cancer (H) 2009    Skin.     Depressive disorder     Occasionally, mild.     Diabetes (H)     Borderline     Hypertension 2016    About a year.        PAST SURGICAL HISTORY:   Past Surgical History:   Procedure Laterality Date     APPENDECTOMY  1973     BIOPSY      Nose, skin.     COLONOSCOPY  2011     COLONOSCOPY N/A 12/16/2021    Procedure: COLONOSCOPY;  Surgeon: Arturo Townsend MD;  Location:  GI     MOHS MICROGRAPHIC PROCEDURE  2009        MEDICATIONS:   Current Outpatient Medications:      atorvastatin (LIPITOR) 20 MG tablet, TAKE 1 TABLET(20 MG) BY MOUTH DAILY, Disp: 90 tablet, Rfl: 3     diphenhydrAMINE (BENADRYL) 25 MG tablet, Take 25 mg by mouth every 6 hours as needed for itching or allergies, Disp: , Rfl:       fluorouracil (EFUDEX) 5 % external cream, Apply topically 2 times daily Mixed with equal parts calcipotriene cream to the scalp, forehead and temples. Will cause redness., Disp: 40 g, Rfl: 1     losartan (COZAAR) 25 MG tablet, TAKE 1 TABLET(25 MG) BY MOUTH DAILY, Disp: 60 tablet, Rfl: 0     Omega-3 Fish Oil 500 MG capsule, Take 2 capsules (1,000 mg) by mouth 2 times daily, Disp: , Rfl:      vitamin D3 (CHOLECALCIFEROL) 50 mcg (2000 units) tablet, Take 1 tablet (50 mcg) by mouth daily, Disp: , Rfl:      ALLERGIES:    Allergies   Allergen Reactions     Seasonal Allergies         SOCIAL HISTORY:   Social History     Socioeconomic History     Marital status: Single     Spouse name: Not on file     Number of children: Not on file     Years of education: Not on file     Highest education level: Not on file   Occupational History     Not on file   Tobacco Use     Smoking status: Never     Smokeless tobacco: Never     Tobacco comments:     None.   Vaping Use     Vaping Use: Never used   Substance and Sexual Activity     Alcohol use: Yes     Comment: A couple of drinks most weeks.     Drug use: No     Sexual activity: Not Currently     Partners: Male     Comment: I have never had and will never have sex with a woman.   Other Topics Concern     Parent/sibling w/ CABG, MI or angioplasty before 65F 55M? Yes     Comment: My dad had angioplasty.  I don't know what age.   Social History Narrative     Not on file     Social Determinants of Health     Financial Resource Strain: Low Risk  (1/8/2024)    Financial Resource Strain      Within the past 12 months, have you or your family members you live with been unable to get utilities (heat, electricity) when it was really needed?: No   Food Insecurity: Low Risk  (1/8/2024)    Food Insecurity      Within the past 12 months, did you worry that your food would run out before you got money to buy more?: No      Within the past 12 months, did the food you bought just not last and you  didn t have money to get more?: No   Transportation Needs: Low Risk  (2024)    Transportation Needs      Within the past 12 months, has lack of transportation kept you from medical appointments, getting your medicines, non-medical meetings or appointments, work, or from getting things that you need?: No   Physical Activity: Not on file   Stress: Not on file   Social Connections: Not on file   Interpersonal Safety: Low Risk  (1/10/2024)    Interpersonal Safety      Do you feel physically and emotionally safe where you currently live?: Yes      Within the past 12 months, have you been hit, slapped, kicked or otherwise physically hurt by someone?: No      Within the past 12 months, have you been humiliated or emotionally abused in other ways by your partner or ex-partner?: No   Housing Stability: Low Risk  (2024)    Housing Stability      Do you have housing? : Yes      Are you worried about losing your housing?: No        FAMILY HISTORY:   Family History   Problem Relation Age of Onset     Diabetes Mother         Pancreatic cancer as well!     Arthritis Mother      Gynecology Mother      Pancreatic Cancer Mother      Obesity Mother      Diabetes Father      Hypertension Father      Alcohol/Drug Father      Circulatory Father      Depression Father      Heart Disease Father      Lipids Father      Cerebrovascular Disease Father          age 78     Anxiety Disorder Father      Substance Abuse Father         Alcohol     Obesity Father      Hyperlipidemia Father      Diabetes Maternal Grandmother      Cerebrovascular Disease Maternal Grandfather      Prostate Cancer Maternal Grandfather      Prostate Cancer Paternal Grandfather      Depression Paternal Grandmother      Depression Sister      Depression Sister      Obesity Sister      Depression Niece         EXAM:Vitals: BP (!) 142/80   Wt 112.2 kg (247 lb 6.4 oz)   BMI 33.32 kg/m    BMI= Body mass index is 33.32 kg/m .    A1C: 6.1 (24)    General  appearance: Patient is alert and fully cooperative with history & exam.  No sign of distress is noted during the visit.     Psychiatric: Affect is pleasant & appropriate.  Patient appears motivated to improve health.     Respiratory: Breathing is regular & unlabored while sitting.     HEENT: Hearing is intact to spoken word.  Speech is clear.  No gross evidence of visual impairment that would impact ambulation.     Dermatologic: Skin is intact to both lower extremities without significant lesions, rash or abrasion.  No paronychia or evidence of soft tissue infection is noted.     Vascular: DP & PT pulses are intact & regular bilaterally.  No significant edema or varicosities noted.  CFT and skin temperature is normal to both lower extremities.     Neurologic: Lower extremity sensation is intact to light touch.  No evidence of weakness or contracture in the lower extremities.  No evidence of neuropathy.     Musculoskeletal: Patient is ambulatory without assistive device or brace.  Increased arch height.  Pain on palpation of the plantar medial aspects of both heels.  Right more than left.    Radiographs: Bilateral foot x-rays - I personally reviewed the xrays - Minimal plantar calcaneal spurring. No evidence for fracture. Mild hammertoe deformities.     ASSESSMENT:    Plantar fasciitis  Foot pain, bilateral  Pes cavus of both feet     Medical Decision Making/Plan:  [facility]. The potential causes and nature of plantar fasciitis were discussed with the patient.  We reviewed the natural history/prognosis of the condition and risks if left untreated.  These include chronic pain, other sites of pain due to gait changes, and potential plantar fascial rupture.      We discussed possible causes of the condition as it relates to the patients specific situation.      Conservative treatment options were reviewed:  appropriate shoes, avoidance of barefoot walking, inserts/orthoses, stretching, ice,  massage, immobilization and NSAIDs.     We also reviewed the options of injection therapy and surgery.  However, it was made clear that surgery is only considered when conservative therapy fails.  The risks and benefits of injection therapy, and surgery were discussed.     After thorough discussion and answering all questions, the patient elected to try night splints.  He will continue with stretches.  Recommend not going barefoot and using a topical pain cream.  We discussed custom orthotics but he would like to hold off on that.  Recommend Superfeet inserts green to wear in the shoes to help cushion and support the bottom of the foot.  Also talked about physical therapy with iontophoresis.  If pain continues this may be an option or may recommend an MRI of the ankle to assess for further pathology.  .   All questions were answered to patient's satisfaction and he will call further questions or concerns.    Patient risk factor: Patient is at low risk for infection.        Ruby Baird DPM, Podiatry/Foot and Ankle Surgery      Again, thank you for allowing me to participate in the care of your patient.        Sincerely,        Ruby Baird DPM, Podiatry/Foot and Ankle Surgery

## 2024-01-19 NOTE — PROGRESS NOTES
PATIENT HISTORY:  Dr. Foley requested I see this patient for their foot issue.  Elliot Norris is a 63 year old male who presents to clinic for pain to both feet.  Patient states that he has pain to both heels.  Can be a shooting pain to the arch and the heel.  Can be 9 out of 10.  Worse with rest.  He has tried shots and stretches and inserts but it has not really helped much.  Denies specific injury.  Wondering what can be done to get rid of the pain.    Review of Systems:  Patient denies fever, chills, rash, wound,  numbness, weakness, heart burn, blood in stool, chest pain with activity, calf pain when walking, shortness of breath with activity, chronic cough, easy bleeding/bruising, swelling of ankles, excessive thirst, fatigue, depression, anxiety.  Patient admits to stiffness, limping at times..     PAST MEDICAL HISTORY:   Past Medical History:   Diagnosis Date    Arthritis 2016    A little bit.    Basal cell carcinoma     Cancer (H) 2009    Skin.    Depressive disorder     Occasionally, mild.    Diabetes (H)     Borderline    Hypertension 2016    About a year.        PAST SURGICAL HISTORY:   Past Surgical History:   Procedure Laterality Date    APPENDECTOMY  1973    BIOPSY      Nose, skin.    COLONOSCOPY  2011    COLONOSCOPY N/A 12/16/2021    Procedure: COLONOSCOPY;  Surgeon: Arturo Townsend MD;  Location:  GI    MOHS MICROGRAPHIC PROCEDURE  2009        MEDICATIONS:   Current Outpatient Medications:     atorvastatin (LIPITOR) 20 MG tablet, TAKE 1 TABLET(20 MG) BY MOUTH DAILY, Disp: 90 tablet, Rfl: 3    diphenhydrAMINE (BENADRYL) 25 MG tablet, Take 25 mg by mouth every 6 hours as needed for itching or allergies, Disp: , Rfl:     fluorouracil (EFUDEX) 5 % external cream, Apply topically 2 times daily Mixed with equal parts calcipotriene cream to the scalp, forehead and temples. Will cause redness., Disp: 40 g, Rfl: 1    losartan (COZAAR) 25 MG tablet, TAKE 1 TABLET(25 MG) BY MOUTH DAILY, Disp: 60  tablet, Rfl: 0    Omega-3 Fish Oil 500 MG capsule, Take 2 capsules (1,000 mg) by mouth 2 times daily, Disp: , Rfl:     vitamin D3 (CHOLECALCIFEROL) 50 mcg (2000 units) tablet, Take 1 tablet (50 mcg) by mouth daily, Disp: , Rfl:      ALLERGIES:    Allergies   Allergen Reactions    Seasonal Allergies         SOCIAL HISTORY:   Social History     Socioeconomic History    Marital status: Single     Spouse name: Not on file    Number of children: Not on file    Years of education: Not on file    Highest education level: Not on file   Occupational History    Not on file   Tobacco Use    Smoking status: Never    Smokeless tobacco: Never    Tobacco comments:     None.   Vaping Use    Vaping Use: Never used   Substance and Sexual Activity    Alcohol use: Yes     Comment: A couple of drinks most weeks.    Drug use: No    Sexual activity: Not Currently     Partners: Male     Comment: I have never had and will never have sex with a woman.   Other Topics Concern    Parent/sibling w/ CABG, MI or angioplasty before 65F 55M? Yes     Comment: My dad had angioplasty.  I don't know what age.   Social History Narrative    Not on file     Social Determinants of Health     Financial Resource Strain: Low Risk  (1/8/2024)    Financial Resource Strain     Within the past 12 months, have you or your family members you live with been unable to get utilities (heat, electricity) when it was really needed?: No   Food Insecurity: Low Risk  (1/8/2024)    Food Insecurity     Within the past 12 months, did you worry that your food would run out before you got money to buy more?: No     Within the past 12 months, did the food you bought just not last and you didn t have money to get more?: No   Transportation Needs: Low Risk  (1/8/2024)    Transportation Needs     Within the past 12 months, has lack of transportation kept you from medical appointments, getting your medicines, non-medical meetings or appointments, work, or from getting things that you  need?: No   Physical Activity: Not on file   Stress: Not on file   Social Connections: Not on file   Interpersonal Safety: Low Risk  (1/10/2024)    Interpersonal Safety     Do you feel physically and emotionally safe where you currently live?: Yes     Within the past 12 months, have you been hit, slapped, kicked or otherwise physically hurt by someone?: No     Within the past 12 months, have you been humiliated or emotionally abused in other ways by your partner or ex-partner?: No   Housing Stability: Low Risk  (2024)    Housing Stability     Do you have housing? : Yes     Are you worried about losing your housing?: No        FAMILY HISTORY:   Family History   Problem Relation Age of Onset    Diabetes Mother         Pancreatic cancer as well!    Arthritis Mother     Gynecology Mother     Pancreatic Cancer Mother     Obesity Mother     Diabetes Father     Hypertension Father     Alcohol/Drug Father     Circulatory Father     Depression Father     Heart Disease Father     Lipids Father     Cerebrovascular Disease Father          age 78    Anxiety Disorder Father     Substance Abuse Father         Alcohol    Obesity Father     Hyperlipidemia Father     Diabetes Maternal Grandmother     Cerebrovascular Disease Maternal Grandfather     Prostate Cancer Maternal Grandfather     Prostate Cancer Paternal Grandfather     Depression Paternal Grandmother     Depression Sister     Depression Sister     Obesity Sister     Depression Niece         EXAM:Vitals: BP (!) 142/80   Wt 112.2 kg (247 lb 6.4 oz)   BMI 33.32 kg/m    BMI= Body mass index is 33.32 kg/m .    A1C: 6.1 (24)    General appearance: Patient is alert and fully cooperative with history & exam.  No sign of distress is noted during the visit.     Psychiatric: Affect is pleasant & appropriate.  Patient appears motivated to improve health.     Respiratory: Breathing is regular & unlabored while sitting.     HEENT: Hearing is intact to spoken word.  Speech  is clear.  No gross evidence of visual impairment that would impact ambulation.     Dermatologic: Skin is intact to both lower extremities without significant lesions, rash or abrasion.  No paronychia or evidence of soft tissue infection is noted.     Vascular: DP & PT pulses are intact & regular bilaterally.  No significant edema or varicosities noted.  CFT and skin temperature is normal to both lower extremities.     Neurologic: Lower extremity sensation is intact to light touch.  No evidence of weakness or contracture in the lower extremities.  No evidence of neuropathy.     Musculoskeletal: Patient is ambulatory without assistive device or brace.  Increased arch height.  Pain on palpation of the plantar medial aspects of both heels.  Right more than left.    Radiographs: Bilateral foot x-rays - I personally reviewed the xrays - Minimal plantar calcaneal spurring. No evidence for fracture. Mild hammertoe deformities.     ASSESSMENT:    Plantar fasciitis  Foot pain, bilateral  Pes cavus of both feet     Medical Decision Making/Plan:  Reviewed patient's chart in [facility]. The potential causes and nature of plantar fasciitis were discussed with the patient.  We reviewed the natural history/prognosis of the condition and risks if left untreated.  These include chronic pain, other sites of pain due to gait changes, and potential plantar fascial rupture.      We discussed possible causes of the condition as it relates to the patients specific situation.      Conservative treatment options were reviewed:  appropriate shoes, avoidance of barefoot walking, inserts/orthoses, stretching, ice, massage, immobilization and NSAIDs.     We also reviewed the options of injection therapy and surgery.  However, it was made clear that surgery is only considered when conservative therapy fails.  The risks and benefits of injection therapy, and surgery were discussed.     After thorough discussion and answering all questions, the patient  elected to try night splints.  He will continue with stretches.  Recommend not going barefoot and using a topical pain cream.  We discussed custom orthotics but he would like to hold off on that.  Recommend Superfeet inserts green to wear in the shoes to help cushion and support the bottom of the foot.  Also talked about physical therapy with iontophoresis.  If pain continues this may be an option or may recommend an MRI of the ankle to assess for further pathology.  .   All questions were answered to patient's satisfaction and he will call further questions or concerns.    Patient risk factor: Patient is at low risk for infection.        Ruby Baird DPM, Podiatry/Foot and Ankle Surgery

## 2024-01-19 NOTE — PATIENT INSTRUCTIONS
Thank you for choosing [facility] Podiatry / Foot & Ankle Surgery!    DR FISCHER'S CLINIC:  [facility]   [address]   [place] [address]   (Tues, Wed, Thurs AM, Fri PM)      TRIAGE LINE: [phone]  APPOINTMENTS: [phone]  RADIOLOGY: [phone]  SET UP SURGERY: [phone]  PHYSICAL THERAPY: [phone]   BILLING QUESTIONS: [phone]  FAX: [phone]       Follow up: as needed.     Recommend Superfeet inserts the green color.  Can get these  shoe store or online.    PLANTAR FASCIITIS  Plantar fasciitis is often referred to as heel spurs or heel pain. Plantar fasciitis is a very common problem that affects people of all foot shapes, age, weight and activity level. Pain may be in the arch or on the weight-bearing surface of the heel. The pain may come on without injury or identifiable cause. Pain is generally present when first getting out of bed in the morning or up from a seated break.     CAUSES  The plantar fascia is a dense fibrous band of tissue that stretches across the bottom surface of the foot. The fascia helps support the foot muscles and arch. Plantar fasciitis is thought to be caused by mechanical strain or overload. Frequent walking without shoes or wearing unsupportive shoes is thought to cause structural overload and ultimately inflammation of the plantar fascia. Some people have heel spurs that can be seen on x-ray. The heel spur is actually a minor component of plantar fascitis and is largely ignored.       SELF TREATMENT   The easiest solution is to stop walking around your home without shoes. Plantar fasciitis is largely a shoe problem. Shoes are either not being worn often enough or your current shoes are inadequate for your weight, foot structure or activity level. The majority of shoes on the market today are not sufficient to resist development of plantar fasciitis or to promote healing. Assume that your current shoes are inadequate and will  need to be replaced. Even high quality shoes wear out with 6 months to one year of frequent use. Weight loss is another option. Losing ten pounds in the next two months may be enough to resolve the problem. Ice applied to the area of pain two to three times per day for ten minutes each session can be very helpful. Warm foot soaks in epsom salts can also relieve pain. This should continue until the problem resolves. Achilles tendon stretching is essential. Stretch multiple times daily to promote healing and to prevent recurrence in the future. Over all stretching of the body is helpful as well such as the calves, thighs and lower back. Normally when one area of the body is tight, other areas are too. Gentle Yoga can be good for this.     Over the counter topical anti inflammatories can be helpful such as biofreeze, bengay, salon pas, ect...  Oral ibuprofen or aleve is recommended as well to try to calm down inflammation.     Night splints can be helpful to gradually stretch the foot at night as a lot of pain is when you get up in the morning. Taking a towel or thera band and stretching the foot back multiple times before you get ou of bed can be beneficial as well.     MEDICAL TREATMENT  Medical treatments often include custom arch supports, cortisone injections, physical therapy, splints to be worn in bed, prescription medications and surgery. The home treatments listed above will be necessary regardless of these advanced medical treatments. Surgery is rarely needed but is very helpful in selected cases.     PROGNOSIS  Plantar fasciitis can last from one day to a lifetime. Some people get intermittent fascitis that is very short-lived. Others suffer daily for years. Excessive body weight, frequent bare foot walking, long hours on the feet, inadequate shoes, predisposing foot structures and excessive activity such as running are all potential issues that lead to chronic and/or recurring plantar fascitis. Having  plantar fasciitis means that you are forever prone to this problem and will require modification of some of the above factors. Most people seek treatment within one to four months. Healing usually requires a similar one to four month time frame. Healing time is relative to the amount of effort spent treating the problem.   Plantar fasciitis is highly recurrent. Risk factors often continue, including return to bare foot walking, inadequate shoes, excessive body weight, excessive activities, etc. Your life style and foot structure may predispose you to recurrent plantar fasciitis. A daily prevention regimen can be very helpful. Ongoing use of shoe inserts, careful attention to appropriate shoes, daily Achilles stretching, etc. may prevent recurrence. Prompt attention at the earliest warning signs of heel pain can resolve the problem in as short as a few days.     EXERCISES  Stair Exercise: Step on the stairs with the ball of your foot and hold your position for at least 15 seconds, then slowly step down with the heels of your foot. You can do this daily and as often as you want.   Picking the Towel: Sit comfortably and then pick the towel up with your toes. You can use any object other than a towel as long as the material can be soft and you can pick it up with your toes.  Rolling the Bottle: Use a small ball or frozen water bottle and then roll it around with your foot.   Flex the Toes: Sit comfortably and then flex your toes by pointing it towards the floor or towards your body. This will relax and flex your foot and exercise your plantar fascia, the calf, and the Achilles tendon. The inability of the foot to stretch often causes the bunching up of the plantar fascia area leading to the pain.  Calf/Achilles Stretching: Lay on you back and raise one foot, then point your toes towards the floor. See photo below:               Hold each stretch for 10 seconds. Stretch 10 times per set, three sets per day. Morning,  afternoon and evening. If your heel pain is very severe in the morning, consider doing the first set of stretches before you get out of bed.      OVER THE COUNTER INSERT RECOMMENDATIONS  SuperFeet   Sofsole Fit Spenco   Power Step   Walk-Fit Arch Cradles     Most of these can be found at your local [employer], [employer], or online.  **A good high quality over the counter insert should cost around $40-$50      [facility] LOCATIONS  [place]  [address] [place] [address]  [phone]   [address]  [phone] [place]  [address] [place] [address]  [phone]   [place]  [address]  [phone]   [place]  [address]  [phone] [place] [address]  [phone]   [place]  [address]  [phone]   [place]  [address]  [phone] [place]  [address]  [phone]

## 2024-02-01 DIAGNOSIS — I10 PRIMARY HYPERTENSION: ICD-10-CM

## 2024-02-02 RX ORDER — LOSARTAN POTASSIUM 25 MG/1
25 TABLET ORAL DAILY
Qty: 60 TABLET | Refills: 1 | Status: SHIPPED | OUTPATIENT
Start: 2024-02-02 | End: 2024-04-16

## 2024-04-02 ENCOUNTER — OFFICE VISIT (OUTPATIENT)
Dept: FAMILY MEDICINE | Facility: CLINIC | Age: 64
End: 2024-04-02
Payer: COMMERCIAL

## 2024-04-02 DIAGNOSIS — Z12.83 SKIN CANCER SCREENING: Primary | ICD-10-CM

## 2024-04-02 DIAGNOSIS — L57.0 ACTINIC KERATOSIS: ICD-10-CM

## 2024-04-02 DIAGNOSIS — Z85.828 HISTORY OF NONMELANOMA SKIN CANCER: ICD-10-CM

## 2024-04-02 DIAGNOSIS — L82.1 SEBORRHEIC KERATOSES: ICD-10-CM

## 2024-04-02 DIAGNOSIS — L81.4 SOLAR LENTIGO: ICD-10-CM

## 2024-04-02 DIAGNOSIS — D22.9 MULTIPLE BENIGN NEVI: ICD-10-CM

## 2024-04-02 DIAGNOSIS — D18.01 CHERRY ANGIOMA: ICD-10-CM

## 2024-04-02 DIAGNOSIS — Z86.007 HISTORY OF SQUAMOUS CELL CARCINOMA IN SITU (SCCIS) OF SKIN: ICD-10-CM

## 2024-04-02 PROCEDURE — 17003 DESTRUCT PREMALG LES 2-14: CPT | Performed by: PHYSICIAN ASSISTANT

## 2024-04-02 PROCEDURE — 99213 OFFICE O/P EST LOW 20 MIN: CPT | Mod: 25 | Performed by: PHYSICIAN ASSISTANT

## 2024-04-02 PROCEDURE — 17000 DESTRUCT PREMALG LESION: CPT | Performed by: PHYSICIAN ASSISTANT

## 2024-04-02 RX ORDER — IBUPROFEN 200 MG
400 TABLET ORAL EVERY 4 HOURS PRN
COMMUNITY
End: 2024-04-16

## 2024-04-02 ASSESSMENT — PAIN SCALES - GENERAL: PAINLEVEL: NO PAIN (0)

## 2024-04-02 NOTE — PATIENT INSTRUCTIONS
Checking for Skin Cancer  You can help find cancer early by checking your skin each month. There are 3 main kinds of skin cancer: melanoma, basal cell carcinoma, and squamous cell carcinoma. Doing monthly skin checks is the best way to find new marks, sores, or skin changes. Follow these instructions for checking your skin.   The ABCDEs of checking moles for melanoma   Check your moles or growths for signs of melanoma using ABCDE:   Asymmetry: The sides of the mole or growth don t match.  Border: The edges are ragged, notched, or blurred.  Color: The color within the mole or growth varies. It could be black, brown, tan, white, or shades of red, gray, or blue.  Diameter: The mole or growth is larger than   inch or 6 mm (size of a pencil eraser).  Evolving: The size, shape, texture, or color of the mole or growth is changing.     ABCDE's of moles on light skin.        ABCDE's of moles on dark skin may be harder to identify.     Checking for other types of skin cancer  Basal cell carcinoma or squamous cell carcinoma cause symptoms like:     A spot or mole that looks different from all other marks on your skin  Changes in how an area feels, such as itching, tenderness, or pain  Changes in the skin's surface, such as oozing, bleeding, or scaliness  A sore that doesn't heal  New swelling, redness, or spread of color beyond the border of a mole    Who s at risk?  Anyone of any skin color can get skin cancer. But you're at greater risk if you have:   Fair skin that freckles easily and burns instead of tanning  Light-colored or red hair  Light-colored eyes  Many moles or abnormal moles on your skin  A long history of unprotected exposure to sunlight or tanning beds  A history of many blistering sunburns as a child or teen  A family history of skin cancer  Been exposed to radiation or chemicals  A weakened immune system  Been exposed to arsenic  If you've had skin cancer in the past, you're at high risk of having it again.    How to check your skin  Do your monthly skin checkups in front of a full-length mirror. Use a room with good lighting so it's easier to see. Use a hand mirror to look at hard-to-see places like your buttocks and back. You can also have a trusted friend or family member help you with these checks. Check every part of your body, including your:   Head (ears, face, neck, and scalp)  Torso (front, back, sides, and under breasts)  Arms (tops, undersides, and armpits)  Hands (palms, backs, and fingers, including under the nails)  Lower back, buttocks, and genitals  Legs (front, back, and sides)  Feet (tops, soles, toes, including under the nails, and between toes)  Watch for new spots on your skin or a spot that's changing in color, shape, size.   If you have a lot of moles, take digital photos of them each month. Make sure to take photos both up close and from a distance. These can help you see if any moles change over time.   Know your skin  Most skin changes aren't cancer. But if you see any changes in your skin, call your healthcare provider right away. Only they can tell you if a change is a problem. If you have skin cancer, seeing your provider can be the first step to getting the treatment that could save your life.   [employer] last reviewed this educational content on 10/1/2021    5924-5443 [employer]. All rights reserved. This information is not intended as a substitute for professional medical care. Always follow your healthcare professional's instructions.     Cryotherapy    What is it?  Use of a very cold liquid, such as liquid nitrogen, to freeze and destroy abnormal skin cells that need to be removed    What should I expect?  Tenderness and redness  A small blister that might grow and fill with dark purple blood. There may be crusting.  More than one treatment may be needed if the lesions do not go away.    How do I care for the treated area?  Gently wash the area with your hands when  bathing.  Use a thin layer of Vaseline to help with healing. You may use a Band-Aid.   The area should heal within 7-10 days and may leave behind a pink or lighter color.   Do not use an antibiotic or Neosporin ointment.   You may take acetaminophen (Tylenol) for pain.     Call your doctor if you have:  Severe pain  Signs of infection (warmth, redness, cloudy yellow drainage, and or a bad smell)  Questions or concerns    Who should I call with questions?      [facility]: [phone]      [facility]: [phone]      For urgent needs outside of business hours call the [facility] at [phone] and ask for the dermatology resident on call

## 2024-04-02 NOTE — LETTER
4/2/2024         RE: Elliot Norris  [address]  [facility] [address]        Dear Colleague,    Thank you for referring your patient, Elliot Norris, to the [facility]. Please see a copy of my visit note below.    [facility] Dermatology Note  Encounter Date: Apr 2, 2024  Office Visit     Dermatology Problem List:  1. 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 Dysplastic nevi  - mild atypia, left abdomen, s/p shave bx 5/14/2018  - mild atypia, back midline at t4, s/p shave bx 5/14/2018  3. AKs s/p cryotherapy - 4/11/2023, 04/02/2024  - current tx: Efudex 5% cream   4. Fibroepithelial polyp (acrochordon), right axilla, s/p shave bx 4/11/2023  ____________________________________________    Assessment & Plan:    # Skin cancer screening with multiple benign nevi, solar lentigines  - ABCDEs: Counseled ABCDEs of melanoma: Asymmetry, Border (irregularity), Color (not uniform, changes in color), Diameter (greater than 6 mm which is about the size of a pencil eraser), and Evolving (any changes in preexisting moles).  - Sun protection: Counseled SPF30+ sunscreen, UPF clothing, sun avoidance, tanning bed avoidance.    # Cherry angiomas and seborrheic keratoses,  Benign, reassurance given.     # Hx NMSC.  -No signs of recurrence  - Continue regular skin examinations  - Sun precaution was advised including the use of sun screens of SPF 30 or higher, sun protective clothing, and avoidance of tanning beds.    # AK x7 scalp, x1 L upper arm  - cryotherapy performed today, see procedure note below  - continue Efudex 5% cream bid self mixed with calcipotriene (every 3 months on the scalp) after areas heal from Cryotherapy    Procedures Performed:   - Cryotherapy procedure note: After verbal consent and discussion of risks and benefits including  but no limited to dyspigmentation/scar, blister, and pain, 8 AK was(were) treated with 1-2mm freeze border for 2 cycles with liquid nitrogen. Post cryotherapy instructions were provided.       Follow-up: 6 month(s) in-person, or earlier for new or changing lesions    Staff and Scribe:   Scribe Disclosure:   By signing my name below, I, Lizbeth Florariacabrera, attest that this documentation has been prepared under the direction and in the presence of Delores Stout PA-C.  - Electronically Signed: Lizbeth Guerra 04/02/24       Provider Disclosure:  I agree with above History, Review of Systems, Physical exam and Plan.  I have reviewed the content of the documentation and have edited it as needed. I have personally performed the services documented here and the documentation accurately represents those services and the decisions I have made.      Electronically signed by:  Provider Disclosure:   The documentation recorded by the scribe accurately reflects the services I personally performed and the decisions made by me.    All risks, benefits and alternatives were discussed with patient.  Patient is in agreement and understands the assessment and plan.  All questions were answered.  Sun Screen Education was given.   Return to Clinic in 6 months or sooner as needed.   Delores Stout PA-C   [facility]        ____________________________________________    CC: Skin Check (FBSC)    HPI:  Mr. Elliot Norris is a(n) 63 year old male who presents today as a return patient for a FBSE. He was last seen 10/30/2023 in dermatology by Dr. Limon.    Patient reports his previous area of SCC is recurrent, and has another area that did not present until after chemotherapy. Denies soreness. He uses bandanas for sun protection.    Patient is otherwise feeling well, without additional skin concerns.    Labs Reviewed:  N/A    Physical exam:  Vitals: There were no vitals taken for this visit.  GEN: This is  a well developed, well-nourished male in no acute distress, in a pleasant mood.    SKIN: Full skin, which includes the head/face, both arms, chest, back, abdomen,both legs, genitalia and/or groin buttocks, digits and/or nails, was examined.  - There are 7 erythematous macules with overyling adherent scale on the scalp, x1 L upper arm.   - There are dome shaped bright red papules on the head/neck, trunk, extremities.   - Multiple regular brown pigmented macules and papules are identified on the head/neck, trunk, extremities.   - Scattered brown macules on sun exposed areas.  - There are waxy stuck on tan to brown papules on the head/neck, trunk, extremities.  - No other lesions of concern on areas examined.       Medications:  Current Outpatient Medications   Medication     atorvastatin (LIPITOR) 20 MG tablet     diphenhydrAMINE (BENADRYL) 25 MG tablet     fluorouracil (EFUDEX) 5 % external cream     losartan (COZAAR) 25 MG tablet     Omega-3 Fish Oil 500 MG capsule     vitamin D3 (CHOLECALCIFEROL) 50 mcg (2000 units) tablet     No current facility-administered medications for this visit.      Past Medical History:   Patient Active Problem List   Diagnosis     ED (erectile dysfunction)     Hyperlipidemia LDL goal <160     Fatty liver     Insomnia     Obesity     Hypertension goal BP (blood pressure) < 140/90     Prediabetes     History of basal cell carcinoma     Scar     Depression, unspecified     Past Medical History:   Diagnosis Date     Arthritis 2016    A little bit.     Basal cell carcinoma      Cancer (H) 2009    Skin.     Depressive disorder     Occasionally, mild.     Diabetes (H)     Borderline     Hypertension 2016    About a year.            Again, thank you for allowing me to participate in the care of your patient.        Sincerely,        Delores Stout PA-C

## 2024-04-02 NOTE — PROGRESS NOTES
[facility] Dermatology Note  Encounter Date: Apr 2, 2024  Office Visit     Dermatology Problem List:  1. 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 Dysplastic nevi  - mild atypia, left abdomen, s/p shave bx 5/14/2018  - mild atypia, back midline at t4, s/p shave bx 5/14/2018  3. AKs s/p cryotherapy - 4/11/2023, 04/02/2024  - current tx: Efudex 5% cream   4. Fibroepithelial polyp (acrochordon), right axilla, s/p shave bx 4/11/2023  ____________________________________________    Assessment & Plan:    # Skin cancer screening with multiple benign nevi, solar lentigines  - ABCDEs: Counseled ABCDEs of melanoma: Asymmetry, Border (irregularity), Color (not uniform, changes in color), Diameter (greater than 6 mm which is about the size of a pencil eraser), and Evolving (any changes in preexisting moles).  - Sun protection: Counseled SPF30+ sunscreen, UPF clothing, sun avoidance, tanning bed avoidance.    # Cherry angiomas and seborrheic keratoses,  Benign, reassurance given.     # Hx NMSC.  -No signs of recurrence  - Continue regular skin examinations  - Sun precaution was advised including the use of sun screens of SPF 30 or higher, sun protective clothing, and avoidance of tanning beds.    # AK x7 scalp, x1 L upper arm  - cryotherapy performed today, see procedure note below  - continue Efudex 5% cream bid self mixed with calcipotriene (every 3 months on the scalp) after areas heal from Cryotherapy    Procedures Performed:   - Cryotherapy procedure note: After verbal consent and discussion of risks and benefits including but no limited to dyspigmentation/scar, blister, and pain, 8 AK was(were) treated with 1-2mm freeze border for 2 cycles with liquid nitrogen. Post cryotherapy instructions were provided.       Follow-up: 6 month(s) in-person, or earlier for new or changing  lesions    Staff and Scribe:   Scribe Disclosure:   By signing my name below, I, Lizbeth Guerra, attest that this documentation has been prepared under the direction and in the presence of Delores Stout PA-C.  - Electronically Signed: Lizbeth Guerra 04/02/24       Provider Disclosure:  I agree with above History, Review of Systems, Physical exam and Plan.  I have reviewed the content of the documentation and have edited it as needed. I have personally performed the services documented here and the documentation accurately represents those services and the decisions I have made.      Electronically signed by:  Provider Disclosure:   The documentation recorded by the scribe accurately reflects the services I personally performed and the decisions made by me.    All risks, benefits and alternatives were discussed with patient.  Patient is in agreement and understands the assessment and plan.  All questions were answered.  Sun Screen Education was given.   Return to Clinic in 6 months or sooner as needed.   Delores Stout PA-C   [facility]        ____________________________________________    CC: Skin Check (FBSC)    HPI:  Mr. Elliot Norris is a(n) 63 year old male who presents today as a return patient for a FBSE. He was last seen 10/30/2023 in dermatology by Dr. Limon.    Patient reports his previous area of SCC is recurrent, and has another area that did not present until after chemotherapy. Denies soreness. He uses bandanas for sun protection.    Patient is otherwise feeling well, without additional skin concerns.    Labs Reviewed:  N/A    Physical exam:  Vitals: There were no vitals taken for this visit.  GEN: This is a well developed, well-nourished male in no acute distress, in a pleasant mood.    SKIN: Full skin, which includes the head/face, both arms, chest, back, abdomen,both legs, genitalia and/or groin buttocks, digits and/or nails, was examined.  - There are 7  erythematous macules with overyling adherent scale on the scalp, x1 L upper arm.   - There are dome shaped bright red papules on the head/neck, trunk, extremities.   - Multiple regular brown pigmented macules and papules are identified on the head/neck, trunk, extremities.   - Scattered brown macules on sun exposed areas.  - There are waxy stuck on tan to brown papules on the head/neck, trunk, extremities.  - No other lesions of concern on areas examined.       Medications:  Current Outpatient Medications   Medication    atorvastatin (LIPITOR) 20 MG tablet    diphenhydrAMINE (BENADRYL) 25 MG tablet    fluorouracil (EFUDEX) 5 % external cream    losartan (COZAAR) 25 MG tablet    Omega-3 Fish Oil 500 MG capsule    vitamin D3 (CHOLECALCIFEROL) 50 mcg (2000 units) tablet     No current facility-administered medications for this visit.      Past Medical History:   Patient Active Problem List   Diagnosis    ED (erectile dysfunction)    Hyperlipidemia LDL goal <160    Fatty liver    Insomnia    Obesity    Hypertension goal BP (blood pressure) < 140/90    Prediabetes    History of basal cell carcinoma    Scar    Depression, unspecified     Past Medical History:   Diagnosis Date    Arthritis 2016    A little bit.    Basal cell carcinoma     Cancer (H) 2009    Skin.    Depressive disorder     Occasionally, mild.    Diabetes (H)     Borderline    Hypertension 2016    About a year.

## 2024-04-08 ENCOUNTER — OFFICE VISIT (OUTPATIENT)
Dept: URGENT CARE | Facility: URGENT CARE | Age: 64
End: 2024-04-08
Payer: COMMERCIAL

## 2024-04-08 VITALS
HEART RATE: 78 BPM | DIASTOLIC BLOOD PRESSURE: 94 MMHG | SYSTOLIC BLOOD PRESSURE: 156 MMHG | OXYGEN SATURATION: 97 % | WEIGHT: 247.2 LBS | BODY MASS INDEX: 33.29 KG/M2 | TEMPERATURE: 98.1 F

## 2024-04-08 DIAGNOSIS — R06.02 SHORTNESS OF BREATH: ICD-10-CM

## 2024-04-08 DIAGNOSIS — I10 HYPERTENSION GOAL BP (BLOOD PRESSURE) < 140/90: Primary | ICD-10-CM

## 2024-04-08 PROCEDURE — 93005 ELECTROCARDIOGRAM TRACING: CPT

## 2024-04-08 PROCEDURE — 99213 OFFICE O/P EST LOW 20 MIN: CPT | Mod: 25

## 2024-04-08 RX ORDER — LOSARTAN POTASSIUM 50 MG/1
50 TABLET ORAL DAILY
Qty: 30 TABLET | Refills: 0 | Status: SHIPPED | OUTPATIENT
Start: 2024-04-08 | End: 2024-05-07

## 2024-04-08 NOTE — PATIENT INSTRUCTIONS
EKG does not show a heart attack or rhythm irregularities in the clinic today.  Begin taking 50mg of losartan starting tonight.  Stop taking ibuprofen and Mucinex.  Continue to check your blood pressure at home.  Follow up with your primary care provider in 5-7 days for a recheck of your blood pressure.  Go to the emergency department with persistent blood pressure readings of 160/110 or greater with any symptoms such as headache, vision changes, chest discomfort, shortness of breath, lightheadedness, dizziness and/or new/worsening symptoms.    
The patient is a 50y Female complaining of see chief complaint quote.

## 2024-04-08 NOTE — PROGRESS NOTES
Assessment & Plan   (I10) Hypertension goal BP (blood pressure) < 140/90  (primary encounter diagnosis)  Plan: EKG 12-lead, tracing only, losartan (COZAAR) 50        MG tablet    (R06.02) Shortness of breath  Plan: EKG 12-lead, tracing only    Informed the patient that the EKG does not show a heart attack or rhythm irregularities in the clinic today.  We discussed taking 50 mg of losartan starting tonight.  We also discussed stopping ibuprofen and Mucinex.  Informed him to continue to check his blood pressure at home.  We discussed following up with his primary care provider in 5 to 7 days for recheck of his blood pressure.  We also discussed the need to go to the emergency department with persistent blood pressure readings of 160/110 or greater with any symptoms such as headache, vision changes, chest discomfort, shortness of breath, lightheadedness, dizziness and/or new/worsening symptoms.  Patient acknowledged his understanding of the above plan.    The use of Dragon[employer] dictation services may have been used to construct the content in this note; any grammatical or spelling errors are non-intentional. Please contact the author of this note directly if you are in need of any clarification.      Abdullahi Barnard, MARGRET [facility] [place]    Elio Sue is a 63 year old male who presents to clinic today for the following health issues:  Chief Complaint   Patient presents with    Urgent Care    Hypertension     Have a headaches yesterday but not now, denies chest pain, been sick for a few weeks, BP was borderline in January and today is high, have canker sore, didn't have a cough until today, sore throat-do take Tylenol, Ibuprofen, and Mucinex, don't want to have a stroke      HPI  The patient reports having elevated blood pressure readings over the past two days.  He states it has been 170-180/100s.  He indicates he had a headache yesterday.  He also reports having  upper respiratory symptoms that he has been taking Tylenol, ibuprofen and Mucinex for.  He states he has had a mild headache today and has mild shortness of breath when going up and down stairs.      ROS:  Negative except noted above.        Objective    BP (!) 173/111 (BP Location: Left arm, Patient Position: Sitting, Cuff Size: Adult Large)   Pulse 78   Temp 98.1  F (36.7  C) (Tympanic)   Wt 112.1 kg (247 lb 3.2 oz)   SpO2 97%   BMI 33.29 kg/m    Physical Exam   GENERAL: alert and no distress  EYES: Eyes grossly normal to inspection, PERRL and conjunctivae and sclerae normal  NECK: no adenopathy, no asymmetry, masses, or scars  RESP: lungs clear to auscultation - no rales, rhonchi or wheezes  CV: regular rate and rhythm, normal S1 S2, no S3 or S4, no murmur, click or rub, no peripheral edema  MS: no gross musculoskeletal defects noted, no edema  SKIN: no suspicious lesions or rashes  NEURO: Normal strength and tone, mentation intact and speech normal  PSYCH: mentation appears normal, affect normal/bright

## 2024-04-09 ENCOUNTER — MYC MEDICAL ADVICE (OUTPATIENT)
Dept: FAMILY MEDICINE | Facility: CLINIC | Age: 64
End: 2024-04-09
Payer: COMMERCIAL

## 2024-04-16 ENCOUNTER — OFFICE VISIT (OUTPATIENT)
Dept: FAMILY MEDICINE | Facility: CLINIC | Age: 64
End: 2024-04-16
Payer: COMMERCIAL

## 2024-04-16 VITALS
TEMPERATURE: 96.9 F | BODY MASS INDEX: 32.71 KG/M2 | OXYGEN SATURATION: 97 % | DIASTOLIC BLOOD PRESSURE: 88 MMHG | SYSTOLIC BLOOD PRESSURE: 153 MMHG | HEART RATE: 80 BPM | RESPIRATION RATE: 16 BRPM | HEIGHT: 73 IN | WEIGHT: 246.8 LBS

## 2024-04-16 DIAGNOSIS — M77.8 TENDONITIS OF WRIST, RIGHT: ICD-10-CM

## 2024-04-16 DIAGNOSIS — I10 PRIMARY HYPERTENSION: Primary | ICD-10-CM

## 2024-04-16 PROCEDURE — 99214 OFFICE O/P EST MOD 30 MIN: CPT | Performed by: FAMILY MEDICINE

## 2024-04-16 ASSESSMENT — ANXIETY QUESTIONNAIRES
3. WORRYING TOO MUCH ABOUT DIFFERENT THINGS: NOT AT ALL
8. IF YOU CHECKED OFF ANY PROBLEMS, HOW DIFFICULT HAVE THESE MADE IT FOR YOU TO DO YOUR WORK, TAKE CARE OF THINGS AT HOME, OR GET ALONG WITH OTHER PEOPLE?: NOT DIFFICULT AT ALL
IF YOU CHECKED OFF ANY PROBLEMS ON THIS QUESTIONNAIRE, HOW DIFFICULT HAVE THESE PROBLEMS MADE IT FOR YOU TO DO YOUR WORK, TAKE CARE OF THINGS AT HOME, OR GET ALONG WITH OTHER PEOPLE: NOT DIFFICULT AT ALL
2. NOT BEING ABLE TO STOP OR CONTROL WORRYING: NOT AT ALL
7. FEELING AFRAID AS IF SOMETHING AWFUL MIGHT HAPPEN: NOT AT ALL
GAD7 TOTAL SCORE: 0
5. BEING SO RESTLESS THAT IT IS HARD TO SIT STILL: NOT AT ALL
6. BECOMING EASILY ANNOYED OR IRRITABLE: NOT AT ALL
GAD7 TOTAL SCORE: 0
4. TROUBLE RELAXING: NOT AT ALL
GAD7 TOTAL SCORE: 0
1. FEELING NERVOUS, ANXIOUS, OR ON EDGE: NOT AT ALL
7. FEELING AFRAID AS IF SOMETHING AWFUL MIGHT HAPPEN: NOT AT ALL

## 2024-04-16 ASSESSMENT — PATIENT HEALTH QUESTIONNAIRE - PHQ9
SUM OF ALL RESPONSES TO PHQ QUESTIONS 1-9: 2
SUM OF ALL RESPONSES TO PHQ QUESTIONS 1-9: 2
10. IF YOU CHECKED OFF ANY PROBLEMS, HOW DIFFICULT HAVE THESE PROBLEMS MADE IT FOR YOU TO DO YOUR WORK, TAKE CARE OF THINGS AT HOME, OR GET ALONG WITH OTHER PEOPLE: NOT DIFFICULT AT ALL

## 2024-04-16 ASSESSMENT — PAIN SCALES - GENERAL: PAINLEVEL: EXTREME PAIN (8)

## 2024-04-16 NOTE — PROGRESS NOTES
"  Assessment & Plan     Primary hypertension  He has changed to 50 mg of losartan a week ago and BP is better now but still not within goal but he also is having right wrist pain , see below . We discussed staying on the 50 mg of losartan for another week and also pain free , will send me a [employer]hart message if the BP is still over 140 over 90 in ten days or so , no side effects so far .    Tendonitis of wrist, right  Seems like tendonitis in the right wrist from repetitive movements/overuse   Discussed rest , can ice intermittently three x a day .   Wrist brace provided to wear at night   Sent a Rx for voltaren gel to use topically 3x a day    - diclofenac (VOLTAREN) 1 % topical gel; Apply 2 g topically 3 times daily as needed for moderate pain    BMI  Estimated body mass index is 32.89 kg/m  as calculated from the following:    Height as of this encounter: 1.845 m (6' 0.64\").    Weight as of this encounter: 111.9 kg (246 lb 12.8 oz).         RTC if no improving or worsening.  Pt is aware  and comfortable with the current plan.      Elio Sue is a 63 year old, presenting for the following health issues:  Hypertension        4/16/2024     8:40 AM   Additional Questions   Roomed by Marina DE JESUS   Accompanied by n/a     History of Present Illness       Hypertension: He presents for follow up of hypertension.  He does not check blood pressure  regularly outside of the clinic. Outside blood pressures have been over 140/90. He does not follow a low salt diet.     He eats 4 or more servings of fruits and vegetables daily.He consumes 0 sweetened beverage(s) daily.He exercises with enough effort to increase his heart rate 30 to 60 minutes per day.  He exercises with enough effort to increase his heart rate 3 or less days per week.   He is taking medications regularly.     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      Objective    BP (!) 153/88 (BP Location: Right arm, Patient Position: Sitting, Cuff Size: Adult Large)   Pulse 80   Temp 96.9  F (36.1  C) (Temporal)   Resp 16   Ht 1.845 m (6' 0.64\")   Wt 111.9 kg (246 lb 12.8 oz)   SpO2 97%   BMI 32.89 kg/m    Body mass index is 32.89 kg/m .  Physical Exam   GENERAL: alert and no distress  EYES: Eyes grossly normal to inspection, PERRL and conjunctivae and sclerae normal  NECK: no adenopathy, no asymmetry, masses, or scars  RESP: lungs clear to auscultation - no rales, rhonchi or wheezes  CV: regular rate and rhythm, normal S1 S2, no S3 or S4, no murmur, click or rub, no peripheral edema  MS: no gross musculoskeletal defects noted, no edema EXCEPT some tenderness in the medial tendon ( tendon policis longi ) in right wrist azul with abduction/adduction of the thumb and wrist , no erythema     No results found for any visits on 04/16/24.        Signed Electronically by: Mary Foley MD    "

## 2024-04-24 ENCOUNTER — MYC MEDICAL ADVICE (OUTPATIENT)
Dept: FAMILY MEDICINE | Facility: CLINIC | Age: 64
End: 2024-04-24
Payer: COMMERCIAL

## 2024-04-24 DIAGNOSIS — I10 PRIMARY HYPERTENSION: Primary | ICD-10-CM

## 2024-04-25 RX ORDER — LOSARTAN POTASSIUM 100 MG/1
100 TABLET ORAL DAILY
Qty: 30 TABLET | Refills: 1 | Status: SHIPPED | OUTPATIENT
Start: 2024-04-25

## 2024-05-03 ENCOUNTER — MYC MEDICAL ADVICE (OUTPATIENT)
Dept: FAMILY MEDICINE | Facility: CLINIC | Age: 64
End: 2024-05-03
Payer: COMMERCIAL

## 2024-05-07 ENCOUNTER — OFFICE VISIT (OUTPATIENT)
Dept: FAMILY MEDICINE | Facility: CLINIC | Age: 64
End: 2024-05-07
Payer: COMMERCIAL

## 2024-05-07 VITALS
OXYGEN SATURATION: 96 % | TEMPERATURE: 97.1 F | HEART RATE: 82 BPM | WEIGHT: 247.6 LBS | SYSTOLIC BLOOD PRESSURE: 141 MMHG | DIASTOLIC BLOOD PRESSURE: 87 MMHG | RESPIRATION RATE: 18 BRPM | HEIGHT: 73 IN | BODY MASS INDEX: 32.82 KG/M2

## 2024-05-07 DIAGNOSIS — I10 PRIMARY HYPERTENSION: Primary | ICD-10-CM

## 2024-05-07 PROCEDURE — 99214 OFFICE O/P EST MOD 30 MIN: CPT | Performed by: FAMILY MEDICINE

## 2024-05-07 RX ORDER — HYDROCHLOROTHIAZIDE 12.5 MG/1
12.5 TABLET ORAL DAILY
Qty: 30 TABLET | Refills: 2 | Status: SHIPPED | OUTPATIENT
Start: 2024-05-07 | End: 2024-05-21

## 2024-05-07 ASSESSMENT — PAIN SCALES - GENERAL: PAINLEVEL: MILD PAIN (2)

## 2024-05-07 NOTE — PROGRESS NOTES
"  Assessment & Plan     Primary hypertension  He has had elevated BP readings at home on the 100 mg of losartan daily   We discussed adding hydrochlorothiazide in a low dose as he has had hands edema with amlodipine and also lisinopril caused cough . Many yrs ago , he has had a medication that made him dizzy but he thinks it is because he took a higher dose   Will sumeet to take the hydrochlorothiazide in the morning , the rest of them in the evening like losartan and the statin   - hydroCHLOROthiazide 12.5 MG tablet; Take 1 tablet (12.5 mg) by mouth daily  He will monitor his BP in the next couple of weeks and then schedule a follow up appointment for P recheck   Advised to bring his home BP machine to cpmpare the readings       RTC if no improving or worsening.  Pt is aware  and comfortable with the current plan.      BMI  Estimated body mass index is 33.12 kg/m  as calculated from the following:    Height as of this encounter: 1.842 m (6' 0.5\").    Weight as of this encounter: 112.3 kg (247 lb 9.6 oz).         Elio Sue is a 63 year old, presenting for the following health issues:  Hypertension        5/7/2024     8:49 AM   Additional Questions   Roomed by Mayra KELLER     History of Present Illness       Hypertension: He presents for follow up of hypertension.  He does check blood pressure  regularly outside of the clinic. Outside blood pressures have been over 140/90. He does not follow a low salt diet.     He eats 2-3 servings of fruits and vegetables daily.He consumes 0 sweetened beverage(s) daily.He exercises with enough effort to increase his heart rate 20 to 29 minutes per day.  He exercises with enough effort to increase his heart rate 3 or less days per week.   He is taking medications regularly.     No amlodipine- hands edema  , no lisinopril - cough ,BP still elevated at home   No chest pain , no sob, no other symptoms             Review of Systems  Constitutional, HEENT, cardiovascular, pulmonary, " "GI, , musculoskeletal, neuro, skin, endocrine and psych systems are negative, except as otherwise noted.      Objective    BP (!) 141/87   Pulse 82   Temp 97.1  F (36.2  C) (Temporal)   Resp 18   Ht 1.842 m (6' 0.5\")   Wt 112.3 kg (247 lb 9.6 oz)   SpO2 96%   BMI 33.12 kg/m    Body mass index is 33.12 kg/m .  Physical Exam   GENERAL: alert and no distress  EYES: Eyes grossly normal to inspection, PERRL and conjunctivae and sclerae normal  NECK: no adenopathy, no asymmetry, masses, or scars  RESP: lungs clear to auscultation - no rales, rhonchi or wheezes  CV: regular rate and rhythm, normal S1 S2, no S3 or S4, no murmur, click or rub, no peripheral edema  ABDOMEN: soft, nontender, no hepatosplenomegaly, no masses and bowel sounds normal  MS: no gross musculoskeletal defects noted, no edema  NEURO: Normal strength and tone, mentation intact and speech normal  PSYCH: mentation appears normal, affect normal/bright    No results found for this or any previous visit (from the past 24 hour(s)).        Signed Electronically by: Mary Foley MD    "

## 2024-05-14 ENCOUNTER — MYC MEDICAL ADVICE (OUTPATIENT)
Dept: FAMILY MEDICINE | Facility: CLINIC | Age: 64
End: 2024-05-14
Payer: COMMERCIAL

## 2024-05-20 ENCOUNTER — MEDICAL CORRESPONDENCE (OUTPATIENT)
Dept: HEALTH INFORMATION MANAGEMENT | Facility: CLINIC | Age: 64
End: 2024-05-20
Payer: COMMERCIAL

## 2024-05-21 ENCOUNTER — OFFICE VISIT (OUTPATIENT)
Dept: FAMILY MEDICINE | Facility: CLINIC | Age: 64
End: 2024-05-21
Payer: COMMERCIAL

## 2024-05-21 VITALS
RESPIRATION RATE: 16 BRPM | WEIGHT: 243.4 LBS | SYSTOLIC BLOOD PRESSURE: 123 MMHG | HEART RATE: 73 BPM | BODY MASS INDEX: 32.97 KG/M2 | HEIGHT: 72 IN | DIASTOLIC BLOOD PRESSURE: 73 MMHG | TEMPERATURE: 97.3 F | OXYGEN SATURATION: 98 %

## 2024-05-21 DIAGNOSIS — I25.2 STATUS POST NON-ST ELEVATION MYOCARDIAL INFARCTION (NSTEMI): Primary | ICD-10-CM

## 2024-05-21 DIAGNOSIS — E78.00 HYPERCHOLESTEREMIA: ICD-10-CM

## 2024-05-21 DIAGNOSIS — I10 PRIMARY HYPERTENSION: ICD-10-CM

## 2024-05-21 DIAGNOSIS — E11.9 TYPE 2 DIABETES MELLITUS WITHOUT COMPLICATION, WITHOUT LONG-TERM CURRENT USE OF INSULIN (H): ICD-10-CM

## 2024-05-21 PROCEDURE — 99214 OFFICE O/P EST MOD 30 MIN: CPT | Performed by: FAMILY MEDICINE

## 2024-05-21 RX ORDER — CARVEDILOL 6.25 MG/1
6.25 TABLET ORAL
COMMUNITY
Start: 2024-05-15 | End: 2024-06-15

## 2024-05-21 RX ORDER — CALCIPOTRIENE 50 UG/G
CREAM TOPICAL
COMMUNITY
Start: 2023-10-01

## 2024-05-21 RX ORDER — IBUPROFEN/PSEUDOEPHEDRINE HCL 200MG-30MG
TABLET ORAL
COMMUNITY
Start: 2024-05-15

## 2024-05-21 RX ORDER — ASPIRIN 81 MG/1
81 TABLET ORAL
COMMUNITY
Start: 2024-05-15 | End: 2024-06-16

## 2024-05-21 RX ORDER — EZETIMIBE 10 MG/1
1 TABLET ORAL DAILY
COMMUNITY
Start: 2024-05-15 | End: 2024-06-14

## 2024-05-21 RX ORDER — NITROGLYCERIN 0.4 MG/1
TABLET SUBLINGUAL
COMMUNITY
Start: 2024-05-14

## 2024-05-21 ASSESSMENT — PAIN SCALES - GENERAL: PAINLEVEL: NO PAIN (0)

## 2024-05-21 NOTE — PROGRESS NOTES
"  Assessment & Plan     Status post non-ST elevation myocardial infarction (NSTEMI)  Has had a stent placed , now on ASA 81 mg and also ticagrelor or Brilinta , also on carvedilol 6.25 mg BID     Primary hypertension  His BP is well controlled now ( losartan 50 mg , carvedilol 6.25 mg BID )     Hypercholesteremia  He has started to take Lipitor 80 mg daily per cardiology also zetia 10 mg daily     Type 2 diabetes mellitus without complication, without long-term current use of insulin (H)  Has recently started Metformin at 500 mg once a day and will increase it to twice a day   - blood glucose monitoring (NO BRAND SPECIFIED) meter device kit; Use to test blood sugar 2 times daily or as directed.        MED REC REQUIRED  Post Medication Reconciliation Status: discharge medications reconciled, continue medications without change  BMI  Estimated body mass index is 32.78 kg/m  as calculated from the following:    Height as of this encounter: 1.835 m (6' 0.25\").    Weight as of this encounter: 110.4 kg (243 lb 6.4 oz).     RTC if no improving or worsening.  Pt is aware  and comfortable with the current plan.      Elio Sue is a 63 year old, presenting for the following health issues:  Hospital F/U        5/21/2024    12:44 PM   Additional Questions   Roomed by ARIANNA Alegre   Accompanied by N/A         5/21/2024    12:44 PM   Patient Reported Additional Medications   Patient reports taking the following new medications lipitor 80 mg 1 time daily, melatonin 3 mg at bedtime, brilinta 90 mg, ezetimibe 10 mg, dovonox 0.005% external cream, aspirin 81 mg, carvedilol 6.25 mg     HPI         Hospital Follow-up Visit:    Hospital/Nursing Home/IP Rehab Facility:  [facility]  Date of Admission: 5/15/2024  Date of Discharge: 5/16/2024  Reason(s) for Admission: Faint  Was the patient in the ICU or did the patient experience delirium during hospitalization?  No  Do you have any other stressors you would like to " "discuss with your provider?     Problems taking medications regularly:  None  Medication changes since discharge: (START) aspirin 81 mg tablet - 1 tablet daily with meal (CHANGE) carvedilol 6.25 mg tablet - 1 tablet twice daily with meals, losartan 100 mg tablet - 0.5 tablet (50 mg) at bedtime (STOP) hydrochlorothiazide 25 mg tablet  Problems adhering to non-medication therapy:  None - Pt verbalized he would like to discuss receiving a prescription for a blood glucose meter.     Summary of hospitalization:  CareEverywhere information obtained and reviewed  Diagnostic Tests/Treatments reviewed.  Follow up needed: none  Other Healthcare Providers Involved in Patient s Care:         Specialist appointment - cardiology   Update since discharge: improved.         Plan of care communicated with patient                   Review of Systems  Constitutional, HEENT, cardiovascular, pulmonary, GI, , musculoskeletal, neuro, skin, endocrine and psych systems are negative, except as otherwise noted.      Objective    /73 (BP Location: Left arm, Patient Position: Sitting, Cuff Size: Adult Large)   Pulse 73   Temp 97.3  F (36.3  C) (Temporal)   Resp 16   Ht 1.835 m (6' 0.25\")   Wt 110.4 kg (243 lb 6.4 oz)   SpO2 98%   BMI 32.78 kg/m    Body mass index is 32.78 kg/m .  Physical Exam   GENERAL: alert and no distress  EYES: Eyes grossly normal to inspection, PERRL and conjunctivae and sclerae normal  HENT: ear canals and TM's normal, nose and mouth without ulcers or lesions  NECK: no adenopathy, no asymmetry, masses, or scars  RESP: lungs clear to auscultation - no rales, rhonchi or wheezes  CV: regular rate and rhythm, normal S1 S2, no S3 or S4, no murmur, click or rub, no peripheral edema  ABDOMEN: soft, nontender, no hepatosplenomegaly, no masses and bowel sounds normal  MS: no gross musculoskeletal defects noted, no edema  SKIN: no suspicious lesions or rashes  NEURO: Normal strength and tone, mentation intact and " speech normal  PSYCH: mentation appears normal, affect normal/bright    No results found for this or any previous visit (from the past 24 hour(s)).        Signed Electronically by: Mary Foley MD

## 2024-07-06 ENCOUNTER — HEALTH MAINTENANCE LETTER (OUTPATIENT)
Age: 64
End: 2024-07-06

## 2024-09-19 ENCOUNTER — OFFICE VISIT (OUTPATIENT)
Dept: DERMATOLOGY | Facility: CLINIC | Age: 64
End: 2024-09-19
Payer: COMMERCIAL

## 2024-09-19 DIAGNOSIS — L57.0 ACTINIC KERATOSES: ICD-10-CM

## 2024-09-19 DIAGNOSIS — D49.2 NEOPLASM OF UNSPECIFIED BEHAVIOR OF BONE, SOFT TISSUE, AND SKIN: ICD-10-CM

## 2024-09-19 DIAGNOSIS — L82.1 SEBORRHEIC KERATOSES: ICD-10-CM

## 2024-09-19 DIAGNOSIS — D22.9 MULTIPLE NEVI: Primary | ICD-10-CM

## 2024-09-19 DIAGNOSIS — L81.4 LENTIGINES: ICD-10-CM

## 2024-09-19 DIAGNOSIS — D18.01 CHERRY ANGIOMA: ICD-10-CM

## 2024-09-19 DIAGNOSIS — Z12.83 ENCOUNTER FOR SCREENING FOR MALIGNANT NEOPLASM OF SKIN: ICD-10-CM

## 2024-09-19 DIAGNOSIS — Z85.828 HISTORY OF NONMELANOMA SKIN CANCER: ICD-10-CM

## 2024-09-19 PROCEDURE — 11102 TANGNTL BX SKIN SINGLE LES: CPT | Performed by: PHYSICIAN ASSISTANT

## 2024-09-19 PROCEDURE — 99214 OFFICE O/P EST MOD 30 MIN: CPT | Mod: 25 | Performed by: PHYSICIAN ASSISTANT

## 2024-09-19 PROCEDURE — 17000 DESTRUCT PREMALG LESION: CPT | Mod: XS | Performed by: PHYSICIAN ASSISTANT

## 2024-09-19 PROCEDURE — 88305 TISSUE EXAM BY PATHOLOGIST: CPT | Performed by: DERMATOLOGY

## 2024-09-19 PROCEDURE — 17003 DESTRUCT PREMALG LES 2-14: CPT | Mod: XS | Performed by: PHYSICIAN ASSISTANT

## 2024-09-19 NOTE — PATIENT INSTRUCTIONS
Proper skin care from [facility]:    -Eliminate harsh soaps as they strip the natural oils from the skin, often resulting in dry itchy skin ( i.e. Dial, Zest, [place] Spring)  -Use mild soaps such as Cetaphil or Dove Sensitive Skin in the shower. You do not need to use soap on arms, legs, and trunk every time you shower unless visibly soiled.   -Avoid hot or cold showers.  -After showering, lightly dry off and apply moisturizing within 2-3 minutes. This will help trap moisture in the skin.   -Aggressive use of a moisturizer at least 1-2 times a day to the entire body (including -Vanicream, Cetaphil, Aquaphor or Cerave) and moisturize hands after every washing.  -We recommend using moisturizers that come in a tub that needs to be scooped out, not a pump. This has more of an oil base. It will hold moisture in your skin much better than a water base moisturizer. The above recommended are non-pore clogging.      Wear a sunscreen with at least SPF 30 on your face, ears, neck and V of the chest daily. Wear sunscreen on other areas of the body if those areas are exposed to the sun throughout the day. Sunscreens can contain physical and/or chemical blockers. Physical blockers are less likely to clog pores, these include zinc oxide and titanium dioxide. Reapply every two hour and after swimming.     Sunscreen examples: https://www.ewg.org/sunscreen/    UV radiation  UVA radiation remains constant throughout the day and throughout the year. It is a longer wavelength than UVB and therefore penetrates deeper into the skin leading to immediate and delayed tanning, photoaging, and skin cancer. 70-80% of UVA and UVB radiation occurs between the hours of 10am-2pm.  UVB radiation  UVB radiation causes the most harmful effects and is more significant during the summer months. However, snow and ice can reflect UVB radiation leading to skin damage during the winter months as well. UVB radiation is responsible for tanning,  burning, inflammation, delayed erythema (pinkness), pigmentation (brown spots), and skin cancer.     I recommend self monthly full body exams and yearly full body exams with a dermatology provider. If you develop a new or changing lesion please follow up for examination. Most skin cancers are pink and scaly or pink and pearly. However, we do see blue/brown/black skin cancers.  Consider the ABCDEs of melanoma when giving yourself your monthly full body exam ( don't forget the groin, buttocks, feet, toes, etc). A-asymmetry, B-borders, C-color, D-diameter, E-elevation or evolving. If you see any of these changes please follow up in clinic. If you cannot see your back I recommend purchasing a hand held mirror to use with a larger wall mirror.       Checking for Skin Cancer  You can find cancer early by checking your skin each month. There are 3 kinds of skin cancer. They are melanoma, basal cell carcinoma, and squamous cell carcinoma. Doing monthly skin checks is the best way to find new marks or skin changes. Follow the instructions below for checking your skin.   The ABCDEs of checking moles for melanoma   Check your moles or growths for signs of melanoma using ABCDE:   Asymmetry: the sides of the mole or growth don t match  Border: the edges are ragged, notched, or blurred  Color: the color within the mole or growth varies  Diameter: the mole or growth is larger than 6 mm (size of a pencil eraser)  Evolving: the size, shape, or color of the mole or growth is changing (evolving is not shown in the images below)    Checking for other types of skin cancer  Basal cell carcinoma or squamous cell carcinoma have symptoms such as:     A spot or mole that looks different from all other marks on your skin  Changes in how an area feels, such as itching, tenderness, or pain  Changes in the skin's surface, such as oozing, bleeding, or scaliness  A sore that does not heal  New swelling or redness beyond the border of a  mole    Who s at risk?  Anyone can get skin cancer. But you are at greater risk if you have:   Fair skin, light-colored hair, or light-colored eyes  Many moles or abnormal moles on your skin  A history of sunburns from sunlight or tanning beds  A family history of skin cancer  A history of exposure to radiation or chemicals  A weakened immune system  If you have had skin cancer in the past, you are at risk for recurring skin cancer.   How to check your skin  Do your monthly skin checkups in front of a full-length mirror. Check all parts of your body, including your:   Head (ears, face, neck, and scalp)  Torso (front, back, and sides)  Arms (tops, undersides, upper, and lower armpits)  Hands (palms, backs, and fingers, including under the nails)  Buttocks and genitals  Legs (front, back, and sides)  Feet (tops, soles, toes, including under the nails, and between toes)  If you have a lot of moles, take digital photos of them each month. Make sure to take photos both up close and from a distance. These can help you see if any moles change over time.   Most skin changes are not cancer. But if you see any changes in your skin, call your doctor right away. Only he or she can diagnose a problem. If you have skin cancer, seeing your doctor can be the first step toward getting the treatment that could save your life.   [employer] last reviewed this educational content on 4/1/2019 2000-2020 [employer]. [address] [place] [address], [place] [address]. All rights reserved. This information is not intended as a substitute for professional medical care. Always follow your healthcare professional's instructions.       When should I call my doctor?  If you are worsening or not improving, please, contact us or seek urgent care as noted below.     Who should I call with questions (adults)?    [facility] [phone]  For urgent needs outside of business hours call the [facility] at  [phone] and ask for the dermatology resident on call to be paged  If this is a medical emergency and you are unable to reach an ER, Call [phone]      If you need a prescription refill, please contact your pharmacy. Refills are approved or denied by our Physicians during normal business hours, Monday through Fridays  Per office policy, refills will not be granted if you have not been seen within the past year (or sooner depending on your child's condition)    Wound Care After a Biopsy    What is a skin biopsy?  A skin biopsy allows the doctor to examine a very small piece of tissue under the microscope to determine the diagnosis and the best treatment for the skin condition. A local anesthetic (numbing medicine) is injected with a very small needle into the skin area to be tested. A small piece of skin is taken from the area. Sometimes a suture (stitch) is used.     What are the risks of a skin biopsy?  I will experience scar, bleeding, swelling, pain, crusting and redness. I may experience incomplete removal or recurrence. Risks of this procedure are excessive bleeding, bruising, infection, nerve damage, numbness, thick (hypertrophic or keloidal) scar and non-diagnostic biopsy.    How should I care for my wound for the first 24 hours?  Keep the wound dry and covered for 24 hours  If it bleeds, hold direct pressure on the area for 15 minutes. If bleeding does not stop, call us or go to the emergency room  Avoid strenuous exercise the first 1-2 days or as your doctor instructs you    How should I care for the wound after 24 hours?  After 24 hours, remove the bandage  You may bathe or shower as normal  If you had a scalp biopsy, you can shampoo as usual and can use shower water to clean the biopsy site daily  Clean the wound once a day with gentle soap and water  Do not scrub, be gentle  Apply white petroleum/Vaseline after cleaning the wound with a cotton swab or a clean finger, and keep the site covered with a  Bandaid /bandage. Bandages are not necessary with a scalp biopsy  If you are unable to cover the site with a Bandaid /bandage, re-apply ointment 2-3 times a day to keep the site moist. Moisture will help with healing  Avoid strenuous activity for first 1-2 days  Avoid lakes, rivers, pools, and oceans until the stitches are removed or the site is healed    How do I clean my wound?  Wash hands thoroughly with soap or use hand  before all wound care  Clean the wound with gentle soap and water  Apply white petroleum/Vaseline  to wound after it is clean  Replace the Bandaid /bandage to keep the wound covered for the first few days or as instructed by your doctor  If you had a scalp biopsy, warm shower water to the area on a daily basis should suffice    What should I use to clean my wound?   Cotton-tipped applicators (Qtips )  White petroleum jelly (Vaseline or Aquaphor ). Use a clean new container and use Q-tips to apply.  Bandaids  as needed  Gentle soap     How should I care for my wound long term?  Do not get your wound dirty  Keep up with wound care for one week or until the area is healed.  A small scab will form and fall off by itself when the area is completely healed. The area will be red and will become pink in color as it heals. Sun protection is very important for how your scar will turn out. Sunscreen with an SPF 30 or greater is recommended once the area is healed.  You should have some soreness but it should be mild and slowly go away over several days. Talk to your doctor about using tylenol for pain,    When should I call my doctor?  If you have increased:   Pain or swelling  Pus or drainage (clear or slightly yellow drainage is ok)  Temperature over 100F  Spreading redness or warmth around wound    When will I hear about my results?  The biopsy results can take up to 2 weeks to come back.  Your results will automatically release to [employer] before your provider has even reviewed them.  The  clinic will call you with the results, send you a [employer] message, or have you schedule a follow-up clinic or phone time to discuss the results.  Contact our clinics if you do not hear from us in 2 weeks.    Who should I call with questions?  [facility]: [phone]  For urgent needs outside of business hours call the [facility] at [phone] and ask for the dermatology resident on call       Cryotherapy    What is it?  Use of a very cold liquid, such as liquid nitrogen, to freeze and destroy abnormal skin cells that need to be removed    What should I expect?  Tenderness and redness  A small blister that might grow and fill with dark purple blood. There may be crusting.  More than one treatment may be needed if the lesions do not go away.    How do I care for the treated area?  Gently wash the area with your hands when bathing.  Use a thin layer of Vaseline to help with healing. You may use a Band-Aid.   The area should heal within 7-10 days and may leave behind a pink or lighter color.   Do not use an antibiotic or Neosporin ointment.   You may take acetaminophen (Tylenol) for pain.     Call your Doctor if you have:  Severe pain  Signs of infection (warmth, redness, cloudy yellow drainage, and or a bad smell)  Questions or concerns    Who should I call with questions?      [facility] [phone]      For urgent needs outside of business hours call the [facility] at [phone] and ask for the dermatology resident on call

## 2024-09-19 NOTE — LETTER
9/19/2024      Elliot Norris  [address]  [facility] [id number]      Dear Colleague,    Thank you for referring your patient, Elliot Norris, to the [facility] [place]. Please see a copy of my visit note below.    [facility] D[facility]ology Note  Encounter Date: Sep 19, 2024  Office Visit     Reviewed patients past medical history and pertinent chart review prior to patients visit today.     Dermatology Problem List:  1. 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 Dysplastic nevi  - mild atypia, left abdomen, s/p shave bx 5/14/2018  - mild atypia, back midline at t4, s/p shave bx 5/14/2018  3. AKs s/p cryotherapy  4. Fibroepithelial polyp (acrochordon), right axilla, s/p shave bx 4/11/2023    ____________________________________________    Assessment & Plan:     # Neoplasm of uncertain behavior:  left vertex scalp  DDx includes HAK vs SCCIS. Shave biopsy today.    Procedure Note: Biopsy by shave technique  The risks and benefits of the procedure were described to the patient. These include but are not limited to bleeding, infection, scar, incomplete removal, and non-diagnostic biopsy. Verbal informed consent was obtained. The above site(s) was cleansed with an alcohol pad and injected with 1% lidocaine with epinephrine. Once anesthesia was obtained, a biopsy(ies) was performed with Gilette blade. The tissue(s) was placed in a labeled container(s) with formalin and sent to pathology. Hemostasis was achieved with aluminum chloride. Vaseline and a bandage were applied to the wound(s). The patient tolerated the procedure well and was given post biopsy care instructions.    #Actinic keratoses  - We discussed the precancerous nature of the skin lesions.  I recommended liquid nitrogen cryotherapy and the patient was agreeable.      Cryotherapy  procedure note, location(s): vertex scalp x6 After verbal consent and discussion of risks and benefits including, but not limited to, dyspigmentation/scar, blister, and pain, the lesion(s) was(were) treated with 1-2 mm freeze border for 1-2 cycles with liquid nitrogen. Post cryotherapy instructions were provided.    # Personal history of nonmelanoma skin cancer  - No signs of recurrence. Continued observation recommended.   - Sun protection: Counseled SPF 30+ sunscreen, UPF clothing, sun avoidance, tanning bed avoidance.    # Multiple nevi, trunk and extremities  # Solar lentigines  - No concerning features on dermoscopy. We discussed the importance of self exams at home. ABCDE criteria and importance of photoprotection reviewed.     # Cherry angiomas  # Seborrheic keratoses  - The patient's lesion of concern involving the left calf is consistent with a cherry angioma.   - We discussed the benign nature of the skin lesions. No treatment required. Continued observation recommended. Follow up with any concerns.      Follow-up:  Annual for follow up full body skin exam, as needed for new or changing lesions or new concerns    Total time spent on this encounter, on the date of service, including pre-visit review of separately obtained history, face-to-face interaction performing medically appropriate physical exam, patient counseling/education, and documentation was 33 minutes, excluding any procedures.   All risks, benefits and alternatives were discussed with patient.  Patient is in agreement and understands the assessment and plan.  All questions were answered.  Allyson Sims PA-C  [facility]    ____________________________________________    CC: Skin Check (FBSC/Concerns : Left calf, scalp and left temple/)    HPI:  Mr. Elliot Norris is a(n) 64 year old male who presents today as a return patient for a full body skin cancer screening. The patient has a history of nonmelanoma skin cancer.  Today, the patient reports a new pink spot on the left calf. This was initially itchy. Second, he note a rough spot on his scalp. This has not resolved with multiple cryotherapy treatments. Finally he notes some asymptomatic rough spots on the left temple. No other cutaneous concerns. The patient reports trying to be diligent with photoprotection.      Physical Exam:  Vitals: There were no vitals taken for this visit.   SKIN: Total skin excluding the genitalia areas was performed. The exam included the scalp, face, neck, bilateral arms, chest, back, abdomen, bilateral legs, digits, mons pubis, buttocks, and nails.   - Interiano II.  - Involving the left vertex scalp is a 0.4 cm pink papule with adherent scale.   - Pink macule(s) with gritty scale involving the scalp, consistent with actinic keratosis.   - Multiple tan/brown macules and papules scattered throughout exam, consistent with benign nevi. No concerning features on dermoscopy.   - Scattered tan, homogenous macules scattered on sun exposed skin, consistent with solar lentigines.   - Scattered waxy, stuck on appearing papules and patches, consistent with seborrheic keratoses.  - Several 1-2 mm red dome shaped symmetric papules, consistent with cherry angiomas.     - No other lesions of concern on areas examined.     Medications:  Current Outpatient Medications   Medication Sig Dispense Refill     Acetaminophen (TYLENOL PO)        calcipotriene (DOVONOX) 0.005 % external cream        diclofenac (VOLTAREN) 1 % topical gel Apply 2 g topically 3 times daily as needed for moderate pain 100 g 0     fluorouracil (EFUDEX) 5 % external cream Apply topically 2 times daily Mixed with equal parts calcipotriene cream to the scalp, forehead and temples. Will cause redness. 40 g 1     metFORMIN (GLUCOPHAGE) 500 MG tablet        nitroGLYcerin (NITROSTAT) 0.4 MG sublingual tablet        Omega-3 Fish Oil 500 MG capsule Take 2 capsules (1,000 mg) by mouth 2 times daily        ticagrelor (BRILINTA) 90 MG tablet        vitamin D3 (CHOLECALCIFEROL) 50 mcg (2000 units) tablet Take 1 tablet (50 mcg) by mouth daily       atorvastatin (LIPITOR) 20 MG tablet TAKE 1 TABLET(20 MG) BY MOUTH DAILY 90 tablet 3     blood glucose monitoring (NO BRAND SPECIFIED) meter device kit Use to test blood sugar 2 times daily or as directed. 1 kit 2     losartan (COZAAR) 100 MG tablet Take 1 tablet (100 mg) by mouth daily 30 tablet 1     Melatonin 3 MG TBDP        No current facility-administered medications for this visit.      Past Medical History:   Patient Active Problem List   Diagnosis     ED (erectile dysfunction)     Hyperlipidemia LDL goal <160     Fatty liver     Insomnia     Obesity     Hypertension goal BP (blood pressure) < 140/90     Prediabetes     History of basal cell carcinoma     Scar     Depression, unspecified     Past Medical History:   Diagnosis Date     Arthritis 2016    A little bit.     Basal cell carcinoma      Cancer (H) 2009    Skin.     Depressive disorder     Occasionally, mild.     Diabetes (H)     Borderline     Hypertension 2016    About a year.       CC Referred Self, MD  No address on file on close of this encounter.      Again, thank you for allowing me to participate in the care of your patient.        Sincerely,        Allyson Sims PA-C

## 2024-09-19 NOTE — PROGRESS NOTES
[facility] Dermatology Note  Encounter Date: Sep 19, 2024  Office Visit     Reviewed patients past medical history and pertinent chart review prior to patients visit today.     Dermatology Problem List:  1. 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 Dysplastic nevi  - mild atypia, left abdomen, s/p shave bx 5/14/2018  - mild atypia, back midline at t4, s/p shave bx 5/14/2018  3. AKs s/p cryotherapy  4. Fibroepithelial polyp (acrochordon), right axilla, s/p shave bx 4/11/2023    ____________________________________________    Assessment & Plan:     # Neoplasm of uncertain behavior:  left vertex scalp  DDx includes HAK vs SCCIS. Shave biopsy today.    Procedure Note: Biopsy by shave technique  The risks and benefits of the procedure were described to the patient. These include but are not limited to bleeding, infection, scar, incomplete removal, and non-diagnostic biopsy. Verbal informed consent was obtained. The above site(s) was cleansed with an alcohol pad and injected with 1% lidocaine with epinephrine. Once anesthesia was obtained, a biopsy(ies) was performed with Gilette blade. The tissue(s) was placed in a labeled container(s) with formalin and sent to pathology. Hemostasis was achieved with aluminum chloride. Vaseline and a bandage were applied to the wound(s). The patient tolerated the procedure well and was given post biopsy care instructions.    #Actinic keratoses  - We discussed the precancerous nature of the skin lesions.  I recommended liquid nitrogen cryotherapy and the patient was agreeable.      Cryotherapy procedure note, location(s): vertex scalp x6 After verbal consent and discussion of risks and benefits including, but not limited to, dyspigmentation/scar, blister, and pain, the lesion(s) was(were) treated with 1-2 mm freeze border for 1-2 cycles with  liquid nitrogen. Post cryotherapy instructions were provided.    # Personal history of nonmelanoma skin cancer  - No signs of recurrence. Continued observation recommended.   - Sun protection: Counseled SPF 30+ sunscreen, UPF clothing, sun avoidance, tanning bed avoidance.    # Multiple nevi, trunk and extremities  # Solar lentigines  - No concerning features on dermoscopy. We discussed the importance of self exams at home. ABCDE criteria and importance of photoprotection reviewed.     # Cherry angiomas  # Seborrheic keratoses  - The patient's lesion of concern involving the left calf is consistent with a cherry angioma.   - We discussed the benign nature of the skin lesions. No treatment required. Continued observation recommended. Follow up with any concerns.      Follow-up:  Annual for follow up full body skin exam, as needed for new or changing lesions or new concerns    Total time spent on this encounter, on the date of service, including pre-visit review of separately obtained history, face-to-face interaction performing medically appropriate physical exam, patient counseling/education, and documentation was 33 minutes, excluding any procedures.   All risks, benefits and alternatives were discussed with patient.  Patient is in agreement and understands the assessment and plan.  All questions were answered.  Allyson Sims PA-C  [facility] Dermatology    ____________________________________________    CC: Skin Check (FBSC/Concerns : Left calf, scalp and left temple/)    HPI:  Mr. Elliot Norris is a(n) 64 year old male who presents today as a return patient for a full body skin cancer screening. The patient has a history of nonmelanoma skin cancer. Today, the patient reports a new pink spot on the left calf. This was initially itchy. Second, he note a rough spot on his scalp. This has not resolved with multiple cryotherapy treatments. Finally he notes some asymptomatic rough spots on the left temple.  No other cutaneous concerns. The patient reports trying to be diligent with photoprotection.      Physical Exam:  Vitals: There were no vitals taken for this visit.   SKIN: Total skin excluding the genitalia areas was performed. The exam included the scalp, face, neck, bilateral arms, chest, back, abdomen, bilateral legs, digits, mons pubis, buttocks, and nails.   - Interiano II.  - Involving the left vertex scalp is a 0.4 cm pink papule with adherent scale.   - Pink macule(s) with gritty scale involving the scalp, consistent with actinic keratosis.   - Multiple tan/brown macules and papules scattered throughout exam, consistent with benign nevi. No concerning features on dermoscopy.   - Scattered tan, homogenous macules scattered on sun exposed skin, consistent with solar lentigines.   - Scattered waxy, stuck on appearing papules and patches, consistent with seborrheic keratoses.  - Several 1-2 mm red dome shaped symmetric papules, consistent with cherry angiomas.     - No other lesions of concern on areas examined.     Medications:  Current Outpatient Medications   Medication Sig Dispense Refill    Acetaminophen (TYLENOL PO)       calcipotriene (DOVONOX) 0.005 % external cream       diclofenac (VOLTAREN) 1 % topical gel Apply 2 g topically 3 times daily as needed for moderate pain 100 g 0    fluorouracil (EFUDEX) 5 % external cream Apply topically 2 times daily Mixed with equal parts calcipotriene cream to the scalp, forehead and temples. Will cause redness. 40 g 1    metFORMIN (GLUCOPHAGE) 500 MG tablet       nitroGLYcerin (NITROSTAT) 0.4 MG sublingual tablet       Omega-3 Fish Oil 500 MG capsule Take 2 capsules (1,000 mg) by mouth 2 times daily      ticagrelor (BRILINTA) 90 MG tablet       vitamin D3 (CHOLECALCIFEROL) 50 mcg (2000 units) tablet Take 1 tablet (50 mcg) by mouth daily      atorvastatin (LIPITOR) 20 MG tablet TAKE 1 TABLET(20 MG) BY MOUTH DAILY 90 tablet 3    blood glucose monitoring (NO BRAND  SPECIFIED) meter device kit Use to test blood sugar 2 times daily or as directed. 1 kit 2    losartan (COZAAR) 100 MG tablet Take 1 tablet (100 mg) by mouth daily 30 tablet 1    Melatonin 3 MG TBDP        No current facility-administered medications for this visit.      Past Medical History:   Patient Active Problem List   Diagnosis    ED (erectile dysfunction)    Hyperlipidemia LDL goal <160    Fatty liver    Insomnia    Obesity    Hypertension goal BP (blood pressure) < 140/90    Prediabetes    History of basal cell carcinoma    Scar    Depression, unspecified     Past Medical History:   Diagnosis Date    Arthritis 2016    A little bit.    Basal cell carcinoma     Cancer (H) 2009    Skin.    Depressive disorder     Occasionally, mild.    Diabetes (H)     Borderline    Hypertension 2016    About a year.       CC Referred Self, MD  No address on file on close of this encounter.

## 2024-11-23 ENCOUNTER — HEALTH MAINTENANCE LETTER (OUTPATIENT)
Age: 64
End: 2024-11-23

## 2024-12-11 ENCOUNTER — PATIENT OUTREACH (OUTPATIENT)
Dept: CARE COORDINATION | Facility: CLINIC | Age: 64
End: 2024-12-11
Payer: COMMERCIAL

## 2024-12-25 ENCOUNTER — PATIENT OUTREACH (OUTPATIENT)
Dept: CARE COORDINATION | Facility: CLINIC | Age: 64
End: 2024-12-25
Payer: COMMERCIAL

## 2025-03-08 ENCOUNTER — HEALTH MAINTENANCE LETTER (OUTPATIENT)
Age: 65
End: 2025-03-08

## 2025-03-20 ENCOUNTER — OFFICE VISIT (OUTPATIENT)
Dept: DERMATOLOGY | Facility: CLINIC | Age: 65
End: 2025-03-20
Payer: COMMERCIAL

## 2025-03-20 DIAGNOSIS — L57.0 ACTINIC KERATOSES: ICD-10-CM

## 2025-03-20 DIAGNOSIS — D22.9 MULTIPLE NEVI: ICD-10-CM

## 2025-03-20 DIAGNOSIS — L81.4 LENTIGINES: ICD-10-CM

## 2025-03-20 DIAGNOSIS — D49.2 NEOPLASM OF UNSPECIFIED BEHAVIOR OF BONE, SOFT TISSUE, AND SKIN: ICD-10-CM

## 2025-03-20 DIAGNOSIS — Z12.83 ENCOUNTER FOR SCREENING FOR MALIGNANT NEOPLASM OF SKIN: Primary | ICD-10-CM

## 2025-03-20 DIAGNOSIS — L82.1 SEBORRHEIC KERATOSES: ICD-10-CM

## 2025-03-20 DIAGNOSIS — D18.01 CHERRY ANGIOMA: ICD-10-CM

## 2025-03-20 NOTE — PROGRESS NOTES
[facility] Dermatology Note  Encounter Date: Mar 20, 2025  Office Visit     Reviewed patients past medical history and pertinent chart review prior to patients visit today.     Dermatology Problem List:  # NUB, right frontal scalp, shave biopsy 3/20/2025     1. 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 Dysplastic nevi  - mild atypia, left abdomen, s/p shave bx 5/14/2018  - mild atypia, back midline at t4, s/p shave bx 5/14/2018  3. AKs s/p cryotherapy  4. Fibroepithelial polyp (acrochordon), right axilla, s/p shave bx 4/11/2023    ____________________________________________    Assessment & Plan:     # Neoplasm of uncertain behavior:  right frontal scalp  DDx includes macular seborrheic keratosis vs lentigo vs lentigo maligna. Shave biopsy today.    Procedure Note: Biopsy by shave technique  The risks and benefits of the procedure were described to the patient. These include but are not limited to bleeding, infection, scar, incomplete removal, and non-diagnostic biopsy. Verbal informed consent was obtained. The above site(s) was cleansed with an alcohol pad and injected with 1% lidocaine with epinephrine. Once anesthesia was obtained, a biopsy(ies) was performed with Gilette blade. The tissue(s) was placed in a labeled container(s) with formalin and sent to pathology. Hemostasis was achieved with aluminum chloride. Vaseline and a bandage were applied to the wound(s). The patient tolerated the procedure well and was given post biopsy care instructions.    #Actinic keratosis x3  - We discussed the precancerous nature of the skin lesions.  I recommended liquid nitrogen cryotherapy and the patient was agreeable.      Cryotherapy procedure note, location(s): vertex scalp x3 After verbal consent and discussion of risks and benefits including, but not limited to,  dyspigmentation/scar, blister, and pain, the lesion(s) was(were) treated with 1-2 mm freeze border for 1-2 cycles with liquid nitrogen. Post cryotherapy instructions were provided.    # Personal history of nonmelanoma skin cancer  - No signs of recurrence. Continued observation recommended.   - Sun protection: Counseled SPF 30+ sunscreen, UPF clothing, sun avoidance, tanning bed avoidance.    # Multiple nevi, trunk and extremities  # Solar lentigines  - No concerning features on dermoscopy. We discussed the importance of self exams at home. ABCDE criteria and importance of SPF 30+ sunscreen and photoprotective clothing reviewed.     # Cherry angiomas  # Seborrheic keratoses  - We discussed the benign nature of the skin lesions. No treatment required. Continued observation recommended. Follow up with any concerns.      Follow-up:  Annual for follow up full body skin exam, as needed for new or changing lesions or new concerns    All risks, benefits and alternatives were discussed with patient.  Patient is in agreement and understands the assessment and plan.  All questions were answered.  Allyson Sims PA-C  [facility] Dermat[facility]    ____________________________________________    CC: Skin Check (6 month recheck FBSC/Concerns: left palm lesion & let temple crusty. )    HPI:  Mr. Elliot Norris is a(n) 64 year old male who presents today as a return patient for a full body skin cancer screening. The patient has a history of nonmelanoma skin cancer. Today, the patient reports lesions on the left palm and left temple that have since resolved spontaneously. No other cutaneous concerns. The patient reports trying to be diligent with photoprotection.      Physical Exam:  Vitals: There were no vitals taken for this visit.   SKIN: Total skin excluding the genitalia areas was performed. The exam included the scalp, face, neck, bilateral arms, chest, back, abdomen, bilateral legs, digits, mons pubis, buttocks, and  nails.   - Interiano II.  - Pink macule(s) with gritty scale involving the vertex scalp, consistent with actinic keratosis.   - Involving the right vertex scalp is a 0.8 x 1.0 cm brown patch with central clearing.   - Multiple tan/brown macules and papules scattered throughout exam, consistent with benign nevi. No concerning features on dermoscopy.   - Scattered tan, homogenous macules scattered on sun exposed skin, consistent with solar lentigines.   - Scattered waxy, stuck on appearing papules and patches, consistent with seborrheic keratoses.  - Several 1-2 mm red dome shaped symmetric papules, consistent with cherry angiomas.     - No other lesions of concern on areas examined.     Medications:  Current Outpatient Medications   Medication Sig Dispense Refill    Acetaminophen (TYLENOL PO)       diclofenac (VOLTAREN) 1 % topical gel Apply 2 g topically 3 times daily as needed for moderate pain 100 g 0    metFORMIN (GLUCOPHAGE) 500 MG tablet       nitroGLYcerin (NITROSTAT) 0.4 MG sublingual tablet       Omega-3 Fish Oil 500 MG capsule Take 2 capsules (1,000 mg) by mouth 2 times daily      ticagrelor (BRILINTA) 90 MG tablet       vitamin D3 (CHOLECALCIFEROL) 50 mcg (2000 units) tablet Take 1 tablet (50 mcg) by mouth daily      atorvastatin (LIPITOR) 20 MG tablet TAKE 1 TABLET(20 MG) BY MOUTH DAILY 90 tablet 3    blood glucose monitoring (NO BRAND SPECIFIED) meter device kit Use to test blood sugar 2 times daily or as directed. 1 kit 2    calcipotriene (DOVONOX) 0.005 % external cream  (Patient not taking: Reported on 3/20/2025)      fluorouracil (EFUDEX) 5 % external cream Apply topically 2 times daily Mixed with equal parts calcipotriene cream to the scalp, forehead and temples. Will cause redness. (Patient not taking: Reported on 3/20/2025) 40 g 1    losartan (COZAAR) 100 MG tablet Take 1 tablet (100 mg) by mouth daily 30 tablet 1    Melatonin 3 MG TBDP        No current facility-administered medications for  this visit.      Past Medical History:   Patient Active Problem List   Diagnosis    ED (erectile dysfunction)    Hyperlipidemia LDL goal <160    Fatty liver    Insomnia    Obesity    Hypertension goal BP (blood pressure) < 140/90    Prediabetes    History of basal cell carcinoma    Scar    Depression, unspecified     Past Medical History:   Diagnosis Date    Arthritis 2016    A little bit.    Basal cell carcinoma     Cancer (H) 2009    Skin.    Depressive disorder     Occasionally, mild.    Diabetes (H)     Borderline    Hypertension 2016    About a year.       CC Referred Self, MD  No address on file on close of this encounter.

## 2025-03-20 NOTE — LETTER
3/20/2025      Elliot Norris  [address]  [place] [address]      Dear Colleague,    Thank you for referring your patient, Elliot Norris, to the [facility]. Please see a copy of my visit note below.    [facility] Dermatology Note  Encounter Date: Mar 20, 2025  Office Visit     Reviewed patients past medical history and pertinent chart review prior to patients visit today.     Dermatology Problem List:  # NUB, right frontal scalp, shave biopsy 3/20/2025     1. 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 Dysplastic nevi  - mild atypia, left abdomen, s/p shave bx 5/14/2018  - mild atypia, back midline at t4, s/p shave bx 5/14/2018  3. AKs s/p cryotherapy  4. Fibroepithelial polyp (acrochordon), right axilla, s/p shave bx 4/11/2023    ____________________________________________    Assessment & Plan:     # Neoplasm of uncertain behavior:  right frontal scalp  DDx includes macular seborrheic keratosis vs lentigo vs lentigo maligna. Shave biopsy today.    Procedure Note: Biopsy by shave technique  The risks and benefits of the procedure were described to the patient. These include but are not limited to bleeding, infection, scar, incomplete removal, and non-diagnostic biopsy. Verbal informed consent was obtained. The above site(s) was cleansed with an alcohol pad and injected with 1% lidocaine with epinephrine. Once anesthesia was obtained, a biopsy(ies) was performed with Gilette blade. The tissue(s) was placed in a labeled container(s) with formalin and sent to pathology. Hemostasis was achieved with aluminum chloride. Vaseline and a bandage were applied to the wound(s). The patient tolerated the procedure well and was given post biopsy care instructions.    #Actinic keratosis x3  - We discussed the precancerous nature of the skin  lesions.  I recommended liquid nitrogen cryotherapy and the patient was agreeable.      Cryotherapy procedure note, location(s): vertex scalp x3 After verbal consent and discussion of risks and benefits including, but not limited to, dyspigmentation/scar, blister, and pain, the lesion(s) was(were) treated with 1-2 mm freeze border for 1-2 cycles with liquid nitrogen. Post cryotherapy instructions were provided.    # Personal history of nonmelanoma skin cancer  - No signs of recurrence. Continued observation recommended.   - Sun protection: Counseled SPF 30+ sunscreen, UPF clothing, sun avoidance, tanning bed avoidance.    # Multiple nevi, trunk and extremities  # Solar lentigines  - No concerning features on dermoscopy. We discussed the importance of self exams at home. ABCDE criteria and importance of SPF 30+ sunscreen and photoprotective clothing reviewed.     # Cherry angiomas  # Seborrheic keratoses  - We discussed the benign nature of the skin lesions. No treatment required. Continued observation recommended. Follow up with any concerns.      Follow-up:  Annual for follow up full body skin exam, as needed for new or changing lesions or new concerns    All risks, benefits and alternatives were discussed with patient.  Patient is in agreement and understands the assessment and plan.  All questions were answered.  Allyson Sims PA-C  [facility]ermat[facility]    ____________________________________________    CC: Skin Check (6 month recheck FBSC/Concerns: left palm lesion & let temple crusty. )    HPI:  Mr. Elliot Norris is a(n) 64 year old male who presents today as a return patient for a full body skin cancer screening. The patient has a history of nonmelanoma skin cancer. Today, the patient reports lesions on the left palm and left temple that have since resolved spontaneously. No other cutaneous concerns. The patient reports trying to be diligent with photoprotection.      Physical Exam:  Vitals:  There were no vitals taken for this visit.   SKIN: Total skin excluding the genitalia areas was performed. The exam included the scalp, face, neck, bilateral arms, chest, back, abdomen, bilateral legs, digits, mons pubis, buttocks, and nails.   - Interiano II.  - Pink macule(s) with gritty scale involving the vertex scalp, consistent with actinic keratosis.   - Involving the right vertex scalp is a 0.8 x 1.0 cm brown patch with central clearing.   - Multiple tan/brown macules and papules scattered throughout exam, consistent with benign nevi. No concerning features on dermoscopy.   - Scattered tan, homogenous macules scattered on sun exposed skin, consistent with solar lentigines.   - Scattered waxy, stuck on appearing papules and patches, consistent with seborrheic keratoses.  - Several 1-2 mm red dome shaped symmetric papules, consistent with cherry angiomas.     - No other lesions of concern on areas examined.     Medications:  Current Outpatient Medications   Medication Sig Dispense Refill     Acetaminophen (TYLENOL PO)        diclofenac (VOLTAREN) 1 % topical gel Apply 2 g topically 3 times daily as needed for moderate pain 100 g 0     metFORMIN (GLUCOPHAGE) 500 MG tablet        nitroGLYcerin (NITROSTAT) 0.4 MG sublingual tablet        Omega-3 Fish Oil 500 MG capsule Take 2 capsules (1,000 mg) by mouth 2 times daily       ticagrelor (BRILINTA) 90 MG tablet        vitamin D3 (CHOLECALCIFEROL) 50 mcg (2000 units) tablet Take 1 tablet (50 mcg) by mouth daily       atorvastatin (LIPITOR) 20 MG tablet TAKE 1 TABLET(20 MG) BY MOUTH DAILY 90 tablet 3     blood glucose monitoring (NO BRAND SPECIFIED) meter device kit Use to test blood sugar 2 times daily or as directed. 1 kit 2     calcipotriene (DOVONOX) 0.005 % external cream  (Patient not taking: Reported on 3/20/2025)       fluorouracil (EFUDEX) 5 % external cream Apply topically 2 times daily Mixed with equal parts calcipotriene cream to the scalp, forehead and  temples. Will cause redness. (Patient not taking: Reported on 3/20/2025) 40 g 1     losartan (COZAAR) 100 MG tablet Take 1 tablet (100 mg) by mouth daily 30 tablet 1     Melatonin 3 MG TBDP        No current facility-administered medications for this visit.      Past Medical History:   Patient Active Problem List   Diagnosis     ED (erectile dysfunction)     Hyperlipidemia LDL goal <160     Fatty liver     Insomnia     Obesity     Hypertension goal BP (blood pressure) < 140/90     Prediabetes     History of basal cell carcinoma     Scar     Depression, unspecified     Past Medical History:   Diagnosis Date     Arthritis 2016    A little bit.     Basal cell carcinoma      Cancer (H) 2009    Skin.     Depressive disorder     Occasionally, mild.     Diabetes (H)     Borderline     Hypertension 2016    About a year.       CC Referred Self, MD  No address on file on close of this encounter.      Again, thank you for allowing me to participate in the care of your patient.        Sincerely,        Allyson Sims PA-C    Electronically signed

## 2025-03-20 NOTE — PATIENT INSTRUCTIONS
Proper skin care from [facility]:    -Eliminate harsh soaps as they strip the natural oils from the skin, often resulting in dry itchy skin ( i.e. Dial, Zest, [place] Spring)  -Use mild soaps such as Cetaphil or Dove Sensitive Skin in the shower. You do not need to use soap on arms, legs, and trunk every time you shower unless visibly soiled.   -Avoid hot or cold showers.  -After showering, lightly dry off and apply moisturizing within 2-3 minutes. This will help trap moisture in the skin.   -Aggressive use of a moisturizer at least 1-2 times a day to the entire body (including -Vanicream, Cetaphil, Aquaphor or Cerave) and moisturize hands after every washing.  -We recommend using moisturizers that come in a tub that needs to be scooped out, not a pump. This has more of an oil base. It will hold moisture in your skin much better than a water base moisturizer. The above recommended are non-pore clogging.      Wear a sunscreen with at least SPF 30 on your face, ears, neck and V of the chest daily. Wear sunscreen on other areas of the body if those areas are exposed to the sun throughout the day. Sunscreens can contain physical and/or chemical blockers. Physical blockers are less likely to clog pores, these include zinc oxide and titanium dioxide. Reapply every two hour and after swimming.     Sunscreen examples: https://www.ewg.org/sunscreen/    UV radiation  UVA radiation remains constant throughout the day and throughout the year. It is a longer wavelength than UVB and therefore penetrates deeper into the skin leading to immediate and delayed tanning, photoaging, and skin cancer. 70-80% of UVA and UVB radiation occurs between the hours of 10am-2pm.  UVB radiation  UVB radiation causes the most harmful effects and is more significant during the summer months. However, snow and ice can reflect UVB radiation leading to skin damage during the winter months as well. UVB radiation is responsible for tanning,  burning, inflammation, delayed erythema (pinkness), pigmentation (brown spots), and skin cancer.     I recommend self monthly full body exams and yearly full body exams with a dermatology provider. If you develop a new or changing lesion please follow up for examination. Most skin cancers are pink and scaly or pink and pearly. However, we do see blue/brown/black skin cancers.  Consider the ABCDEs of melanoma when giving yourself your monthly full body exam ( don't forget the groin, buttocks, feet, toes, etc). A-asymmetry, B-borders, C-color, D-diameter, E-elevation or evolving. If you see any of these changes please follow up in clinic. If you cannot see your back I recommend purchasing a hand held mirror to use with a larger wall mirror.       Checking for Skin Cancer  You can find cancer early by checking your skin each month. There are 3 kinds of skin cancer. They are melanoma, basal cell carcinoma, and squamous cell carcinoma. Doing monthly skin checks is the best way to find new marks or skin changes. Follow the instructions below for checking your skin.   The ABCDEs of checking moles for melanoma   Check your moles or growths for signs of melanoma using ABCDE:   Asymmetry: the sides of the mole or growth don t match  Border: the edges are ragged, notched, or blurred  Color: the color within the mole or growth varies  Diameter: the mole or growth is larger than 6 mm (size of a pencil eraser)  Evolving: the size, shape, or color of the mole or growth is changing (evolving is not shown in the images below)    Checking for other types of skin cancer  Basal cell carcinoma or squamous cell carcinoma have symptoms such as:     A spot or mole that looks different from all other marks on your skin  Changes in how an area feels, such as itching, tenderness, or pain  Changes in the skin's surface, such as oozing, bleeding, or scaliness  A sore that does not heal  New swelling or redness beyond the border of a  mole    Who s at risk?  Anyone can get skin cancer. But you are at greater risk if you have:   Fair skin, light-colored hair, or light-colored eyes  Many moles or abnormal moles on your skin  A history of sunburns from sunlight or tanning beds  A family history of skin cancer  A history of exposure to radiation or chemicals  A weakened immune system  If you have had skin cancer in the past, you are at risk for recurring skin cancer.   How to check your skin  Do your monthly skin checkups in front of a full-length mirror. Check all parts of your body, including your:   Head (ears, face, neck, and scalp)  Torso (front, back, and sides)  Arms (tops, undersides, upper, and lower armpits)  Hands (palms, backs, and fingers, including under the nails)  Buttocks and genitals  Legs (front, back, and sides)  Feet (tops, soles, toes, including under the nails, and between toes)  If you have a lot of moles, take digital photos of them each month. Make sure to take photos both up close and from a distance. These can help you see if any moles change over time.   Most skin changes are not cancer. But if you see any changes in your skin, call your doctor right away. Only he or she can diagnose a problem. If you have skin cancer, seeing your doctor can be the first step toward getting the treatment that could save your life.   [employer] last reviewed this educational content on 4/1/2019 2000-2020 [employer]. [address] [place] [address], [place] [address]. All rights reserved. This information is not intended as a substitute for professional medical care. Always follow your healthcare professional's instructions.       When should I call my doctor?  If you are worsening or not improving, please, contact us or seek urgent care as noted below.     Who should I call with questions (adults)?    [facility] [phone]  For urgent needs outside of business hours call the [facility] at  [phone] and ask for the dermatology resident on call to be paged  If this is a medical emergency and you are unable to reach an ER, Call [phone]      If you need a prescription refill, please contact your pharmacy. Refills are approved or denied by our Physicians during normal business hours, Monday through Friday.  Per office policy, refills will not be granted if you have not been seen within the past year (or sooner depending on the condition).

## 2025-06-22 ENCOUNTER — HEALTH MAINTENANCE LETTER (OUTPATIENT)
Age: 65
End: 2025-06-22

## (undated) RX ORDER — FENTANYL CITRATE 50 UG/ML
INJECTION, SOLUTION INTRAMUSCULAR; INTRAVENOUS
Status: DISPENSED
Start: 2021-12-16